# Patient Record
Sex: FEMALE | Race: WHITE | NOT HISPANIC OR LATINO | Employment: FULL TIME | URBAN - METROPOLITAN AREA
[De-identification: names, ages, dates, MRNs, and addresses within clinical notes are randomized per-mention and may not be internally consistent; named-entity substitution may affect disease eponyms.]

---

## 2017-01-24 RX ORDER — CLORAZEPATE DIPOTASSIUM 7.5 MG/1
TABLET ORAL
Qty: 60 TABLET | Refills: 3 | Status: SHIPPED | OUTPATIENT
Start: 2017-01-24 | End: 2018-07-02

## 2017-01-31 RX ORDER — FLUTICASONE PROPIONATE 50 MCG
2 SPRAY, SUSPENSION (ML) NASAL DAILY PRN
Qty: 1 BOTTLE | Refills: 1 | Status: SHIPPED | OUTPATIENT
Start: 2017-01-31 | End: 2018-02-09 | Stop reason: SDUPTHER

## 2017-01-31 NOTE — TELEPHONE ENCOUNTER
----- Message from Jessica Larkin sent at 1/31/2017 10:59 AM CST -----  Contact: self  Patient is requesting a Rx for flonase    Please send to    [id number] - [place] - [id number]  [id number]  [place] [address]  Phone: [phone] Fax: [phone]

## 2017-02-10 ENCOUNTER — OFFICE VISIT (OUTPATIENT)
Dept: DERMATOLOGY | Facility: CLINIC | Age: 52
End: 2017-02-10
Payer: COMMERCIAL

## 2017-02-10 DIAGNOSIS — L73.8 SEBACEOUS HYPERPLASIA: ICD-10-CM

## 2017-02-10 DIAGNOSIS — L21.9 ACUTE SEBORRHEIC DERMATITIS: Primary | ICD-10-CM

## 2017-02-10 PROCEDURE — 99201 PR OFFICE/OUTPT VISIT,NEW,LEVL I: CPT | Mod: S$GLB,,, | Performed by: DERMATOLOGY

## 2017-02-10 PROCEDURE — 99999 PR PBB SHADOW E&M-EST. PATIENT-LVL II: CPT | Mod: PBBFAC,,, | Performed by: DERMATOLOGY

## 2017-02-10 RX ORDER — KETOCONAZOLE 20 MG/ML
SHAMPOO, SUSPENSION TOPICAL
Qty: 120 ML | Refills: 5 | Status: SHIPPED | OUTPATIENT
Start: 2017-02-10 | End: 2017-07-19

## 2017-02-10 RX ORDER — FLUOCINONIDE TOPICAL SOLUTION USP, 0.05% 0.5 MG/ML
SOLUTION TOPICAL
Qty: 60 ML | Refills: 3 | Status: SHIPPED | OUTPATIENT
Start: 2017-02-10 | End: 2017-07-19

## 2017-02-10 RX ORDER — ESTERIFIED ESTROGEN AND METHYLTESTOSTERONE .625; 1.25 MG/1; MG/1
TABLET ORAL
Status: ON HOLD | COMMUNITY
Start: 2017-01-25 | End: 2018-11-21 | Stop reason: HOSPADM

## 2017-02-10 RX ORDER — TRETINOIN 0.5 MG/G
CREAM TOPICAL
COMMUNITY
Start: 2016-11-08

## 2017-02-10 NOTE — PROGRESS NOTES
Subjective:       Patient ID:  Lakia Wright is a 52 y.o. female who presents for   Chief Complaint   Patient presents with    Acne     Currently using a retin-a    Hair Loss     x several years, no symptoms, no treatment      Acne  - Initial  Affected locations: face  Duration: several years.  Signs / symptoms: asymptomatic  Aggravated by: nothing  Treatments tried: Retin-A     Hair Loss  - Initial  Affected locations: scalp  Duration: Several years.  Signs / symptoms: asymptomatic  Aggravated by: nothing  Relieving factors/Treatments tried: nothing    Denies itching, flaking, or rashes in the scalp.  No fam hx of hair loss.  States hair thinning has been gradual over the yrs. Recent TSH, CBC WNL. Normal vit D levels in 2014 (and pt doesn't want to re-check this today).    Pt is interested in discussing cosmetic treatments for the face. Has done IPL in the past for her neck and chest and was pleased with results.  Uses retin-A 0.05%. Has had filler and botox in the past. Would like to know a good daily regimen. Interested in possibly doing more to help with sun damage and wrinkles.    Review of Systems   Constitutional: Negative for fever, chills, weight loss, weight gain, fatigue, night sweats and malaise.   Skin: Positive for daily sunscreen use and activity-related sunscreen use. Negative for recent sunburn.   Hematologic/Lymphatic: Does not bruise/bleed easily.        Objective:    Physical Exam   Constitutional: She appears well-developed and well-nourished. No distress.   Neurological: She is alert and oriented to person, place, and time. She is not disoriented.   Psychiatric: She has a normal mood and affect.   Skin:   Areas Examined (abnormalities noted in diagram):   Scalp / Hair Palpated and Inspected  Head / Face Inspection Performed  Neck Inspection Performed              Diagram Legend     Erythematous scaling macule/papule c/w actinic keratosis       Vascular papule c/w angioma      Pigmented  verrucoid papule/plaque c/w seborrheic keratosis      Yellow umbilicated papule c/w sebaceous hyperplasia      Irregularly shaped tan macule c/w lentigo     1-2 mm smooth white papules consistent with Milia      Movable subcutaneous cyst with punctum c/w epidermal inclusion cyst      Subcutaneous movable cyst c/w pilar cyst      Firm pink to brown papule c/w dermatofibroma      Pedunculated fleshy papule(s) c/w skin tag(s)      Evenly pigmented macule c/w junctional nevus     Mildly variegated pigmented, slightly irregular-bordered macule c/w mildly atypical nevus      Flesh colored to evenly pigmented papule c/w intradermal nevus       Pink pearly papule/plaque c/w basal cell carcinoma      Erythematous hyperkeratotic cursted plaque c/w SCC      Surgical scar with no sign of skin cancer recurrence      Open and closed comedones      Inflammatory papules and pustules      Verrucoid papule consistent consistent with wart     Erythematous eczematous patches and plaques     Dystrophic onycholytic nail with subungual debris c/w onychomycosis     Umbilicated papule    Erythematous-base heme-crusted tan verrucoid plaque consistent with inflamed seborrheic keratosis     Erythematous Silvery Scaling Plaque c/w Psoriasis     See annotation      Assessment / Plan:        Acute seborrheic dermatitis  -     fluocinonide (LIDEX) 0.05 % external solution; AAA scalp qd - bid prn scaling  Dispense: 60 mL; Refill: 3  -     ketoconazole (NIZORAL) 2 % shampoo; Wash hair with medicated shampoo at least 2x/week - let sit on scalp at least 5 minutes prior to rinsing  Dispense: 120 mL; Refill: 5  Rec: biotin 5mg daily    Sebaceous hyperplasia  This is a benign overgrowth of oil glands. Reassurance given. No treatment required.  Treatment of benign, asymptomatic lesions may be considered cosmetic.  Continue retin-A.      Discussed daily use of sunscreen, antioxidant cream, and nighttime retinoid.  Discussed indications for various laser  treatments and that she would have to schedule an appt with Dr. Nice for this if interested.  Discussed ViPeel as well as botox and fillers and the roles for each of these.    rtc prn

## 2017-07-10 ENCOUNTER — TELEPHONE (OUTPATIENT)
Dept: FAMILY MEDICINE | Facility: CLINIC | Age: 52
End: 2017-07-10

## 2017-07-10 NOTE — TELEPHONE ENCOUNTER
"Triaged patient from 7/17/17 RODO Valerio appointment for occassional chest pain. Per patient, she experiences "rib chest tightness sometimes during sleep sometimes during daytime for over a year". When asked denies SOB, dizziness, inability to catch breath, pain in described area. Has not talked with PCP/staff about this issue. Scheduled 7/13/17  for assessment with caution if develops any of above symptoms to present to ER. She agrees with this plan. Her LOV with PCP 7/2016. Scheduled Physical/wellness appointment with her care team 7/19/17.  "

## 2017-07-13 ENCOUNTER — OFFICE VISIT (OUTPATIENT)
Dept: FAMILY MEDICINE | Facility: CLINIC | Age: 52
End: 2017-07-13
Payer: COMMERCIAL

## 2017-07-13 ENCOUNTER — DOCUMENTATION ONLY (OUTPATIENT)
Dept: FAMILY MEDICINE | Facility: CLINIC | Age: 52
End: 2017-07-13

## 2017-07-13 ENCOUNTER — HOSPITAL ENCOUNTER (OUTPATIENT)
Dept: RADIOLOGY | Facility: CLINIC | Age: 52
Discharge: HOME | End: 2017-07-13
Attending: FAMILY MEDICINE
Payer: COMMERCIAL

## 2017-07-13 VITALS
TEMPERATURE: 98 F | BODY MASS INDEX: 23.6 KG/M2 | HEART RATE: 69 BPM | HEIGHT: 61 IN | WEIGHT: 125 LBS | SYSTOLIC BLOOD PRESSURE: 102 MMHG | DIASTOLIC BLOOD PRESSURE: 62 MMHG

## 2017-07-13 DIAGNOSIS — R07.89 CHEST PRESSURE: ICD-10-CM

## 2017-07-13 DIAGNOSIS — R07.89 CHEST PRESSURE: Primary | ICD-10-CM

## 2017-07-13 PROCEDURE — 99213 OFFICE O/P EST LOW 20 MIN: CPT | Mod: S$GLB,,, | Performed by: PHYSICIAN ASSISTANT

## 2017-07-13 PROCEDURE — 71020 XR CHEST PA AND LATERAL: CPT | Mod: 26,,, | Performed by: RADIOLOGY

## 2017-07-13 PROCEDURE — 99999 PR PBB SHADOW E&M-EST. PATIENT-LVL IV: CPT | Mod: PBBFAC,,, | Performed by: PHYSICIAN ASSISTANT

## 2017-07-13 PROCEDURE — 71020 XR CHEST PA AND LATERAL: CPT | Mod: TC,PO

## 2017-07-13 PROCEDURE — 93005 ELECTROCARDIOGRAM TRACING: CPT | Mod: S$GLB,,, | Performed by: FAMILY MEDICINE

## 2017-07-13 PROCEDURE — 93010 ELECTROCARDIOGRAM REPORT: CPT | Mod: S$GLB,,, | Performed by: INTERNAL MEDICINE

## 2017-07-13 RX ORDER — MELOXICAM 15 MG/1
7.5 TABLET ORAL DAILY
COMMUNITY
End: 2018-07-02

## 2017-07-13 NOTE — PROGRESS NOTES
Pre-Visit Chart Review  For Appointment Scheduled on (00119)    Health Maintenance Due   Topic Date Due    TETANUS VACCINE  02/01/1982    Pap Smear with HPV Cotest  02/01/1985    Colonoscopy  02/01/2014

## 2017-07-13 NOTE — PROGRESS NOTES
Subjective:       Patient ID: Lakia Wright is a 52 y.o. female.    Chief Complaint: Chest Pain (occ)    Patient present for evaluation of sensation of chest pressure.  She states that symptoms began about 1 year ago.  She states that when the symptoms first began she was under a lot of stress with her sick father.  She states that initially symptoms were only at night during sleep.  She states that symptoms occurred about 3 times in the past year.  Lately symptoms have occurred during the daytime; once while standing talking with a friend.  She exercising regularly and does not have chest pain, shortness of breath, nausea, vomiting or diaphoresis.  She states that the pressure starts mid sternal and radiates into the anterior neck.  Pressure has been anywhere from 1-10 minutes in duration.  When the pressure occurs she feels the need to take a deep breath.        Review of Systems   Constitutional: Negative for appetite change, fatigue and unexpected weight change.   Respiratory: Positive for chest tightness. Negative for apnea, cough, choking, shortness of breath, wheezing and stridor.    Cardiovascular: Negative for chest pain, palpitations and leg swelling.   Gastrointestinal: Negative for abdominal pain.        Denies heartburn      Musculoskeletal: Negative for back pain and myalgias.   Psychiatric/Behavioral: Positive for sleep disturbance. Negative for dysphoric mood. The patient is not nervous/anxious.        Objective:      Physical Exam   Constitutional: She appears well-developed and well-nourished. She is cooperative. No distress.   HENT:   Head: Normocephalic and atraumatic.   Eyes: Conjunctivae and EOM are normal. Pupils are equal, round, and reactive to light. No scleral icterus.   Cardiovascular: Normal rate, regular rhythm and normal heart sounds.    Pulmonary/Chest: Effort normal and breath sounds normal. She exhibits no tenderness.   Abdominal: Soft. Normal appearance and bowel sounds are  normal. She exhibits no distension. There is no tenderness.   Musculoskeletal:        Right lower leg: She exhibits no edema.        Left lower leg: She exhibits no edema.   Neurological: She is alert.   Skin: Skin is warm and dry.   Psychiatric: She has a normal mood and affect. Her speech is normal. Judgment normal. Cognition and memory are normal.   Vitals reviewed.      Assessment:       1. Chest pressure        Plan:       Lakia was seen today for chest pain.    Diagnoses and all orders for this visit:    Chest pressure  -     Exercise stress echo; Future  -     X-Ray Chest PA And Lateral; Future    Further recommendations will be made based on results     Discussed possibly anxiety as etiology however due to age, hormone therapy , family history will complete cardiac work up.   Seek ER for worsening symptoms CP, N/V, diaphoresis, shortness of breath

## 2017-07-17 ENCOUNTER — DOCUMENTATION ONLY (OUTPATIENT)
Dept: FAMILY MEDICINE | Facility: CLINIC | Age: 52
End: 2017-07-17

## 2017-07-17 NOTE — PROGRESS NOTES
Pre-Visit Chart Review  For Appointment Scheduled on 07/19/17    Health Maintenance Due   Topic Date Due    TETANUS VACCINE  02/01/1982    Pap Smear with HPV Cotest  02/01/1985    Colonoscopy  02/01/2014

## 2017-07-19 ENCOUNTER — CLINICAL SUPPORT (OUTPATIENT)
Dept: CARDIOLOGY | Facility: CLINIC | Age: 52
End: 2017-07-19
Payer: COMMERCIAL

## 2017-07-19 ENCOUNTER — TELEPHONE (OUTPATIENT)
Dept: FAMILY MEDICINE | Facility: CLINIC | Age: 52
End: 2017-07-19

## 2017-07-19 ENCOUNTER — OFFICE VISIT (OUTPATIENT)
Dept: FAMILY MEDICINE | Facility: CLINIC | Age: 52
End: 2017-07-19
Payer: COMMERCIAL

## 2017-07-19 VITALS
WEIGHT: 124.56 LBS | SYSTOLIC BLOOD PRESSURE: 100 MMHG | DIASTOLIC BLOOD PRESSURE: 50 MMHG | BODY MASS INDEX: 23.52 KG/M2 | HEIGHT: 61 IN | TEMPERATURE: 98 F | HEART RATE: 67 BPM

## 2017-07-19 DIAGNOSIS — R07.89 CHEST PRESSURE: ICD-10-CM

## 2017-07-19 DIAGNOSIS — Z13.220 SCREENING CHOLESTEROL LEVEL: ICD-10-CM

## 2017-07-19 DIAGNOSIS — Z00.00 ROUTINE MEDICAL EXAM: Primary | ICD-10-CM

## 2017-07-19 LAB
DIASTOLIC DYSFUNCTION: NO
RETIRED EF AND QEF - SEE NOTES: 58 (ref 55–65)

## 2017-07-19 PROCEDURE — 93351 STRESS TTE COMPLETE: CPT | Mod: S$GLB,,, | Performed by: INTERNAL MEDICINE

## 2017-07-19 PROCEDURE — 99999 PR PBB SHADOW E&M-EST. PATIENT-LVL III: CPT | Mod: PBBFAC,,, | Performed by: PHYSICIAN ASSISTANT

## 2017-07-19 PROCEDURE — 99396 PREV VISIT EST AGE 40-64: CPT | Mod: S$GLB,,, | Performed by: PHYSICIAN ASSISTANT

## 2017-07-19 PROCEDURE — 93321 DOPPLER ECHO F-UP/LMTD STD: CPT | Mod: S$GLB,,, | Performed by: INTERNAL MEDICINE

## 2017-07-19 RX ORDER — ESTRADIOL 0.1 MG/G
CREAM VAGINAL DAILY
Status: ON HOLD | COMMUNITY
End: 2018-11-21 | Stop reason: HOSPADM

## 2017-07-19 NOTE — PROGRESS NOTES
Subjective:       Patient ID: Lakia Wright is a 52 y.o. female.    Chief Complaint: Annual Exam    HPI   Patient is a 52 year old  female presenting to the clinic for annual wellness exam. She was recently seen by RODO Valerio last week for concerns of chest pressures. She feels as though in the past these may have been related anxiety, but she does not feel anxious currently. She does deny having any of these symptoms occur since her last visit with RODO Valerio.  Review of Systems   Constitutional: Negative for activity change, appetite change, chills, diaphoresis, fatigue and fever.   HENT: Negative for congestion, postnasal drip and rhinorrhea.    Respiratory: Negative.  Negative for cough, shortness of breath and wheezing.    Cardiovascular: Negative.  Negative for chest pain.        Chest pressure   Gastrointestinal: Negative for abdominal pain, blood in stool, constipation, diarrhea, nausea and vomiting.   Genitourinary: Negative for dysuria, frequency, hematuria and urgency.   Musculoskeletal: Negative.    Skin: Negative.  Negative for color change and rash.   Neurological: Negative for dizziness and syncope.   Psychiatric/Behavioral: Negative for agitation, behavioral problems and confusion.       Objective:      Physical Exam   Constitutional: Vital signs are normal. She appears well-developed and well-nourished. No distress.   HENT:   Head: Normocephalic and atraumatic.   Right Ear: Hearing, tympanic membrane, external ear and ear canal normal.   Left Ear: Hearing, tympanic membrane, external ear and ear canal normal.   Nose: Nose normal.   Mouth/Throat: Uvula is midline and oropharynx is clear and moist.   Cardiovascular: Normal rate, regular rhythm, S1 normal, S2 normal and normal heart sounds.  Exam reveals no gallop.    No murmur heard.  Pulses:       Radial pulses are 2+ on the right side, and 2+ on the left side.   <2sec cap refill fingers bilat     Pulmonary/Chest:  Effort normal and breath sounds normal. No respiratory distress. She has no decreased breath sounds. She has no wheezes. She has no rhonchi.   Abdominal: Soft. Normal appearance and bowel sounds are normal. There is no tenderness. There is no rigidity and no guarding.   Skin: Skin is warm and dry. She is not diaphoretic.   Appropriate skin turgor   Psychiatric: She has a normal mood and affect. Her speech is normal and behavior is normal. Judgment and thought content normal. Cognition and memory are normal.       Assessment:       1. Routine medical exam    2. Chest pressure    3. Screening cholesterol level        Plan:   Lakia was seen today for annual exam.    Diagnoses and all orders for this visit:    Routine medical exam  -     Vitamin D; Future    Chest pressure  -     CBC auto differential; Future  -     Comprehensive metabolic panel; Future  -     TSH; Future    Screening cholesterol level  -     Lipid panel; Future

## 2017-07-19 NOTE — TELEPHONE ENCOUNTER
Spoke to patient notified she can come in early but will have to wait if other patient's are here, states understanding

## 2017-07-19 NOTE — TELEPHONE ENCOUNTER
----- Message from Denny Jeanne sent at 7/19/2017  1:01 PM CDT -----  Contact: Patient  Patient is now in [place] doing a test and it is scheduled for 2:00pm and has an appointment today for her Well Check at 5:40pm.  She would like to come in earlier after her test is done.  Can you fit her in at an earlier time.  Please call her back [phone].  Thank you

## 2017-07-26 ENCOUNTER — TELEPHONE (OUTPATIENT)
Dept: FAMILY MEDICINE | Facility: CLINIC | Age: 52
End: 2017-07-26

## 2017-07-26 NOTE — TELEPHONE ENCOUNTER
Please inform patient that the alternative to the stool cards is a test called cologuard. I have the form filled out for this to be send to her house for testing, but she will need to come to clinic and sign form.     It looks like under like [employer] will cover this, but she may want to call insurance compant to be sure.

## 2017-07-26 NOTE — TELEPHONE ENCOUNTER
Returned Patient call. Patient States at this time she declines the alternative to the stool cards.       ----- Message from Mabel Salguero sent at 7/26/2017 11:25 AM CDT -----  Patient states she missed a call from office please call [phone] (home)

## 2017-08-24 DIAGNOSIS — Z12.11 COLON CANCER SCREENING: ICD-10-CM

## 2018-02-09 RX ORDER — FLUTICASONE PROPIONATE 50 MCG
SPRAY, SUSPENSION (ML) NASAL
Qty: 16 G | Refills: 11 | Status: SHIPPED | OUTPATIENT
Start: 2018-02-09 | End: 2019-05-26 | Stop reason: SDUPTHER

## 2018-07-02 ENCOUNTER — DOCUMENTATION ONLY (OUTPATIENT)
Dept: FAMILY MEDICINE | Facility: CLINIC | Age: 53
End: 2018-07-02

## 2018-07-02 ENCOUNTER — OFFICE VISIT (OUTPATIENT)
Dept: FAMILY MEDICINE | Facility: CLINIC | Age: 53
End: 2018-07-02
Payer: COMMERCIAL

## 2018-07-02 VITALS
SYSTOLIC BLOOD PRESSURE: 94 MMHG | HEART RATE: 62 BPM | DIASTOLIC BLOOD PRESSURE: 59 MMHG | HEIGHT: 61 IN | WEIGHT: 117.94 LBS | TEMPERATURE: 98 F | BODY MASS INDEX: 22.27 KG/M2

## 2018-07-02 DIAGNOSIS — Z12.39 SCREENING FOR BREAST CANCER: ICD-10-CM

## 2018-07-02 DIAGNOSIS — Z13.220 SCREENING CHOLESTEROL LEVEL: ICD-10-CM

## 2018-07-02 DIAGNOSIS — Z00.00 ANNUAL PHYSICAL EXAM: Primary | ICD-10-CM

## 2018-07-02 DIAGNOSIS — Z12.11 SCREENING FOR COLON CANCER: ICD-10-CM

## 2018-07-02 PROCEDURE — 99396 PREV VISIT EST AGE 40-64: CPT | Mod: S$GLB,,, | Performed by: PHYSICIAN ASSISTANT

## 2018-07-02 PROCEDURE — 99999 PR PBB SHADOW E&M-EST. PATIENT-LVL IV: CPT | Mod: PBBFAC,,, | Performed by: PHYSICIAN ASSISTANT

## 2018-07-02 RX ORDER — MEDROXYPROGESTERONE ACETATE 2.5 MG/1
2.5 TABLET ORAL DAILY
Status: ON HOLD | COMMUNITY
End: 2018-11-21

## 2018-07-02 NOTE — PROGRESS NOTES
Subjective:       Patient ID: Lakia Wright is a 53 y.o. female.    Chief Complaint: Annual Exam    HPI   Patient is a 53 tear old  female presenting to the clinic for annual physical. She is doing well at this time. She never got her labs done last year for her annual. She will be due for her mammogram 7/5/18. She has never had a colon cancer screening. She was supposed to come sign the form to get cologaurd last year and never did. She opts to sign the form while in clinic, but still would like to think about getting a colonoscopy done in [place], [place].  Review of Systems   Constitutional: Negative for activity change, appetite change, chills, diaphoresis, fatigue and fever.   HENT: Negative for congestion, postnasal drip and rhinorrhea.    Respiratory: Negative.  Negative for cough, shortness of breath and wheezing.    Cardiovascular: Negative.  Negative for chest pain.   Gastrointestinal: Negative for abdominal pain, blood in stool, constipation, diarrhea, nausea and vomiting.   Genitourinary: Negative for dysuria, frequency, hematuria and urgency.   Musculoskeletal: Negative.    Skin: Negative.  Negative for color change and rash.   Neurological: Negative for dizziness and syncope.   Psychiatric/Behavioral: Negative for agitation, behavioral problems and confusion.       Objective:      Physical Exam   Constitutional: Vital signs are normal. She appears well-developed and well-nourished. No distress.   HENT:   Head: Normocephalic and atraumatic.   Right Ear: Hearing, tympanic membrane, external ear and ear canal normal.   Left Ear: Hearing, tympanic membrane, external ear and ear canal normal.   Nose: Nose normal.   Mouth/Throat: Uvula is midline, oropharynx is clear and moist and mucous membranes are normal.   Cardiovascular: Normal rate, regular rhythm, S1 normal, S2 normal and normal heart sounds.  Exam reveals no gallop.    No murmur heard.  Pulses:       Radial pulses are 2+ on the right side,  and 2+ on the left side.        Pulmonary/Chest: Effort normal and breath sounds normal. No respiratory distress. She has no wheezes. She has no rhonchi.   Abdominal: Soft. Normal appearance and bowel sounds are normal. There is no tenderness. There is no rigidity, no guarding, no tenderness at McBurney's point and negative Snyder's sign.   Skin: Skin is warm and dry. She is not diaphoretic.   Appropriate skin turgor   Psychiatric: She has a normal mood and affect. Her speech is normal and behavior is normal. Judgment and thought content normal. Cognition and memory are normal.       Assessment:       1. Annual physical exam    2. Screening cholesterol level    3. Screening for breast cancer    4. Screening for colon cancer        Plan:       Lakia was seen today for annual exam.    Diagnoses and all orders for this visit:    Annual physical exam  -     CBC auto differential; Future  -     Comprehensive metabolic panel; Future  -     TSH; Future  -     Vitamin D; Future    Screening cholesterol level  -     Lipid panel; Future    Screening for breast cancer  -     Mammo Digital Screening Bilateral With CAD; Future    Screening for colon cancer  We discussed extensively about pros/cons of cologaurd vs. Colonoscopy. She opts to sign the cologuard paperwork in case she decides to move forward with this.   -     Ambulatory referral to Gastroenterology

## 2018-07-02 NOTE — PROGRESS NOTES
Pre-Visit Chart Review  For Appointment Scheduled on 07/02/18    Health Maintenance Due   Topic Date Due    Colonoscopy  02/01/2014

## 2018-08-08 ENCOUNTER — PATIENT OUTREACH (OUTPATIENT)
Dept: ADMINISTRATIVE | Facility: HOSPITAL | Age: 53
End: 2018-08-08

## 2018-08-14 ENCOUNTER — TELEPHONE (OUTPATIENT)
Dept: FAMILY MEDICINE | Facility: CLINIC | Age: 53
End: 2018-08-14

## 2018-08-14 NOTE — TELEPHONE ENCOUNTER
Spoke with patient and she wanted to know if we had alternative home tests for a colonoscopy. Stated we had the Fitkit, but she stated that Annie had mentioned about a Cologuard. Patient decided she wanted to be scheduled for a colonoscopy. Patient stated she would call [facility] to schedule an appointment.

## 2018-08-14 NOTE — TELEPHONE ENCOUNTER
----- Message from Anika Fishman sent at 8/14/2018 10:37 AM CDT -----  Contact: self  Needs to discuss the other test that's available other than a colonoscopy. States that it is covered by insurance. Please call back at [phone] (home)

## 2018-11-02 ENCOUNTER — OFFICE VISIT (OUTPATIENT)
Dept: GASTROENTEROLOGY | Facility: CLINIC | Age: 53
End: 2018-11-02
Payer: COMMERCIAL

## 2018-11-02 VITALS
DIASTOLIC BLOOD PRESSURE: 54 MMHG | SYSTOLIC BLOOD PRESSURE: 98 MMHG | HEIGHT: 61 IN | WEIGHT: 122.81 LBS | BODY MASS INDEX: 23.19 KG/M2

## 2018-11-02 DIAGNOSIS — Z12.11 COLON CANCER SCREENING: Primary | ICD-10-CM

## 2018-11-02 DIAGNOSIS — Z83.719 FAMILY HISTORY OF COLONIC POLYPS: ICD-10-CM

## 2018-11-02 PROCEDURE — 99999 PR PBB SHADOW E&M-EST. PATIENT-LVL III: CPT | Mod: PBBFAC,,, | Performed by: NURSE PRACTITIONER

## 2018-11-02 PROCEDURE — 99499 UNLISTED E&M SERVICE: CPT | Mod: S$GLB,,, | Performed by: NURSE PRACTITIONER

## 2018-11-02 NOTE — LETTER
November 2, 2018      [facility]  [address] [facility] [place] [address]           [facility]  [address] [place] [address]  [place] [address]  Phone: [phone]          Patient: Lakia Wright   MR Number: [id number]   YOB: 1965   Date of Visit: 11/2/2018       Dear Nancy Fregoso:    Thank you for referring Lakia Wright to me for evaluation. Attached you will find relevant portions of my assessment and plan of care.    If you have questions, please do not hesitate to call me. I look forward to following Lakia Wright along with you.    Sincerely,    Maryann Muhammad, [facility]    Enclosure  CC:  No Recipients    If you would like to receive this communication electronically, please contact [email or web address] or [phone] to request more information on [employer] access.    For providers and/or their staff who would like to refer a patient to [facility], please contact us through our one-stop-shop provider referral line, [facility], at [phone].    If you feel you have received this communication in error or would no longer like to receive these types of communications, please e-mail [email or web address]

## 2018-11-02 NOTE — H&P (VIEW-ONLY)
Subjective:       Patient ID: Lakia Wright is a 53 y.o. female.    Chief Complaint: Colon Cancer Screening    HPI  Presents today to discuss colon cancer screening options.  Reports family history of colon polyps in her mother.  Denies change in bowel habits, blood with bowel movements or abdominal pain.  She has never had colonoscopy.      Review of Systems   Constitutional: Negative for activity change, appetite change, fatigue, fever and unexpected weight change.   Respiratory: Negative for shortness of breath.    Cardiovascular: Negative for chest pain.   Gastrointestinal: Negative for abdominal pain, blood in stool, constipation, diarrhea, nausea and vomiting.   Skin: Negative.    Psychiatric/Behavioral: Negative.        Objective:      Physical Exam   Constitutional: She is oriented to person, place, and time. She appears well-developed and well-nourished. No distress.   Eyes: No scleral icterus.   Cardiovascular: Normal rate.   Pulmonary/Chest: Effort normal. No respiratory distress.   Abdominal: Soft.   Neurological: She is alert and oriented to person, place, and time.   Skin: Skin is warm and dry. She is not diaphoretic.   Psychiatric: She has a normal mood and affect. Her behavior is normal. Judgment and thought content normal.   Vitals reviewed.      Assessment:       1. Colon cancer screening        Plan:         Lakia was seen today for colon cancer screening.    Diagnoses and all orders for this visit:    Colon cancer screening         I have explained the planned procedures to the patient.The risks, benefits and alternatives of the procedure were also explained in detail. Patient verbalized understanding, all questions were answered. The patient agrees to proceed as planned    Discussed FIT testing, cologuard, colonoscopy.  She has family member with colon polyps and not considered average risk for screening.  Therefore, colonoscopy recommended though risks and benefits of each have been  discussed.

## 2018-11-02 NOTE — PATIENT INSTRUCTIONS
CLENPIQ Instructions    You are scheduled for a colonoscopy with Dr. Laguna on 11/28/18 at [facility] located at [address] [place] [address].  Check in at the admit desk, first floor of the hospital (which is the building on the left).     You will receive a call 2-3 days before your colonoscopy to tell you the time to arrive.  If you have not received a call by the day before your procedure, call the Endoscopy Lab at [phone].    To ensure that your test is accurate and complete, you MUST follow these instructions listed below.  If you have any questions, please call our office at [phone].  Plan on being at the hospital for your procedure for 3-4 hours.    1.  Follow a CLEAR LIQUID DIET for the entire day before your scheduled colonoscopy.  This means no solid food the entire day starting when you wake.  You may have as much of the clear liquids as you want throughout the day.   CLEAR LIQUID DIET:   - Avoid Red, Orange, Purple, and/or Blue food coloring   - NO DAIRY   - You can have:  Coffee with sugar (no creamer), tea, water, soda, apple or white grape juice, chicken or beef broth/bouillon (no meat, noodles, or veggies), green/yellow popsicles, green/yellow Jell-O, lemonade.    2.  AT 5 pm the evening before your colonoscopy, OPEN ONE (1) BOTTLE OF CLENPIQ AND DRINK THE ENTIRE BOTTLE.  DRINK FIVE (5) 8-OUNCE GLASSES OF WATER (40 OUNCES TOTAL) OVER THE NEXT FIVE (5) HOURS.     3.  The endoscopy department will call you 2 days before your colonoscopy to tell you the exact time to arrive, AND to tell you the exact time to drink the 2nd portion of your prep (which will be FIVE HOURS BEFORE YOUR ARRIVAL TIME).  At this time given to you, OPEN THE OTHER ONE (1) BOTTLE OF CLENPIQ AND DRINK THE ENTIRE BOTTLE.  DRINK THREE (3) 8-OUNCE GLASSES OF WATER (24 OUNCES TOTAL) OVER THE NEXT THREE (3) HOURS. Once this is complete, you can not have anything else by mouth!    4.  You must have someone with you to  DRIVE YOU HOME since you will be receiving IV sedation for the colonoscopy.    5.  It is ok to take your heart, blood pressure, and seizure medications in the morning of your test with a SIP of water.  Hold other medications until after your procedure.  Do NOT have anything else to eat or drink the morning of your colonoscopy.  It is ok to brush your teeth.    6.  If you are on blood thinners THAT YOU HAVE BEEN INSTRUCTED TO HOLD BY YOUR DOCTOR FOR THIS PROCEDURE, then do NOT take this the morning of your colonoscopy.  Do NOT stop these medications on your own, they must be approved to be held by your doctor.  Your colonoscopy can NOT be done if you are on these medications.  Examples of blood thinners include: Coumadin, Aggrenox, Plavix, Pradaxa, Reapro, Pletal, Xarelto, Ticagrelor, Brilinta, Eliquis, and high dose aspirin (325 mg).  You do not have to stop baby aspirin 81 mg.    7.  IF YOU ARE DIABETIC:  NO INSULIN OR ORAL MEDICATIONS THE MORNING OF THE COLONOSCOPY.  TAKE ONLY HALF THE DOSE OF YOUR INSULIN THE DAY BEFORE THE COLONOSCOPY.  DO NOT TAKE ANY ORAL DIABETIC MEDICATIONS THE DAY BEFORE THE COLONOSCOPY.  IF YOU ARE AN INSULIN DEPENDENT DIABETIC WITH UNSTABLE BLOOD SUGARDS, NOTIFY YOUR PRIMARY CARE PHYSICIAN FOR INSTRUCTIONS.

## 2018-11-05 ENCOUNTER — TELEPHONE (OUTPATIENT)
Dept: GASTROENTEROLOGY | Facility: CLINIC | Age: 53
End: 2018-11-05

## 2018-11-07 ENCOUNTER — TELEPHONE (OUTPATIENT)
Dept: GASTROENTEROLOGY | Facility: CLINIC | Age: 53
End: 2018-11-07

## 2018-11-07 NOTE — TELEPHONE ENCOUNTER
----- Message from Austin Dunbar MA sent at 11/7/2018 10:05 AM CST -----  Contact: [phone]/ self       ----- Message -----  From: Evette Benito  Sent: 11/7/2018   7:47 AM  To: [facility] Staff    Pt would like to reschedule her procedure date . Please advise

## 2018-11-07 NOTE — TELEPHONE ENCOUNTER
Spoke with pt and she would like to reschedule the Colonoscopy due to her being off the week of 11/19. Patient has been scheduled with Dr. Laguna on 11/21/18. Informed pt that there was a change in her prep due to her insurance not covering it. Patient is now using Prepopik. Verbal Understanding.

## 2018-11-19 ENCOUNTER — TELEPHONE (OUTPATIENT)
Dept: ENDOSCOPY | Facility: HOSPITAL | Age: 53
End: 2018-11-19

## 2018-11-19 NOTE — TELEPHONE ENCOUNTER
Spoke with patient about arrival time @.915.     Prep instructions reviewed: the day before the procedure, follow a clear liquid diet all day, then start the first 1/2 of prep at 5pm and take 2nd 1/2 of prep @ 0315   .  Pt must be completely NPO when prep completed @   0315       .    Medications: Do not take Insulin or oral diabetic medications the day of the procedure.  Take as prescribed: heart, seizure and blood pressure medication in the morning with a sip of water (less than an ounce).  Take any breathing medications and bring inhalers to hospital with you Leave all valuables and jewelry at home.     Wear comfortable clothes to procedure to change into hospital gown You cannot drive for 24 hours after your procedure because you will receive sedation for your procedure to make you comfortable.  A ride must be provided at discharge.

## 2018-11-21 ENCOUNTER — ANESTHESIA (OUTPATIENT)
Dept: ENDOSCOPY | Facility: HOSPITAL | Age: 53
End: 2018-11-21
Payer: COMMERCIAL

## 2018-11-21 ENCOUNTER — ANESTHESIA EVENT (OUTPATIENT)
Dept: ENDOSCOPY | Facility: HOSPITAL | Age: 53
End: 2018-11-21
Payer: COMMERCIAL

## 2018-11-21 ENCOUNTER — HOSPITAL ENCOUNTER (OUTPATIENT)
Facility: HOSPITAL | Age: 53
Discharge: HOME | End: 2018-11-21
Attending: INTERNAL MEDICINE | Admitting: INTERNAL MEDICINE
Payer: COMMERCIAL

## 2018-11-21 VITALS
BODY MASS INDEX: 22.28 KG/M2 | WEIGHT: 118 LBS | TEMPERATURE: 98 F | OXYGEN SATURATION: 99 % | DIASTOLIC BLOOD PRESSURE: 65 MMHG | SYSTOLIC BLOOD PRESSURE: 107 MMHG | HEART RATE: 71 BPM | RESPIRATION RATE: 16 BRPM | HEIGHT: 61 IN

## 2018-11-21 DIAGNOSIS — Z12.11 SCREEN FOR COLON CANCER: Primary | ICD-10-CM

## 2018-11-21 PROCEDURE — 37000008 HC ANESTHESIA 1ST 15 MINUTES: Performed by: INTERNAL MEDICINE

## 2018-11-21 PROCEDURE — 45380 COLONOSCOPY AND BIOPSY: CPT | Mod: 33,,, | Performed by: INTERNAL MEDICINE

## 2018-11-21 PROCEDURE — 25000003 PHARM REV CODE 250: Performed by: INTERNAL MEDICINE

## 2018-11-21 PROCEDURE — 45380 COLONOSCOPY AND BIOPSY: CPT | Performed by: INTERNAL MEDICINE

## 2018-11-21 PROCEDURE — 63600175 PHARM REV CODE 636 W HCPCS: Performed by: NURSE ANESTHETIST, CERTIFIED REGISTERED

## 2018-11-21 PROCEDURE — 27201012 HC FORCEPS, HOT/COLD, DISP: Performed by: INTERNAL MEDICINE

## 2018-11-21 PROCEDURE — 88305 TISSUE EXAM BY PATHOLOGIST: CPT | Mod: 26,,, | Performed by: PATHOLOGY

## 2018-11-21 PROCEDURE — 37000009 HC ANESTHESIA EA ADD 15 MINS: Performed by: INTERNAL MEDICINE

## 2018-11-21 PROCEDURE — 88305 TISSUE EXAM BY PATHOLOGIST: CPT | Performed by: PATHOLOGY

## 2018-11-21 RX ORDER — SODIUM CHLORIDE 9 MG/ML
INJECTION, SOLUTION INTRAVENOUS CONTINUOUS
Status: DISCONTINUED | OUTPATIENT
Start: 2018-11-21 | End: 2018-11-21 | Stop reason: HOSPADM

## 2018-11-21 RX ORDER — PROPOFOL 10 MG/ML
VIAL (ML) INTRAVENOUS CONTINUOUS PRN
Status: DISCONTINUED | OUTPATIENT
Start: 2018-11-21 | End: 2018-11-21

## 2018-11-21 RX ORDER — SODIUM CHLORIDE 0.9 % (FLUSH) 0.9 %
3 SYRINGE (ML) INJECTION
Status: DISCONTINUED | OUTPATIENT
Start: 2018-11-21 | End: 2018-11-21 | Stop reason: HOSPADM

## 2018-11-21 RX ORDER — LIDOCAINE HCL/PF 100 MG/5ML
SYRINGE (ML) INTRAVENOUS
Status: DISCONTINUED | OUTPATIENT
Start: 2018-11-21 | End: 2018-11-21

## 2018-11-21 RX ORDER — PROPOFOL 10 MG/ML
VIAL (ML) INTRAVENOUS
Status: DISCONTINUED | OUTPATIENT
Start: 2018-11-21 | End: 2018-11-21

## 2018-11-21 RX ADMIN — PROPOFOL 80 MG: 10 INJECTION, EMULSION INTRAVENOUS at 10:11

## 2018-11-21 RX ADMIN — PROPOFOL 150 MCG/KG/MIN: 10 INJECTION, EMULSION INTRAVENOUS at 10:11

## 2018-11-21 RX ADMIN — LIDOCAINE HYDROCHLORIDE 100 MG: 20 INJECTION, SOLUTION INTRAVENOUS at 10:11

## 2018-11-21 RX ADMIN — SODIUM CHLORIDE: 0.9 INJECTION, SOLUTION INTRAVENOUS at 09:11

## 2018-11-21 NOTE — INTERVAL H&P NOTE
The patient has been examined and the H&P has been reviewed:    I concur with the findings and no changes have occurred since H&P was written.    Anesthesia/Surgery risks, benefits and alternative options discussed and understood by patient/family.          Active Hospital Problems    Diagnosis  POA    Screen for colon cancer [Z12.11]  Not Applicable      Resolved Hospital Problems   No resolved problems to display.

## 2018-11-21 NOTE — TRANSFER OF CARE
"Anesthesia Transfer of Care Note    Patient: Lakia Wright    Procedure(s) Performed: Procedure(s) (LRB):  COLONOSCOPY Clenpiq (N/A)    Patient location: GI    Anesthesia Type: MAC    Transport from OR: Transported from OR on room air with adequate spontaneous ventilation    Post pain: adequate analgesia    Post assessment: no apparent anesthetic complications and tolerated procedure well    Post vital signs: stable    Level of consciousness: awake, alert and oriented    Nausea/Vomiting: no nausea/vomiting    Complications: none    Transfer of care protocol was followed      Last vitals:   Visit Vitals  BP (!) 113/59   Pulse 62   Temp 36.6 °C (97.9 °F)   Resp 16   Ht 5' 1" (1.549 m)   Wt 53.5 kg (118 lb)   SpO2 99%   Breastfeeding? No   BMI 22.30 kg/m²     "

## 2018-11-21 NOTE — PROVATION PATIENT INSTRUCTIONS
Discharge Summary/Instructions after an Endoscopic Procedure  Patient Name: Lakia Wright  Patient MRN: [id number]  Patient YOB: 1965 Wednesday, November 21, 2018  Maria Alejandra Laguna MD  RESTRICTIONS:  During your procedure today, you received medications for sedation.  These   medications may affect your judgment, balance and coordination.  Therefore,   for 24 hours, you have the following restrictions:   - DO NOT drive a car, operate machinery, make legal/financial decisions,   sign important papers or drink alcohol.    ACTIVITY:  Today: no heavy lifting, straining or running due to procedural   sedation/anesthesia.  The following day: return to full activity including work.  DIET:  Eat and drink normally unless instructed otherwise.     TREATMENT FOR COMMON SIDE EFFECTS:  - Mild abdominal pain, nausea, belching, bloating or excessive gas:  rest,   eat lightly and use a heating pad.  - Sore Throat: treat with throat lozenges and/or gargle with warm salt   water.  - Because air was used during the procedure, expelling large amounts of air   from your rectum or belching is normal.  - If a bowel prep was taken, you may not have a bowel movement for 1-3 days.    This is normal.  SYMPTOMS TO WATCH FOR AND REPORT TO YOUR PHYSICIAN:  1. Abdominal pain or bloating, other than gas cramps.  2. Chest pain.  3. Back pain.  4. Signs of infection such as: chills or fever occurring within 24 hours   after the procedure.  5. Rectal bleeding, which would show as bright red, maroon, or black stools.   (A tablespoon of blood from the rectum is not serious, especially if   hemorrhoids are present.)  6. Vomiting.  7. Weakness or dizziness.  GO DIRECTLY TO THE NEAREST EMERGENCY ROOM IF YOU HAVE ANY OF THE FOLLOWING:      Difficulty breathing              Chills and/or fever over 101 F   Persistent vomiting and/or vomiting blood   Severe abdominal pain   Severe chest pain   Black, tarry stools   Bleeding- more than one  tablespoon   Any other symptom or condition that you feel may need urgent attention  Your doctor recommends these additional instructions:  If any biopsies were taken, your doctors clinic will contact you in 1 to 2   weeks with any results.  - Discharge patient to home.   - Patient has a contact number available for emergencies.  The signs and   symptoms of potential delayed complications were discussed with the   patient.  Return to normal activities tomorrow.  Written discharge   instructions were provided to the patient.   - Resume previous diet.   - Continue present medications.   - Await pathology results.   - Repeat colonoscopy for surveillance based on pathology results.   - Return to GI clinic PRN.  For questions, problems or results please call your physician - Maria Alejandra Laguna MD at Work:  [phone].  EMERGENCY PHONE NUMBER: [phone],  LAB RESULTS: [phone]  IF A COMPLICATION OR EMERGENCY SITUATION ARISES AND YOU ARE UNABLE TO REACH   YOUR PHYSICIAN - GO DIRECTLY TO THE EMERGENCY ROOM.  MD Maria Alejandra Patel MD  11/21/2018 10:51:02 AM  This report has been verified and signed electronically.  [employer]ATION

## 2018-11-21 NOTE — ANESTHESIA POSTPROCEDURE EVALUATION
"Anesthesia Post Evaluation    Patient: Lakia Wright    Procedure(s) Performed: Procedure(s) (LRB):  COLONOSCOPY Clenpiq (N/A)    Final Anesthesia Type: MAC  Patient location during evaluation: GI PACU  Patient participation: Yes- Able to Participate  Level of consciousness: oriented and awake and alert  Post-procedure vital signs: reviewed and stable  Pain management: adequate  Airway patency: patent  PONV status at discharge: No PONV  Anesthetic complications: no      Cardiovascular status: blood pressure returned to baseline, hemodynamically stable and stable  Respiratory status: spontaneous ventilation, room air and unassisted  Hydration status: euvolemic  Follow-up not needed.        Visit Vitals  BP (!) 113/59   Pulse 62   Temp 36.6 °C (97.9 °F)   Resp 16   Ht 5' 1" (1.549 m)   Wt 53.5 kg (118 lb)   SpO2 99%   Breastfeeding? No   BMI 22.30 kg/m²       Pain/[place] Score: Pain Assessment Performed: Yes (11/21/2018  9:42 AM)  Presence of Pain: denies (11/21/2018  9:42 AM)        "

## 2018-11-21 NOTE — ANESTHESIA PREPROCEDURE EVALUATION
2018  Lakia Wright is a 53 y.o., female for colonoscopy    Review of patient's allergies indicates:   Allergen Reactions    Pcn [penicillins] Rash     Past Medical History:   Diagnosis Date    Depression      Past Surgical History:   Procedure Laterality Date     SECTION, LOW TRANSVERSE       Patient Active Problem List   Diagnosis    Depression     Wt Readings from Last 3 Encounters:   18 55.7 kg (122 lb 12.7 oz)   18 53.5 kg (117 lb 15.1 oz)   17 56.5 kg (124 lb 9 oz)     Temp Readings from Last 3 Encounters:   18 36.5 °C (97.7 °F) (Oral)   17 36.7 °C (98 °F) (Oral)   17 36.9 °C (98.4 °F) (Oral)     BP Readings from Last 3 Encounters:   18 (!) 98/54   18 (!) 94/59   17 (!) 100/50     Pulse Readings from Last 3 Encounters:   18 62   17 67   17 69         Anesthesia Evaluation    I have reviewed the Patient Summary Reports.        Review of Systems      Physical Exam  General:  Well nourished    Airway/Jaw/Neck:  Airway Findings: Mouth Opening: Normal Tongue: Normal  General Airway Assessment: Adult  Mallampati: II  Improves to II with phonation.  TM Distance: 4 - 6 cm       Chest/Lungs:  Chest/Lungs Findings: Clear to auscultation, Normal Respiratory Rate     Heart/Vascular:  Heart Findings: Rate: Normal  Rhythm: Regular Rhythm  Sounds: Normal        Mental Status:  Mental Status Findings:  Cooperative, Alert and Oriented       Lab Results   Component Value Date    WBC 6.79 11/10/2016    HGB 14.9 11/10/2016    HCT 45.4 11/10/2016    MCV 97 11/10/2016     11/10/2016       Chemistry        Component Value Date/Time     11/10/2016 0709    K 4.4 11/10/2016 0709     11/10/2016 0709    CO2 27 11/10/2016 0709    BUN 15 11/10/2016 0709    CREATININE 0.7 11/10/2016 0709    GLU 82 11/10/2016 0709         Component Value Date/Time    CALCIUM 9.6 11/10/2016 0709    ALKPHOS 56 11/10/2016 0709    AST 19 11/10/2016 0709    ALT 14 11/10/2016 0709    BILITOT 0.6 11/10/2016 0709    ESTGFRAFRICA >60.0 11/10/2016 0709    EGFRNONAA >60.0 11/10/2016 0709            Anesthesia Plan  Type of Anesthesia, risks & benefits discussed:  Anesthesia Type:  MAC  Patient's Preference:   Intra-op Monitoring Plan:   Intra-op Monitoring Plan Comments:   Post Op Pain Control Plan:   Post Op Pain Control Plan Comments:   Induction:   IV  Beta Blocker:         Informed Consent: Patient understands risks and agrees with Anesthesia plan.  Questions answered. Anesthesia consent signed with patient.  ASA Score: 2     Day of Surgery Review of History & Physical: I have interviewed and examined the patient. I have reviewed the patient's H&P dated:  There are no significant changes.  H&P update referred to the provider.  H&P completed by Anesthesiologist.       Ready For Surgery From Anesthesia Perspective.

## 2018-11-26 ENCOUNTER — TELEPHONE (OUTPATIENT)
Dept: FAMILY MEDICINE | Facility: CLINIC | Age: 53
End: 2018-11-26

## 2018-11-26 NOTE — TELEPHONE ENCOUNTER
----- Message from Carmine Plaza sent at 11/26/2018  2:17 PM CST -----  Type: Needs Medical Advice    Who Called:  Patient    Best Call Back Number: [phone]  Additional Information: Patient wants to know why a fasting lab was scheduled for her on 12/03.  Please call to advise

## 2018-12-05 ENCOUNTER — TELEPHONE (OUTPATIENT)
Dept: GASTROENTEROLOGY | Facility: CLINIC | Age: 53
End: 2018-12-05

## 2019-05-27 RX ORDER — FLUTICASONE PROPIONATE 50 MCG
SPRAY, SUSPENSION (ML) NASAL
Qty: 16 G | Refills: 11 | Status: SHIPPED | OUTPATIENT
Start: 2019-05-27 | End: 2020-09-28 | Stop reason: SDUPTHER

## 2019-06-11 ENCOUNTER — TELEPHONE (OUTPATIENT)
Dept: FAMILY MEDICINE | Facility: CLINIC | Age: 54
End: 2019-06-11

## 2019-06-11 NOTE — TELEPHONE ENCOUNTER
Left message a callback.  Patient would need to be scheduled for first available to [facility] care.

## 2019-07-02 ENCOUNTER — DOCUMENTATION ONLY (OUTPATIENT)
Dept: FAMILY MEDICINE | Facility: CLINIC | Age: 54
End: 2019-07-02

## 2019-07-02 NOTE — PROGRESS NOTES
Pre-Visit Chart Review  For Appointment Scheduled on 07/03/2019    There are no preventive care reminders to display for this patient.

## 2019-07-08 ENCOUNTER — LAB VISIT (OUTPATIENT)
Dept: LAB | Facility: HOSPITAL | Age: 54
End: 2019-07-08
Attending: FAMILY MEDICINE
Payer: COMMERCIAL

## 2019-07-08 DIAGNOSIS — Z00.00 ANNUAL PHYSICAL EXAM: ICD-10-CM

## 2019-07-08 DIAGNOSIS — Z13.220 SCREENING CHOLESTEROL LEVEL: ICD-10-CM

## 2019-07-08 LAB
25(OH)D3+25(OH)D2 SERPL-MCNC: 25 NG/ML (ref 30–96)
ALBUMIN SERPL BCP-MCNC: 3.9 G/DL (ref 3.5–5.2)
ALP SERPL-CCNC: 55 U/L (ref 55–135)
ALT SERPL W/O P-5'-P-CCNC: 22 U/L (ref 10–44)
ANION GAP SERPL CALC-SCNC: 6 MMOL/L (ref 8–16)
AST SERPL-CCNC: 20 U/L (ref 10–40)
BASOPHILS # BLD AUTO: 0.01 K/UL (ref 0–0.2)
BASOPHILS NFR BLD: 0.2 % (ref 0–1.9)
BILIRUB SERPL-MCNC: 0.5 MG/DL (ref 0.1–1)
BUN SERPL-MCNC: 17 MG/DL (ref 6–20)
CALCIUM SERPL-MCNC: 9.8 MG/DL (ref 8.7–10.5)
CHLORIDE SERPL-SCNC: 107 MMOL/L (ref 95–110)
CHOLEST SERPL-MCNC: 201 MG/DL (ref 120–199)
CHOLEST/HDLC SERPL: 3.7 {RATIO} (ref 2–5)
CO2 SERPL-SCNC: 28 MMOL/L (ref 23–29)
CREAT SERPL-MCNC: 0.7 MG/DL (ref 0.5–1.4)
DIFFERENTIAL METHOD: NORMAL
EOSINOPHIL # BLD AUTO: 0.1 K/UL (ref 0–0.5)
EOSINOPHIL NFR BLD: 0.9 % (ref 0–8)
ERYTHROCYTE [DISTWIDTH] IN BLOOD BY AUTOMATED COUNT: 14.1 % (ref 11.5–14.5)
EST. GFR  (AFRICAN AMERICAN): >60 ML/MIN/1.73 M^2
EST. GFR  (NON AFRICAN AMERICAN): >60 ML/MIN/1.73 M^2
GLUCOSE SERPL-MCNC: 86 MG/DL (ref 70–110)
HCT VFR BLD AUTO: 42.2 % (ref 37–48.5)
HDLC SERPL-MCNC: 55 MG/DL (ref 40–75)
HDLC SERPL: 27.4 % (ref 20–50)
HGB BLD-MCNC: 13.7 G/DL (ref 12–16)
LDLC SERPL CALC-MCNC: 128.8 MG/DL (ref 63–159)
LYMPHOCYTES # BLD AUTO: 1.5 K/UL (ref 1–4.8)
LYMPHOCYTES NFR BLD: 26.9 % (ref 18–48)
MCH RBC QN AUTO: 30.6 PG (ref 27–31)
MCHC RBC AUTO-ENTMCNC: 32.5 G/DL (ref 32–36)
MCV RBC AUTO: 94 FL (ref 82–98)
MONOCYTES # BLD AUTO: 0.4 K/UL (ref 0.3–1)
MONOCYTES NFR BLD: 7.7 % (ref 4–15)
NEUTROPHILS # BLD AUTO: 3.5 K/UL (ref 1.8–7.7)
NEUTROPHILS NFR BLD: 64.3 % (ref 38–73)
NONHDLC SERPL-MCNC: 146 MG/DL
PLATELET # BLD AUTO: 236 K/UL (ref 150–350)
PMV BLD AUTO: 10.3 FL (ref 9.2–12.9)
POTASSIUM SERPL-SCNC: 4.3 MMOL/L (ref 3.5–5.1)
PROT SERPL-MCNC: 6.9 G/DL (ref 6–8.4)
RBC # BLD AUTO: 4.47 M/UL (ref 4–5.4)
SODIUM SERPL-SCNC: 141 MMOL/L (ref 136–145)
TRIGL SERPL-MCNC: 86 MG/DL (ref 30–150)
TSH SERPL DL<=0.005 MIU/L-ACNC: 2.26 UIU/ML (ref 0.4–4)
WBC # BLD AUTO: 5.42 K/UL (ref 3.9–12.7)

## 2019-07-08 PROCEDURE — 84443 ASSAY THYROID STIM HORMONE: CPT

## 2019-07-08 PROCEDURE — 85025 COMPLETE CBC W/AUTO DIFF WBC: CPT

## 2019-07-08 PROCEDURE — 82306 VITAMIN D 25 HYDROXY: CPT

## 2019-07-08 PROCEDURE — 80061 LIPID PANEL: CPT

## 2019-07-08 PROCEDURE — 36415 COLL VENOUS BLD VENIPUNCTURE: CPT

## 2019-07-08 PROCEDURE — 80053 COMPREHEN METABOLIC PANEL: CPT

## 2019-09-18 ENCOUNTER — OFFICE VISIT (OUTPATIENT)
Dept: INTERNAL MEDICINE | Facility: CLINIC | Age: 54
End: 2019-09-18
Payer: COMMERCIAL

## 2019-09-18 VITALS
SYSTOLIC BLOOD PRESSURE: 110 MMHG | HEART RATE: 70 BPM | DIASTOLIC BLOOD PRESSURE: 70 MMHG | OXYGEN SATURATION: 97 % | HEIGHT: 61 IN | BODY MASS INDEX: 23.13 KG/M2 | TEMPERATURE: 98 F | WEIGHT: 122.5 LBS

## 2019-09-18 DIAGNOSIS — E78.5 DYSLIPIDEMIA (HIGH LDL; LOW HDL): ICD-10-CM

## 2019-09-18 DIAGNOSIS — Z78.9 PARTICIPANT IN HEALTH AND WELLNESS PLAN: Primary | ICD-10-CM

## 2019-09-18 PROCEDURE — 3008F BODY MASS INDEX DOCD: CPT | Mod: CPTII,S$GLB,, | Performed by: INTERNAL MEDICINE

## 2019-09-18 PROCEDURE — 99999 PR PBB SHADOW E&M-EST. PATIENT-LVL III: ICD-10-PCS | Mod: PBBFAC,,, | Performed by: INTERNAL MEDICINE

## 2019-09-18 PROCEDURE — 99214 PR OFFICE/OUTPT VISIT, EST, LEVL IV, 30-39 MIN: ICD-10-PCS | Mod: S$GLB,,, | Performed by: INTERNAL MEDICINE

## 2019-09-18 PROCEDURE — 99999 PR PBB SHADOW E&M-EST. PATIENT-LVL III: CPT | Mod: PBBFAC,,, | Performed by: INTERNAL MEDICINE

## 2019-09-18 PROCEDURE — 3008F PR BODY MASS INDEX (BMI) DOCUMENTED: ICD-10-PCS | Mod: CPTII,S$GLB,, | Performed by: INTERNAL MEDICINE

## 2019-09-18 PROCEDURE — 99214 OFFICE O/P EST MOD 30 MIN: CPT | Mod: S$GLB,,, | Performed by: INTERNAL MEDICINE

## 2019-09-18 RX ORDER — ESTRADIOL 0.1 MG/G
1 CREAM VAGINAL
Refills: 3 | COMMUNITY
Start: 2019-07-01 | End: 2023-10-11 | Stop reason: SDUPTHER

## 2019-09-29 NOTE — PROGRESS NOTES
INTERNAL MEDICINE    Patient Active Problem List   Diagnosis    Depression    Screen for colon cancer       CC:   Chief Complaint   Patient presents with    Annual Exam    Establish Care       SUBJECTIVE:  Lakia Wright   is a 54 y.o. female  HPI   54y/oWF here for her annual exam.  New to me.    PMH: Depression, C section.  SH: . No noxious habits. 2 children. Sometimes exercises. Allergic : PCN  FH: Father had DM. No cancer,heart disease.    ROS: Review of Systems   Constitutional: Negative.  Negative for fatigue.   HENT: Negative.    Eyes: Negative.  Negative for visual disturbance.   Respiratory: Negative.  Negative for cough, choking, shortness of breath and wheezing.    Cardiovascular: Negative.  Negative for palpitations and leg swelling.   Gastrointestinal: Negative.  Negative for blood in stool, constipation, diarrhea, nausea and vomiting.   Endocrine: Negative.    Genitourinary: Negative.  Negative for dysuria, hematuria and urgency.   Musculoskeletal: Negative.    Skin: Negative.    Allergic/Immunologic: Negative.    Neurological: Negative.    Hematological: Negative.    Psychiatric/Behavioral: Negative.        Past Medical History:   Diagnosis Date    Depression        Past Surgical History:   Procedure Laterality Date     SECTION, LOW TRANSVERSE      COLONOSCOPY N/A 2018    Procedure: COLONOSCOPY Clenpiq;  Surgeon: Maria Alejandra Laguna MD;  Location: [facility];  Service: Endoscopy;  Laterality: N/A;       Family History   Problem Relation Age of Onset    Diabetes Father     Hyperlipidemia Father     Hypertension Father     Stroke Father     Coronary artery disease Paternal Grandfather     Melanoma Neg Hx     Psoriasis Neg Hx     Lupus Neg Hx        Social History     Tobacco Use    Smoking status: Never Smoker    Smokeless tobacco: Never Used   Substance Use Topics    Alcohol use: No    Drug use: No       Social History     Social History Narrative    Tries to  "  do exercise regularly but has not recently    Tries to eat healthy diet but has been slacked off recently           ALLERGIES:   Review of patient's allergies indicates:   Allergen Reactions    Pcn [penicillins] Rash       MEDS:   Current Outpatient Medications:     estradiol (ESTRACE) 0.01 % (0.1 mg/gram) vaginal cream, Place 1 g vaginally twice a week., Disp: , Rfl: 3    estradiol-levonorgestrel (CLIMARA PRO) 0.045-0.015 mg/24 hr, Apply 1 patch topically every 7 days., Disp: , Rfl:     fluticasone propionate (FLONASE) 50 mcg/actuation nasal spray, USE 2 SPRAY(S) IN EACH NOSTRIL ONCE DAILY AS NEEDED OR  RHINITIS, Disp: 16 g, Rfl: 11    tretinoin (RETIN-A) 0.05 % cream, , Disp: , Rfl:     sod picosulf-mag ox-citric ac (CLENPIQ) 10 mg-3.5 gram -12 gram/160 mL Soln, Take 1 kit by mouth As instructed., Disp: 2 Bottle, Rfl: 0    OBJECTIVE:   Vitals:    09/18/19 1532   BP: 110/70   BP Location: Right arm   Patient Position: Sitting   BP Method: Medium (Manual)   Pulse: 70   Temp: 98 °F (36.7 °C)   TempSrc: Oral   SpO2: 97%   Weight: 55.6 kg (122 lb 8 oz)   Height: 5' 1" (1.549 m)     Body mass index is 23.15 kg/m².    Physical Exam   Constitutional: She is oriented to person, place, and time. She appears well-developed and well-nourished. No distress.   HENT:   Head: Normocephalic and atraumatic. Not macrocephalic and not microcephalic. Head is without raccoon's eyes, without Hunter's sign, without abrasion, without contusion, without laceration, without right periorbital erythema and without left periorbital erythema. Hair is normal.   Right Ear: Hearing, tympanic membrane, external ear and ear canal normal.   Left Ear: Hearing, tympanic membrane, external ear and ear canal normal.   Nose: Nose normal. Right sinus exhibits no maxillary sinus tenderness and no frontal sinus tenderness. Left sinus exhibits no maxillary sinus tenderness and no frontal sinus tenderness.   Mouth/Throat: Uvula is midline and " oropharynx is clear and moist. She does not have dentures. No oral lesions. No trismus in the jaw. Normal dentition. No dental abscesses, uvula swelling, lacerations or dental caries. No posterior oropharyngeal edema or posterior oropharyngeal erythema.   Eyes: Pupils are equal, round, and reactive to light. Conjunctivae, EOM and lids are normal. Lids are everted and swept, no foreign bodies found.   Neck: Trachea normal, normal range of motion, full passive range of motion without pain and phonation normal. Neck supple. Normal carotid pulses, no hepatojugular reflux and no JVD present. No spinous process tenderness and no muscular tenderness present. Carotid bruit is not present. No neck rigidity. No edema, no erythema and normal range of motion present. No Brudzinski's sign and no Kernig's sign noted. No thyroid mass and no thyromegaly present.   Cardiovascular: Normal rate, regular rhythm, S1 normal, S2 normal, normal heart sounds, intact distal pulses and normal pulses.  No extrasystoles are present. PMI is not displaced.   Pulses:       Carotid pulses are 2+ on the right side, and 2+ on the left side.       Radial pulses are 2+ on the right side, and 2+ on the left side.        Femoral pulses are 2+ on the right side, and 2+ on the left side.       Popliteal pulses are 2+ on the right side, and 2+ on the left side.        Dorsalis pedis pulses are 2+ on the right side, and 2+ on the left side.        Posterior tibial pulses are 2+ on the right side, and 2+ on the left side.   Pulmonary/Chest: Effort normal and breath sounds normal. Chest wall is not dull to percussion. She exhibits no mass, no bony tenderness, no laceration, no crepitus, no edema, no deformity, no swelling and no retraction. Right breast exhibits no inverted nipple, no mass, no nipple discharge, no skin change and no tenderness. Left breast exhibits no inverted nipple, no mass, no nipple discharge, no skin change and no tenderness. No breast  swelling, tenderness, discharge or bleeding. Breasts are symmetrical.   Abdominal: Soft. Normal appearance, normal aorta and bowel sounds are normal. There is no hepatosplenomegaly. There is no CVA tenderness.   Genitourinary: No breast swelling, tenderness, discharge or bleeding.   Musculoskeletal: Normal range of motion.   Lymphadenopathy:        Head (right side): No submental, no submandibular, no tonsillar, no preauricular, no posterior auricular and no occipital adenopathy present.        Head (left side): No submental, no submandibular, no tonsillar, no preauricular, no posterior auricular and no occipital adenopathy present.     She has no cervical adenopathy.     She has no axillary adenopathy.        Right: No inguinal, no supraclavicular and no epitrochlear adenopathy present.        Left: No inguinal, no supraclavicular and no epitrochlear adenopathy present.   Neurological: She is alert and oriented to person, place, and time. She has normal strength. No sensory deficit. She displays a negative Romberg sign.   Skin: Skin is warm, dry and intact. No rash noted. She is not diaphoretic. No cyanosis. Nails show no clubbing.   Psychiatric: She has a normal mood and affect. Her speech is normal and behavior is normal. Judgment and thought content normal. Cognition and memory are normal.   Nursing note and vitals reviewed.        PERTINENT RESULTS:   CBC:  Recent Labs   Lab Result Units 07/08/19  1044   WBC K/uL 5.42   RBC M/uL 4.47   Hemoglobin g/dL 13.7   Hematocrit % 42.2   Platelets K/uL 236   Mean Corpuscular Volume fL 94   Mean Corpuscular Hemoglobin pg 30.6   Mean Corpuscular Hemoglobin Conc g/dL 32.5     CMP:  Recent Labs   Lab Result Units 07/08/19  1044   Glucose mg/dL 86   Calcium mg/dL 9.8   Albumin g/dL 3.9   Total Protein g/dL 6.9   Sodium mmol/L 141   Potassium mmol/L 4.3   CO2 mmol/L 28   Chloride mmol/L 107   BUN, Bld mg/dL 17   Alkaline Phosphatase U/L 55   ALT U/L 22   AST U/L 20   Total  Bilirubin mg/dL 0.5     URINALYSIS:  No results for input(s): COLORU, CLARITYU, SPECGRAV, PHUR, PROTEINUA, GLUCOSEU, BILIRUBINCON, BLOODU, WBCU, RBCU, BACTERIA, MUCUS, NITRITE, LEUKOCYTESUR, UROBILINOGEN, HYALINECASTS in the last 2160 hours.   LIPIDS:  Recent Labs   Lab Result Units 07/08/19  1044   TSH uIU/mL 2.261   HDL mg/dL 55   Cholesterol mg/dL 201*   Triglycerides mg/dL 86   LDL Cholesterol mg/dL 128.8   Hdl/Cholesterol Ratio % 27.4   Non-HDL Cholesterol mg/dL 146   Total Cholesterol/HDL Ratio  3.7             ASSESSMENT:  Problem List Items Addressed This Visit     None      Visit Diagnoses     Participant in health and wellness plan    -  Primary    Dyslipidemia (high LDL; low HDL)        Relevant Orders    Lipid panel          PLAN:   Orders Placed This Encounter    Lipid panel     Orders Placed This Encounter   Procedures    Lipid panel     Standing Status:   Future     Standing Expiration Date:   9/17/2020       Follow-up with me in 6 months unless labs again reveal hyperlipidemia.  Dr. Donny Greenfield  Internal Medicine

## 2020-09-28 ENCOUNTER — PATIENT MESSAGE (OUTPATIENT)
Dept: FAMILY MEDICINE | Facility: CLINIC | Age: 55
End: 2020-09-28

## 2020-09-28 ENCOUNTER — LAB VISIT (OUTPATIENT)
Dept: LAB | Facility: HOSPITAL | Age: 55
End: 2020-09-28
Attending: FAMILY MEDICINE
Payer: COMMERCIAL

## 2020-09-28 ENCOUNTER — OFFICE VISIT (OUTPATIENT)
Dept: FAMILY MEDICINE | Facility: CLINIC | Age: 55
End: 2020-09-28
Payer: COMMERCIAL

## 2020-09-28 VITALS
SYSTOLIC BLOOD PRESSURE: 124 MMHG | TEMPERATURE: 98 F | OXYGEN SATURATION: 98 % | DIASTOLIC BLOOD PRESSURE: 78 MMHG | WEIGHT: 128.06 LBS | BODY MASS INDEX: 24.18 KG/M2 | HEIGHT: 61 IN | HEART RATE: 60 BPM

## 2020-09-28 DIAGNOSIS — Z00.00 ROUTINE GENERAL MEDICAL EXAMINATION AT A HEALTH CARE FACILITY: ICD-10-CM

## 2020-09-28 DIAGNOSIS — Z11.4 SCREENING FOR HIV WITHOUT PRESENCE OF RISK FACTORS: ICD-10-CM

## 2020-09-28 DIAGNOSIS — Z00.00 ROUTINE GENERAL MEDICAL EXAMINATION AT A HEALTH CARE FACILITY: Primary | ICD-10-CM

## 2020-09-28 DIAGNOSIS — R76.8 ABNORMAL HEPATITIS SEROLOGY: ICD-10-CM

## 2020-09-28 LAB
ALBUMIN SERPL BCP-MCNC: 4.1 G/DL (ref 3.5–5.2)
ALP SERPL-CCNC: 78 U/L (ref 55–135)
ALT SERPL W/O P-5'-P-CCNC: 27 U/L (ref 10–44)
ANION GAP SERPL CALC-SCNC: 10 MMOL/L (ref 8–16)
AST SERPL-CCNC: 28 U/L (ref 10–40)
BASOPHILS # BLD AUTO: 0.04 K/UL (ref 0–0.2)
BASOPHILS NFR BLD: 0.8 % (ref 0–1.9)
BILIRUB SERPL-MCNC: 0.4 MG/DL (ref 0.1–1)
BUN SERPL-MCNC: 13 MG/DL (ref 6–20)
CALCIUM SERPL-MCNC: 9.5 MG/DL (ref 8.7–10.5)
CHLORIDE SERPL-SCNC: 105 MMOL/L (ref 95–110)
CHOLEST SERPL-MCNC: 204 MG/DL (ref 120–199)
CHOLEST/HDLC SERPL: 3 {RATIO} (ref 2–5)
CO2 SERPL-SCNC: 26 MMOL/L (ref 23–29)
CREAT SERPL-MCNC: 0.7 MG/DL (ref 0.5–1.4)
DIFFERENTIAL METHOD: NORMAL
EOSINOPHIL # BLD AUTO: 0.1 K/UL (ref 0–0.5)
EOSINOPHIL NFR BLD: 1 % (ref 0–8)
ERYTHROCYTE [DISTWIDTH] IN BLOOD BY AUTOMATED COUNT: 13.3 % (ref 11.5–14.5)
EST. GFR  (AFRICAN AMERICAN): >60 ML/MIN/1.73 M^2
EST. GFR  (NON AFRICAN AMERICAN): >60 ML/MIN/1.73 M^2
GLUCOSE SERPL-MCNC: 83 MG/DL (ref 70–110)
HCT VFR BLD AUTO: 42.3 % (ref 37–48.5)
HDLC SERPL-MCNC: 67 MG/DL (ref 40–75)
HDLC SERPL: 32.8 % (ref 20–50)
HGB BLD-MCNC: 13.9 G/DL (ref 12–16)
IMM GRANULOCYTES # BLD AUTO: 0.01 K/UL (ref 0–0.04)
IMM GRANULOCYTES NFR BLD AUTO: 0.2 % (ref 0–0.5)
LDLC SERPL CALC-MCNC: 118 MG/DL (ref 63–159)
LYMPHOCYTES # BLD AUTO: 1.6 K/UL (ref 1–4.8)
LYMPHOCYTES NFR BLD: 31.6 % (ref 18–48)
MCH RBC QN AUTO: 30.8 PG (ref 27–31)
MCHC RBC AUTO-ENTMCNC: 32.9 G/DL (ref 32–36)
MCV RBC AUTO: 94 FL (ref 82–98)
MONOCYTES # BLD AUTO: 0.4 K/UL (ref 0.3–1)
MONOCYTES NFR BLD: 7.3 % (ref 4–15)
NEUTROPHILS # BLD AUTO: 2.9 K/UL (ref 1.8–7.7)
NEUTROPHILS NFR BLD: 59.1 % (ref 38–73)
NONHDLC SERPL-MCNC: 137 MG/DL
NRBC BLD-RTO: 0 /100 WBC
PLATELET # BLD AUTO: 193 K/UL (ref 150–350)
PMV BLD AUTO: 11.2 FL (ref 9.2–12.9)
POTASSIUM SERPL-SCNC: 4.1 MMOL/L (ref 3.5–5.1)
PROT SERPL-MCNC: 7.1 G/DL (ref 6–8.4)
RBC # BLD AUTO: 4.51 M/UL (ref 4–5.4)
SODIUM SERPL-SCNC: 141 MMOL/L (ref 136–145)
TRIGL SERPL-MCNC: 95 MG/DL (ref 30–150)
TSH SERPL DL<=0.005 MIU/L-ACNC: 2.21 UIU/ML (ref 0.4–4)
WBC # BLD AUTO: 4.93 K/UL (ref 3.9–12.7)

## 2020-09-28 PROCEDURE — 86703 HIV-1/HIV-2 1 RESULT ANTBDY: CPT

## 2020-09-28 PROCEDURE — 99396 PR PREVENTIVE VISIT,EST,40-64: ICD-10-PCS | Mod: S$GLB,,, | Performed by: FAMILY MEDICINE

## 2020-09-28 PROCEDURE — 3008F BODY MASS INDEX DOCD: CPT | Mod: CPTII,S$GLB,, | Performed by: FAMILY MEDICINE

## 2020-09-28 PROCEDURE — 80061 LIPID PANEL: CPT

## 2020-09-28 PROCEDURE — 87522 HEPATITIS C REVRS TRNSCRPJ: CPT

## 2020-09-28 PROCEDURE — 36415 COLL VENOUS BLD VENIPUNCTURE: CPT

## 2020-09-28 PROCEDURE — 3008F PR BODY MASS INDEX (BMI) DOCUMENTED: ICD-10-PCS | Mod: CPTII,S$GLB,, | Performed by: FAMILY MEDICINE

## 2020-09-28 PROCEDURE — 85025 COMPLETE CBC W/AUTO DIFF WBC: CPT

## 2020-09-28 PROCEDURE — 86803 HEPATITIS C AB TEST: CPT

## 2020-09-28 PROCEDURE — 99999 PR PBB SHADOW E&M-EST. PATIENT-LVL III: ICD-10-PCS | Mod: PBBFAC,,, | Performed by: FAMILY MEDICINE

## 2020-09-28 PROCEDURE — 80053 COMPREHEN METABOLIC PANEL: CPT

## 2020-09-28 PROCEDURE — 99999 PR PBB SHADOW E&M-EST. PATIENT-LVL III: CPT | Mod: PBBFAC,,, | Performed by: FAMILY MEDICINE

## 2020-09-28 PROCEDURE — 84443 ASSAY THYROID STIM HORMONE: CPT

## 2020-09-28 PROCEDURE — 99396 PREV VISIT EST AGE 40-64: CPT | Mod: S$GLB,,, | Performed by: FAMILY MEDICINE

## 2020-09-28 RX ORDER — FLUTICASONE PROPIONATE 50 MCG
SPRAY, SUSPENSION (ML) NASAL
Qty: 16 G | Refills: 11 | Status: SHIPPED | OUTPATIENT
Start: 2020-09-28 | End: 2022-03-14 | Stop reason: SDUPTHER

## 2020-09-28 NOTE — PROGRESS NOTES
(Portions of this note were dictated using voice recognition software and may contain dictation related errors in spelling/grammar/syntax not found on text review)    CC:   Chief Complaint   Patient presents with    [facility]    Annual Exam       HPI: 55 y.o. female Last seen by me in .  Had most recently seen Dr. Greenfield 2019 for annual exam.    History of depression, was on Zoloft and Wellbutrin in the past but currently on fluoxetine 20 mg daily (from Dr. Leola MEREDITH:).  Mood is well controlled    Was trying to donate blood recently and was told she had a positive hepatitis-C antibody.  Chart review shows in 2016 hepatitis C antibody was negative.  She denies any symptoms of hepatitis.  No known exposures (tattoos, sexual contact, IV drug use, blood transfusion before ).    Occasional Flonase for allergies, needs refill    Past Medical History:   Diagnosis Date    Depression        Past Surgical History:   Procedure Laterality Date     SECTION, LOW TRANSVERSE      COLONOSCOPY N/A 2018    Procedure: COLONOSCOPY Clenpiq;  Surgeon: Maria Alejandra Laguna MD;  Location: [facility];  Service: Endoscopy;  Laterality: N/A;       Family History   Problem Relation Age of Onset    Diabetes Father     Hyperlipidemia Father     Hypertension Father     Stroke Father     Coronary artery disease Paternal Grandfather     Melanoma Neg Hx     Psoriasis Neg Hx     Lupus Neg Hx        Social History     Tobacco Use    Smoking status: Never Smoker    Smokeless tobacco: Never Used   Substance Use Topics    Alcohol use: No    Drug use: No       Lab Results   Component Value Date    WBC 5.42 2019    HGB 13.7 2019    HCT 42.2 2019    MCV 94 2019     2019    CHOL 201 (H) 2019    TRIG 86 2019    HDL 55 2019    ALT 22 2019    AST 20 2019    BILITOT 0.5 2019    ALKPHOS 55 2019     2019    K 4.3 2019      07/08/2019    CREATININE 0.7 07/08/2019    ESTGFRAFRICA >60 07/08/2019    EGFRNONAA >60 07/08/2019    CALCIUM 9.8 07/08/2019    ALBUMIN 3.9 07/08/2019    BUN 17 07/08/2019    CO2 28 07/08/2019    TSH 2.261 07/08/2019    LDLCALC 128.8 07/08/2019    GLU 86 07/08/2019             Vital signs reviewed  PE:   APPEARANCE: Well nourished, well developed, in no acute distress.    HEAD: Normocephalic, atraumatic.  EYES: PERRL. EOMI.   Conjunctivae noninjected.  EARS: TM's intact. Light reflex normal. No retraction or perforation  NECK: Supple with no cervical lymphadenopathy.  No carotid bruits.  No thyromegaly  CHEST: Good inspiratory effort. Lungs clear to auscultation with no wheezes or crackles.  CARDIOVASCULAR: Normal S1, S2. No rubs, murmurs, or gallops.  ABDOMEN: Bowel sounds normal. Not distended. Soft. No tenderness or masses. No organomegaly.  EXTREMITIES: No edema, cyanosis, or clubbing.      IMPRESSION  1. Routine general medical examination at a health care facility    2. Screening for HIV without presence of risk factors    3. Abnormal hepatitis serology            PLAN  Screening labs below.  Will at hepatitis C antibody and viral RNA given discrepant readings on prior testing and implications for blood donations    Mood well controlled on fluoxetine from her gyn    Refill Flonase as needed    She is up-to-date on health screenings as below, she is advised to the provided records of the screenings to our office for further documentation para      SCREENINGS  Immunizations  tdap 2018  Flu: utd    Pap: Dr. Bhagat (2019--plans October visit)  Mmg: utd around June 2020   Colon screen:  Colonoscopy 2018, internal hemorrhoids, diverticulosis left side of the colon.  1 diminutive polyp splenic flexure, recall 10 years

## 2020-09-29 LAB
HCV AB SERPL QL IA: NEGATIVE
HIV 1+2 AB+HIV1 P24 AG SERPL QL IA: NEGATIVE

## 2020-10-02 ENCOUNTER — PATIENT MESSAGE (OUTPATIENT)
Dept: FAMILY MEDICINE | Facility: CLINIC | Age: 55
End: 2020-10-02

## 2020-10-02 LAB
HCV RNA SERPL NAA+PROBE-LOG IU: <1.08 LOG (10) IU/ML
HCV RNA SERPL QL NAA+PROBE: NOT DETECTED IU/ML
HCV RNA SPEC NAA+PROBE-ACNC: <12 IU/ML

## 2020-10-02 NOTE — TELEPHONE ENCOUNTER
Dear Lakia Wright     Your recent labs were reviewed and released to your account. Your lab results were normal.  Actually your hepatitis C lab was negative showing no antibodies to this and no virus load.  Therefore it should be safe to donate blood     Aristeo Juárez MD

## 2020-10-05 ENCOUNTER — PATIENT MESSAGE (OUTPATIENT)
Dept: FAMILY MEDICINE | Facility: CLINIC | Age: 55
End: 2020-10-05

## 2020-10-05 DIAGNOSIS — R76.8 ABNORMAL HEPATITIS SEROLOGY: Primary | ICD-10-CM

## 2020-10-06 NOTE — TELEPHONE ENCOUNTER
Orders Placed This Encounter   Procedures    Hepatitis B Surface Ab, Qualitative    Hepatitis B Surface Antigen    Hepatitis B Core Antibody, Total       I can draw order some hepatitis-B labs.  Note that antibodies to hepatitis-B without the actual antigen to hepatitis-B just signifies immunity to hepatitis-B and should not preclude you from donating blood.  I will check the various levels though just to make sure there is no concern for any current or active infection.  I will have my staff reach out regarding getting these labs    Aristeo Juárez MD     ===View-only below this line===      ----- Message -----     From: Lakia Wright     Sent: 10/5/2020  8:55 AM CDT       To: Aristeo Juárez MD  Subject: Test Results    Good Morning Dr. Juárez,    We spoke about me being unable to donate blood because of Hepatitis antibodies showing up in my blood and that it could have been a false positive.  At my last visit we screened my blood for Hepatitis C, but it is actually Hepatitis B that is showing up.  Could I be tested for Hepatitis B antibodies?

## 2020-10-13 ENCOUNTER — PATIENT MESSAGE (OUTPATIENT)
Dept: FAMILY MEDICINE | Facility: CLINIC | Age: 55
End: 2020-10-13

## 2020-10-20 ENCOUNTER — LAB VISIT (OUTPATIENT)
Dept: LAB | Facility: HOSPITAL | Age: 55
End: 2020-10-20
Attending: FAMILY MEDICINE
Payer: COMMERCIAL

## 2020-10-20 DIAGNOSIS — R76.8 ABNORMAL HEPATITIS SEROLOGY: ICD-10-CM

## 2020-10-20 PROCEDURE — 36415 COLL VENOUS BLD VENIPUNCTURE: CPT

## 2020-10-20 PROCEDURE — 87340 HEPATITIS B SURFACE AG IA: CPT

## 2020-10-20 PROCEDURE — 86706 HEP B SURFACE ANTIBODY: CPT

## 2020-10-20 PROCEDURE — 86704 HEP B CORE ANTIBODY TOTAL: CPT

## 2020-10-21 LAB
HBV CORE AB SERPL QL IA: POSITIVE
HBV SURFACE AB SER-ACNC: POSITIVE M[IU]/ML
HBV SURFACE AG SERPL QL IA: NEGATIVE

## 2020-10-22 ENCOUNTER — CLINICAL SUPPORT (OUTPATIENT)
Dept: OTHER | Facility: CLINIC | Age: 55
End: 2020-10-22
Payer: COMMERCIAL

## 2020-10-22 ENCOUNTER — PATIENT MESSAGE (OUTPATIENT)
Dept: FAMILY MEDICINE | Facility: CLINIC | Age: 55
End: 2020-10-22

## 2020-10-22 DIAGNOSIS — Z00.8 ENCOUNTER FOR OTHER GENERAL EXAMINATION: ICD-10-CM

## 2020-10-22 NOTE — TELEPHONE ENCOUNTER
Dear Lakia Wright     Your recent hepatitis-B panel was reviewed.  This shows that you likely had a past infection with this but this has resolved and you are immune to it now.  There is no current evidence of active hepatitis-B infection.     Aristeo Juárez MD

## 2020-10-23 VITALS — HEIGHT: 61 IN | BODY MASS INDEX: 24.2 KG/M2

## 2020-10-23 LAB
GLUCOSE SERPL-MCNC: 79 MG/DL (ref 60–140)
HDLC SERPL-MCNC: 67 MG/DL
POC CHOLESTEROL, LDL (DOCK): 108 MG/DL
POC CHOLESTEROL, TOTAL: 206 MG/DL
TRIGL SERPL-MCNC: 157 MG/DL

## 2020-10-27 ENCOUNTER — PATIENT MESSAGE (OUTPATIENT)
Dept: FAMILY MEDICINE | Facility: CLINIC | Age: 55
End: 2020-10-27

## 2021-02-26 ENCOUNTER — IMMUNIZATION (OUTPATIENT)
Dept: FAMILY MEDICINE | Facility: CLINIC | Age: 56
End: 2021-02-26
Payer: COMMERCIAL

## 2021-02-26 DIAGNOSIS — Z23 NEED FOR VACCINATION: Primary | ICD-10-CM

## 2021-02-26 PROCEDURE — 91300 COVID-19, MRNA, LNP-S, PF, 30 MCG/0.3 ML DOSE VACCINE: CPT | Mod: PBBFAC | Performed by: FAMILY MEDICINE

## 2021-03-19 ENCOUNTER — IMMUNIZATION (OUTPATIENT)
Dept: FAMILY MEDICINE | Facility: CLINIC | Age: 56
End: 2021-03-19
Payer: COMMERCIAL

## 2021-03-19 DIAGNOSIS — Z23 NEED FOR VACCINATION: Primary | ICD-10-CM

## 2021-03-19 PROCEDURE — 0002A COVID-19, MRNA, LNP-S, PF, 30 MCG/0.3 ML DOSE VACCINE: CPT | Mod: PBBFAC | Performed by: FAMILY MEDICINE

## 2021-03-19 PROCEDURE — 91300 COVID-19, MRNA, LNP-S, PF, 30 MCG/0.3 ML DOSE VACCINE: CPT | Mod: PBBFAC | Performed by: FAMILY MEDICINE

## 2021-07-06 ENCOUNTER — PATIENT MESSAGE (OUTPATIENT)
Dept: ADMINISTRATIVE | Facility: HOSPITAL | Age: 56
End: 2021-07-06

## 2021-07-16 ENCOUNTER — OFFICE VISIT (OUTPATIENT)
Dept: URGENT CARE | Facility: CLINIC | Age: 56
End: 2021-07-16
Payer: COMMERCIAL

## 2021-07-16 VITALS
TEMPERATURE: 98 F | DIASTOLIC BLOOD PRESSURE: 67 MMHG | RESPIRATION RATE: 18 BRPM | WEIGHT: 128 LBS | SYSTOLIC BLOOD PRESSURE: 113 MMHG | BODY MASS INDEX: 24.17 KG/M2 | OXYGEN SATURATION: 96 % | HEART RATE: 73 BPM | HEIGHT: 61 IN

## 2021-07-16 DIAGNOSIS — J02.9 SORE THROAT: ICD-10-CM

## 2021-07-16 DIAGNOSIS — U07.1 COVID-19: Primary | ICD-10-CM

## 2021-07-16 DIAGNOSIS — R05.9 COUGH: ICD-10-CM

## 2021-07-16 DIAGNOSIS — U07.1 COVID-19 VIRUS DETECTED: ICD-10-CM

## 2021-07-16 LAB
CTP QC/QA: YES
SARS-COV-2 RDRP RESP QL NAA+PROBE: POSITIVE

## 2021-07-16 PROCEDURE — U0002 COVID-19 LAB TEST NON-CDC: HCPCS | Mod: QW,S$GLB,, | Performed by: PHYSICIAN ASSISTANT

## 2021-07-16 PROCEDURE — 99214 PR OFFICE/OUTPT VISIT, EST, LEVL IV, 30-39 MIN: ICD-10-PCS | Mod: S$GLB,CS,, | Performed by: PHYSICIAN ASSISTANT

## 2021-07-16 PROCEDURE — 99214 OFFICE O/P EST MOD 30 MIN: CPT | Mod: S$GLB,CS,, | Performed by: PHYSICIAN ASSISTANT

## 2021-07-16 PROCEDURE — U0002: ICD-10-PCS | Mod: QW,S$GLB,, | Performed by: PHYSICIAN ASSISTANT

## 2021-07-16 PROCEDURE — 3008F PR BODY MASS INDEX (BMI) DOCUMENTED: ICD-10-PCS | Mod: CPTII,S$GLB,, | Performed by: PHYSICIAN ASSISTANT

## 2021-07-16 PROCEDURE — 3008F BODY MASS INDEX DOCD: CPT | Mod: CPTII,S$GLB,, | Performed by: PHYSICIAN ASSISTANT

## 2021-07-16 RX ORDER — FLUOXETINE HYDROCHLORIDE 20 MG/1
1 CAPSULE ORAL DAILY
COMMUNITY
Start: 2020-12-30 | End: 2023-10-11 | Stop reason: SDUPTHER

## 2021-07-16 RX ORDER — BENZONATATE 200 MG/1
200 CAPSULE ORAL 3 TIMES DAILY PRN
Qty: 30 CAPSULE | Refills: 0 | Status: SHIPPED | OUTPATIENT
Start: 2021-07-16 | End: 2021-07-26

## 2021-10-04 ENCOUNTER — PATIENT MESSAGE (OUTPATIENT)
Dept: ADMINISTRATIVE | Facility: HOSPITAL | Age: 56
End: 2021-10-04

## 2021-12-06 ENCOUNTER — CLINICAL SUPPORT (OUTPATIENT)
Dept: OTHER | Facility: CLINIC | Age: 56
End: 2021-12-06
Payer: COMMERCIAL

## 2021-12-06 DIAGNOSIS — Z00.8 ENCOUNTER FOR OTHER GENERAL EXAMINATION: ICD-10-CM

## 2021-12-07 VITALS — BODY MASS INDEX: 24.19 KG/M2 | HEIGHT: 61 IN

## 2021-12-07 LAB
GLUCOSE SERPL-MCNC: 116 MG/DL (ref 60–140)
HDLC SERPL-MCNC: 58 MG/DL
POC CHOLESTEROL, LDL (DOCK): 130 MG/DL
POC CHOLESTEROL, TOTAL: 216 MG/DL
TRIGL SERPL-MCNC: 135 MG/DL

## 2022-01-10 ENCOUNTER — PATIENT MESSAGE (OUTPATIENT)
Dept: ADMINISTRATIVE | Facility: HOSPITAL | Age: 57
End: 2022-01-10
Payer: COMMERCIAL

## 2022-03-14 ENCOUNTER — LAB VISIT (OUTPATIENT)
Dept: LAB | Facility: HOSPITAL | Age: 57
End: 2022-03-14
Attending: FAMILY MEDICINE
Payer: COMMERCIAL

## 2022-03-14 ENCOUNTER — OFFICE VISIT (OUTPATIENT)
Dept: FAMILY MEDICINE | Facility: CLINIC | Age: 57
End: 2022-03-14
Payer: COMMERCIAL

## 2022-03-14 VITALS
DIASTOLIC BLOOD PRESSURE: 72 MMHG | OXYGEN SATURATION: 98 % | TEMPERATURE: 98 F | HEART RATE: 63 BPM | SYSTOLIC BLOOD PRESSURE: 100 MMHG | WEIGHT: 129.44 LBS | BODY MASS INDEX: 24.45 KG/M2

## 2022-03-14 DIAGNOSIS — Z00.00 ROUTINE GENERAL MEDICAL EXAMINATION AT A HEALTH CARE FACILITY: ICD-10-CM

## 2022-03-14 DIAGNOSIS — F32.A DEPRESSION, UNSPECIFIED DEPRESSION TYPE: ICD-10-CM

## 2022-03-14 DIAGNOSIS — J30.9 ALLERGIC RHINITIS, UNSPECIFIED SEASONALITY, UNSPECIFIED TRIGGER: ICD-10-CM

## 2022-03-14 DIAGNOSIS — Z00.00 ROUTINE GENERAL MEDICAL EXAMINATION AT A HEALTH CARE FACILITY: Primary | ICD-10-CM

## 2022-03-14 LAB
ALBUMIN SERPL BCP-MCNC: 3.9 G/DL (ref 3.5–5.2)
ALP SERPL-CCNC: 65 U/L (ref 55–135)
ALT SERPL W/O P-5'-P-CCNC: 56 U/L (ref 10–44)
ANION GAP SERPL CALC-SCNC: 10 MMOL/L (ref 8–16)
AST SERPL-CCNC: 32 U/L (ref 10–40)
BASOPHILS # BLD AUTO: 0.03 K/UL (ref 0–0.2)
BASOPHILS NFR BLD: 0.7 % (ref 0–1.9)
BILIRUB SERPL-MCNC: 0.6 MG/DL (ref 0.1–1)
BUN SERPL-MCNC: 12 MG/DL (ref 6–20)
CALCIUM SERPL-MCNC: 9.3 MG/DL (ref 8.7–10.5)
CHLORIDE SERPL-SCNC: 103 MMOL/L (ref 95–110)
CHOLEST SERPL-MCNC: 175 MG/DL (ref 120–199)
CHOLEST/HDLC SERPL: 3.8 {RATIO} (ref 2–5)
CO2 SERPL-SCNC: 27 MMOL/L (ref 23–29)
CREAT SERPL-MCNC: 0.7 MG/DL (ref 0.5–1.4)
DIFFERENTIAL METHOD: NORMAL
EOSINOPHIL # BLD AUTO: 0.1 K/UL (ref 0–0.5)
EOSINOPHIL NFR BLD: 1.7 % (ref 0–8)
ERYTHROCYTE [DISTWIDTH] IN BLOOD BY AUTOMATED COUNT: 13.4 % (ref 11.5–14.5)
EST. GFR  (AFRICAN AMERICAN): >60 ML/MIN/1.73 M^2
EST. GFR  (NON AFRICAN AMERICAN): >60 ML/MIN/1.73 M^2
GLUCOSE SERPL-MCNC: 76 MG/DL (ref 70–110)
HCT VFR BLD AUTO: 39.9 % (ref 37–48.5)
HDLC SERPL-MCNC: 46 MG/DL (ref 40–75)
HDLC SERPL: 26.3 % (ref 20–50)
HGB BLD-MCNC: 13.2 G/DL (ref 12–16)
IMM GRANULOCYTES # BLD AUTO: 0.01 K/UL (ref 0–0.04)
IMM GRANULOCYTES NFR BLD AUTO: 0.2 % (ref 0–0.5)
LDLC SERPL CALC-MCNC: 110.4 MG/DL (ref 63–159)
LYMPHOCYTES # BLD AUTO: 2 K/UL (ref 1–4.8)
LYMPHOCYTES NFR BLD: 46.9 % (ref 18–48)
MCH RBC QN AUTO: 30.5 PG (ref 27–31)
MCHC RBC AUTO-ENTMCNC: 33.1 G/DL (ref 32–36)
MCV RBC AUTO: 92 FL (ref 82–98)
MONOCYTES # BLD AUTO: 0.3 K/UL (ref 0.3–1)
MONOCYTES NFR BLD: 7.2 % (ref 4–15)
NEUTROPHILS # BLD AUTO: 1.8 K/UL (ref 1.8–7.7)
NEUTROPHILS NFR BLD: 43.3 % (ref 38–73)
NONHDLC SERPL-MCNC: 129 MG/DL
NRBC BLD-RTO: 0 /100 WBC
PLATELET # BLD AUTO: 255 K/UL (ref 150–450)
PMV BLD AUTO: 9.9 FL (ref 9.2–12.9)
POTASSIUM SERPL-SCNC: 3.4 MMOL/L (ref 3.5–5.1)
PROT SERPL-MCNC: 7.1 G/DL (ref 6–8.4)
RBC # BLD AUTO: 4.33 M/UL (ref 4–5.4)
SODIUM SERPL-SCNC: 140 MMOL/L (ref 136–145)
TRIGL SERPL-MCNC: 93 MG/DL (ref 30–150)
TSH SERPL DL<=0.005 MIU/L-ACNC: 1.99 UIU/ML (ref 0.4–4)
WBC # BLD AUTO: 4.16 K/UL (ref 3.9–12.7)

## 2022-03-14 PROCEDURE — 99396 PR PREVENTIVE VISIT,EST,40-64: ICD-10-PCS | Mod: S$GLB,,, | Performed by: FAMILY MEDICINE

## 2022-03-14 PROCEDURE — 3078F DIAST BP <80 MM HG: CPT | Mod: CPTII,S$GLB,, | Performed by: FAMILY MEDICINE

## 2022-03-14 PROCEDURE — 3008F BODY MASS INDEX DOCD: CPT | Mod: CPTII,S$GLB,, | Performed by: FAMILY MEDICINE

## 2022-03-14 PROCEDURE — 99999 PR PBB SHADOW E&M-EST. PATIENT-LVL III: ICD-10-PCS | Mod: PBBFAC,,, | Performed by: FAMILY MEDICINE

## 2022-03-14 PROCEDURE — 3008F PR BODY MASS INDEX (BMI) DOCUMENTED: ICD-10-PCS | Mod: CPTII,S$GLB,, | Performed by: FAMILY MEDICINE

## 2022-03-14 PROCEDURE — 3074F SYST BP LT 130 MM HG: CPT | Mod: CPTII,S$GLB,, | Performed by: FAMILY MEDICINE

## 2022-03-14 PROCEDURE — 85025 COMPLETE CBC W/AUTO DIFF WBC: CPT | Performed by: FAMILY MEDICINE

## 2022-03-14 PROCEDURE — 3078F PR MOST RECENT DIASTOLIC BLOOD PRESSURE < 80 MM HG: ICD-10-PCS | Mod: CPTII,S$GLB,, | Performed by: FAMILY MEDICINE

## 2022-03-14 PROCEDURE — 80053 COMPREHEN METABOLIC PANEL: CPT | Performed by: FAMILY MEDICINE

## 2022-03-14 PROCEDURE — 80061 LIPID PANEL: CPT | Performed by: FAMILY MEDICINE

## 2022-03-14 PROCEDURE — 1159F PR MEDICATION LIST DOCUMENTED IN MEDICAL RECORD: ICD-10-PCS | Mod: CPTII,S$GLB,, | Performed by: FAMILY MEDICINE

## 2022-03-14 PROCEDURE — 1159F MED LIST DOCD IN RCRD: CPT | Mod: CPTII,S$GLB,, | Performed by: FAMILY MEDICINE

## 2022-03-14 PROCEDURE — 36415 COLL VENOUS BLD VENIPUNCTURE: CPT | Performed by: FAMILY MEDICINE

## 2022-03-14 PROCEDURE — 99396 PREV VISIT EST AGE 40-64: CPT | Mod: S$GLB,,, | Performed by: FAMILY MEDICINE

## 2022-03-14 PROCEDURE — 84443 ASSAY THYROID STIM HORMONE: CPT | Performed by: FAMILY MEDICINE

## 2022-03-14 PROCEDURE — 99999 PR PBB SHADOW E&M-EST. PATIENT-LVL III: CPT | Mod: PBBFAC,,, | Performed by: FAMILY MEDICINE

## 2022-03-14 PROCEDURE — 3074F PR MOST RECENT SYSTOLIC BLOOD PRESSURE < 130 MM HG: ICD-10-PCS | Mod: CPTII,S$GLB,, | Performed by: FAMILY MEDICINE

## 2022-03-14 RX ORDER — FLUTICASONE PROPIONATE 50 MCG
SPRAY, SUSPENSION (ML) NASAL
Qty: 16 G | Refills: 11 | Status: SHIPPED | OUTPATIENT
Start: 2022-03-14 | End: 2023-06-26 | Stop reason: SDUPTHER

## 2022-03-14 NOTE — PROGRESS NOTES
(Portions of this note were dictated using voice recognition software and may contain dictation related errors in spelling/grammar/syntax not found on text review)    CC:   Chief Complaint   Patient presents with    Annual Exam       HPI: 57 y.o. female Last seen by me in    .  Here for annual exam    History of depression, was on Zoloft and Wellbutrin in the past but currently on fluoxetine 20 mg daily (from Dr. Leola MEREDITH:).  Mood is well controlled     Occasional Flonase for allergies needs refill.  Was dealing with some sinus issues over the past week.  A little bit of an upset stomach over the weekend.  No fevers or chills does feel like she these.    Was on Climara patch for HRT emergently but not currently using , does have Estrace cream that she uses.    Diet fairly healthy    No specific exercise        Past Medical History:   Diagnosis Date    Depression        Past Surgical History:   Procedure Laterality Date     SECTION, LOW TRANSVERSE      COLONOSCOPY N/A 2018    Procedure: COLONOSCOPY Clenpiq;  Surgeon: Maria Alejandra Laguna MD;  Location: [facility];  Service: Endoscopy;  Laterality: N/A;       Family History   Problem Relation Age of Onset    Diabetes Father     Hyperlipidemia Father     Hypertension Father     Stroke Father     Coronary artery disease Paternal Grandfather     Melanoma Neg Hx     Psoriasis Neg Hx     Lupus Neg Hx        Social History     Tobacco Use    Smoking status: Never Smoker    Smokeless tobacco: Never Used   Substance Use Topics    Alcohol use: No    Drug use: No       Lab Results   Component Value Date    WBC 4.93 2020    HGB 13.9 2020    HCT 42.3 2020    MCV 94 2020     2020    CHOL 204 (H) 2020    TRIG 95 2020    HDL 67 2020    ALT 27 2020    AST 28 2020    BILITOT 0.4 2020    ALKPHOS 78 2020     2020    K 4.1 2020     2020     CREATININE 0.7 09/28/2020    ESTGFRAFRICA >60 09/28/2020    EGFRNONAA >60 09/28/2020    CALCIUM 9.5 09/28/2020    ALBUMIN 4.1 09/28/2020    BUN 13 09/28/2020    CO2 26 09/28/2020    TSH 2.206 09/28/2020    LDLCALC 118.0 09/28/2020    GLU 83 09/28/2020             Vital signs reviewed  PE:   APPEARANCE: Well nourished, well developed, in no acute distress.    HEAD: Normocephalic, atraumatic.  No sinus tenderness  EYES: PERRL. EOMI.   Conjunctivae noninjected.  EARS: TM's intact. Light reflex normal. No retraction or perforation  MOUTH/THROAT:  Mild pharyngeal erythema with cobblestoning.  No exudate  NECK: Supple with no cervical lymphadenopathy.  No carotid bruits.  No thyromegaly  CHEST: Good inspiratory effort. Lungs clear to auscultation with no wheezes or crackles.  CARDIOVASCULAR: Normal S1, S2. No rubs, murmurs, or gallops.  ABDOMEN: Bowel sounds normal. Not distended. Soft. No tenderness or masses. No organomegaly.  EXTREMITIES: No edema, cyanosis, or clubbing.      IMPRESSION  1. Routine general medical examination at a health care facility    2. Allergic rhinitis, unspecified seasonality, unspecified trigger    3. Depression, unspecified depression type            PLAN  Screening labs below.       Mood well controlled on fluoxetine from her gyn    Flonase as needed, refilled     Orders Placed This Encounter   Procedures    CBC Auto Differential    Comprehensive Metabolic Panel    Lipid Panel    TSH         SCREENINGS  Immunizations  tdap 2018  Flu:  utd  CoVID 02/26/2021, 03/19/2021, 12/21/21  Zoster up-to-date    Pap: Dr. Bhagat  , gyn, she will get records of her Pap smear    Mmg:  Up-to-date through her gyn.  She will get records of her most recent mammogram    Colon screen:  Colonoscopy 2018, internal hemorrhoids, diverticulosis left side of the colon.  1 diminutive polyp splenic flexure, recall 10 years

## 2022-03-17 ENCOUNTER — PATIENT MESSAGE (OUTPATIENT)
Dept: FAMILY MEDICINE | Facility: CLINIC | Age: 57
End: 2022-03-17
Payer: COMMERCIAL

## 2022-03-17 DIAGNOSIS — E87.6 LOW BLOOD POTASSIUM: ICD-10-CM

## 2022-03-17 DIAGNOSIS — R74.8 ELEVATED LIVER ENZYMES: Primary | ICD-10-CM

## 2022-03-17 NOTE — TELEPHONE ENCOUNTER
Needs repeat lab in 3 months    Orders Placed This Encounter   Procedures    Comprehensive Metabolic Panel     Encounter Diagnoses   Name Primary?    Elevated liver enzymes Yes    Low blood potassium

## 2022-03-21 ENCOUNTER — TELEPHONE (OUTPATIENT)
Dept: BARIATRICS | Facility: CLINIC | Age: 57
End: 2022-03-21
Payer: COMMERCIAL

## 2022-03-21 NOTE — TELEPHONE ENCOUNTER
----- Message from Jaleesa Miranda MD sent at 3/21/2022  8:55 AM CDT -----  Pt scheduled herself for 330 today. BMI is 24, which is normal/healthy. Please call pt and cancel appt.   Thank you,  Dr. Miranda

## 2022-03-21 NOTE — TELEPHONE ENCOUNTER
Contacted the patient. Patient was informed that her appointment would need to be cancelled due to her not meeting requirements. Patient was asked what she was needed a dietician. Patient was given the number to dietician to be seen.  Patient verbalized understanding.

## 2022-03-29 ENCOUNTER — PATIENT MESSAGE (OUTPATIENT)
Dept: FAMILY MEDICINE | Facility: CLINIC | Age: 57
End: 2022-03-29
Payer: COMMERCIAL

## 2022-03-29 NOTE — TELEPHONE ENCOUNTER
Patient would like additional information on labs.  I messaged her and addressed her insurance question.

## 2022-05-31 ENCOUNTER — PATIENT MESSAGE (OUTPATIENT)
Dept: ADMINISTRATIVE | Facility: HOSPITAL | Age: 57
End: 2022-05-31
Payer: COMMERCIAL

## 2022-06-15 ENCOUNTER — LAB VISIT (OUTPATIENT)
Dept: LAB | Facility: HOSPITAL | Age: 57
End: 2022-06-15
Attending: FAMILY MEDICINE
Payer: COMMERCIAL

## 2022-06-15 DIAGNOSIS — E87.6 LOW BLOOD POTASSIUM: ICD-10-CM

## 2022-06-15 DIAGNOSIS — R74.8 ELEVATED LIVER ENZYMES: ICD-10-CM

## 2022-06-15 LAB
ALBUMIN SERPL BCP-MCNC: 3.9 G/DL (ref 3.5–5.2)
ALP SERPL-CCNC: 66 U/L (ref 55–135)
ALT SERPL W/O P-5'-P-CCNC: 28 U/L (ref 10–44)
ANION GAP SERPL CALC-SCNC: 6 MMOL/L (ref 8–16)
AST SERPL-CCNC: 22 U/L (ref 10–40)
BILIRUB SERPL-MCNC: 0.6 MG/DL (ref 0.1–1)
BUN SERPL-MCNC: 11 MG/DL (ref 6–20)
CALCIUM SERPL-MCNC: 9.4 MG/DL (ref 8.7–10.5)
CHLORIDE SERPL-SCNC: 105 MMOL/L (ref 95–110)
CO2 SERPL-SCNC: 32 MMOL/L (ref 23–29)
CREAT SERPL-MCNC: 0.8 MG/DL (ref 0.5–1.4)
EST. GFR  (AFRICAN AMERICAN): >60 ML/MIN/1.73 M^2
EST. GFR  (NON AFRICAN AMERICAN): >60 ML/MIN/1.73 M^2
GLUCOSE SERPL-MCNC: 87 MG/DL (ref 70–110)
POTASSIUM SERPL-SCNC: 4.2 MMOL/L (ref 3.5–5.1)
PROT SERPL-MCNC: 6.9 G/DL (ref 6–8.4)
SODIUM SERPL-SCNC: 143 MMOL/L (ref 136–145)

## 2022-06-15 PROCEDURE — 36415 COLL VENOUS BLD VENIPUNCTURE: CPT | Performed by: FAMILY MEDICINE

## 2022-06-15 PROCEDURE — 80053 COMPREHEN METABOLIC PANEL: CPT | Performed by: FAMILY MEDICINE

## 2022-08-24 ENCOUNTER — PATIENT MESSAGE (OUTPATIENT)
Dept: ADMINISTRATIVE | Facility: HOSPITAL | Age: 57
End: 2022-08-24
Payer: COMMERCIAL

## 2022-09-07 ENCOUNTER — CLINICAL SUPPORT (OUTPATIENT)
Dept: OTHER | Facility: CLINIC | Age: 57
End: 2022-09-07
Payer: COMMERCIAL

## 2022-09-07 DIAGNOSIS — Z00.8 ENCOUNTER FOR OTHER GENERAL EXAMINATION: ICD-10-CM

## 2022-09-08 VITALS
SYSTOLIC BLOOD PRESSURE: 110 MMHG | HEIGHT: 61 IN | WEIGHT: 132 LBS | DIASTOLIC BLOOD PRESSURE: 64 MMHG | BODY MASS INDEX: 24.92 KG/M2

## 2022-09-08 LAB
GLUCOSE SERPL-MCNC: 83 MG/DL (ref 60–140)
HDLC SERPL-MCNC: 57 MG/DL
POC CHOLESTEROL, LDL (DOCK): 166 MG/DL
POC CHOLESTEROL, TOTAL: 235 MG/DL
TRIGL SERPL-MCNC: 71 MG/DL

## 2022-10-10 ENCOUNTER — PATIENT MESSAGE (OUTPATIENT)
Dept: ADMINISTRATIVE | Facility: HOSPITAL | Age: 57
End: 2022-10-10
Payer: COMMERCIAL

## 2023-01-17 ENCOUNTER — PATIENT MESSAGE (OUTPATIENT)
Dept: ADMINISTRATIVE | Facility: HOSPITAL | Age: 58
End: 2023-01-17
Payer: COMMERCIAL

## 2023-02-12 ENCOUNTER — PATIENT MESSAGE (OUTPATIENT)
Dept: FAMILY MEDICINE | Facility: CLINIC | Age: 58
End: 2023-02-12
Payer: COMMERCIAL

## 2023-02-17 ENCOUNTER — PATIENT MESSAGE (OUTPATIENT)
Dept: FAMILY MEDICINE | Facility: CLINIC | Age: 58
End: 2023-02-17
Payer: COMMERCIAL

## 2023-03-06 ENCOUNTER — PATIENT MESSAGE (OUTPATIENT)
Dept: FAMILY MEDICINE | Facility: CLINIC | Age: 58
End: 2023-03-06
Payer: COMMERCIAL

## 2023-03-06 DIAGNOSIS — Z71.3 WEIGHT LOSS COUNSELING, ENCOUNTER FOR: Primary | ICD-10-CM

## 2023-03-07 RX ORDER — TERBINAFINE HYDROCHLORIDE 250 MG/1
250 TABLET ORAL DAILY
Qty: 90 TABLET | Refills: 0 | Status: SHIPPED | OUTPATIENT
Start: 2023-03-07 | End: 2023-04-06

## 2023-04-14 ENCOUNTER — TELEPHONE (OUTPATIENT)
Dept: BARIATRICS | Facility: CLINIC | Age: 58
End: 2023-04-14
Payer: COMMERCIAL

## 2023-04-14 NOTE — TELEPHONE ENCOUNTER
Notified patient of the date & time of financial phone call appt.  Pt aware appt is a telephone call.    Dashboard updated  Appt. 04/17/2023  Patient said she had already been contacted .

## 2023-04-20 ENCOUNTER — TELEPHONE (OUTPATIENT)
Dept: BARIATRICS | Facility: CLINIC | Age: 58
End: 2023-04-20
Payer: COMMERCIAL

## 2023-05-05 ENCOUNTER — OFFICE VISIT (OUTPATIENT)
Dept: URGENT CARE | Facility: CLINIC | Age: 58
End: 2023-05-05
Payer: COMMERCIAL

## 2023-05-05 VITALS
OXYGEN SATURATION: 95 % | WEIGHT: 125 LBS | RESPIRATION RATE: 16 BRPM | SYSTOLIC BLOOD PRESSURE: 119 MMHG | HEIGHT: 61 IN | BODY MASS INDEX: 23.6 KG/M2 | HEART RATE: 74 BPM | TEMPERATURE: 98 F | DIASTOLIC BLOOD PRESSURE: 60 MMHG

## 2023-05-05 DIAGNOSIS — J06.9 UPPER RESPIRATORY TRACT INFECTION, UNSPECIFIED TYPE: ICD-10-CM

## 2023-05-05 DIAGNOSIS — J02.9 SORE THROAT: Primary | ICD-10-CM

## 2023-05-05 DIAGNOSIS — R09.81 NASAL CONGESTION: ICD-10-CM

## 2023-05-05 DIAGNOSIS — R09.81 SINUS CONGESTION: ICD-10-CM

## 2023-05-05 LAB
CTP QC/QA: YES
CTP QC/QA: YES
MOLECULAR STREP A: NEGATIVE
SARS-COV-2 AG RESP QL IA.RAPID: NEGATIVE

## 2023-05-05 PROCEDURE — 99214 PR OFFICE/OUTPT VISIT, EST, LEVL IV, 30-39 MIN: ICD-10-PCS | Mod: S$GLB,,, | Performed by: PHYSICIAN ASSISTANT

## 2023-05-05 PROCEDURE — 87811 SARS-COV-2 COVID19 W/OPTIC: CPT | Mod: QW,S$GLB,, | Performed by: PHYSICIAN ASSISTANT

## 2023-05-05 PROCEDURE — 87811 SARS CORONAVIRUS 2 ANTIGEN POCT, MANUAL READ: ICD-10-PCS | Mod: QW,S$GLB,, | Performed by: PHYSICIAN ASSISTANT

## 2023-05-05 PROCEDURE — 99214 OFFICE O/P EST MOD 30 MIN: CPT | Mod: S$GLB,,, | Performed by: PHYSICIAN ASSISTANT

## 2023-05-05 PROCEDURE — 87651 STREP A DNA AMP PROBE: CPT | Mod: QW,S$GLB,, | Performed by: PHYSICIAN ASSISTANT

## 2023-05-05 PROCEDURE — 87651 POCT STREP A MOLECULAR: ICD-10-PCS | Mod: QW,S$GLB,, | Performed by: PHYSICIAN ASSISTANT

## 2023-05-05 RX ORDER — PREDNISONE 10 MG/1
10 TABLET ORAL DAILY
Qty: 4 TABLET | Refills: 0 | Status: SHIPPED | OUTPATIENT
Start: 2023-05-05 | End: 2023-06-26

## 2023-05-05 RX ORDER — FLUOXETINE HYDROCHLORIDE 20 MG/1
20 CAPSULE ORAL
COMMUNITY
Start: 2022-07-01 | End: 2023-07-17

## 2023-05-05 RX ORDER — FLUTICASONE PROPIONATE 50 MCG
1 SPRAY, SUSPENSION (ML) NASAL DAILY
Qty: 16 G | Refills: 3 | Status: SHIPPED | OUTPATIENT
Start: 2023-05-05

## 2023-05-05 NOTE — PATIENT INSTRUCTIONS
Patient Education       Ear Infection ED   General Information   You came to the Emergency Department (ED) for an ear infection. An ear infection can cause ear pain and fever. You might also have trouble hearing from fluid buildup in the middle ear behind the eardrum.  Most ear infections are caused by viruses, but some are caused by bacteria. The doctor will wait to see if you get better on your own if they think the cause is a virus. The doctor will order antibiotic if they think the cause is a bacteria. Antibiotics kill bacteria, but they do not work on viruses.  If the doctor orders antibiotics, be sure to take all of them, even if you start to feel better.  What care is needed at home?   Call your regular doctor to let them know you were in the ED. Make a follow-up appointment if you were told to.  Do not put anything in your ear unless it was ordered by the doctor.  You may want to take medicines like ibuprofen, naproxen, or acetaminophen to help with pain.  When do I need to call the doctor?   Your symptoms are not getting better in 2 to 3 days.  You continue to have problems hearing after 2 to 3 weeks.  You have a fever of 100.4°F (38°C) or higher or chills.  You have discharge or fluid coming from your ear.  You have new or worsening symptoms.  Last Reviewed Date   2020-12-16  Consumer Information Use and Disclaimer   This information is not specific medical advice and does not replace information you receive from your health care provider. This is only a brief summary of general information. It does NOT include all information about conditions, illnesses, injuries, tests, procedures, treatments, therapies, discharge instructions or life-style choices that may apply to you. You must talk with your health care provider for complete information about your health and treatment options. This information should not be used to decide whether or not to accept your health care providers advice, instructions or  recommendations. Only your health care provider has the knowledge and training to provide advice that is right for you.  Copyright   Copyright © 2021 [employer] and its affiliates and/or licensors. All rights reserved.

## 2023-05-05 NOTE — PROGRESS NOTES
"Subjective:      Patient ID: Lakia Wright is a 58 y.o. female.    Vitals:  height is 5' 1" (1.549 m) and weight is 56.7 kg (125 lb). Her oral temperature is 98.3 °F (36.8 °C). Her blood pressure is 119/60 and her pulse is 74. Her respiration is 16 and oxygen saturation is 95%.     Chief Complaint: Sore Throat    Patient presents with nasal congestion for 4 days . Sore throat for 2 days . Patient denies fever but does complain of sinus pain and pressure . Patient reports being taking allegra with no relief.     Sore Throat   This is a new problem. Episode onset: 2 days ago. The problem has been unchanged. There has been no fever. The pain is at a severity of 4/10. The pain is moderate. Associated symptoms include congestion and a hoarse voice. Pertinent negatives include no diarrhea, drooling, plugged ear sensation or trouble swallowing. Treatments tried: allegra. The treatment provided no relief.     HENT:  Positive for congestion, sore throat and voice change. Negative for drooling and trouble swallowing.    Gastrointestinal:  Negative for diarrhea.   Skin:  Negative for erythema.    Objective:     Physical Exam   Constitutional: She is oriented to person, place, and time. She appears well-developed. She is cooperative.  Non-toxic appearance. She does not appear ill. No distress.   HENT:   Head: Normocephalic and atraumatic.   Ears:   Right Ear: Hearing, tympanic membrane, external ear and ear canal normal.   Left Ear: Hearing, tympanic membrane, external ear and ear canal normal.   Nose: Nose normal. No mucosal edema, rhinorrhea, nasal deformity or congestion. No epistaxis. Right sinus exhibits no maxillary sinus tenderness and no frontal sinus tenderness. Left sinus exhibits no maxillary sinus tenderness and no frontal sinus tenderness.   Mouth/Throat: Uvula is midline and mucous membranes are normal. No trismus in the jaw. Normal dentition. No uvula swelling. Posterior oropharyngeal erythema present. No " oropharyngeal exudate or posterior oropharyngeal edema.   Eyes: Conjunctivae, EOM and lids are normal. Pupils are equal, round, and reactive to light. Right eye exhibits no discharge. Left eye exhibits no discharge. No scleral icterus.   Neck: Trachea normal and phonation normal. Neck supple. No JVD present. No tracheal deviation present. No thyromegaly present. No edema present. No erythema present. No neck rigidity present.   Cardiovascular: Normal rate, regular rhythm, normal heart sounds and normal pulses.   No murmur heard.Exam reveals no gallop and no friction rub.   Pulmonary/Chest: Effort normal and breath sounds normal. No stridor. No respiratory distress. She has no decreased breath sounds. She has no wheezes. She has no rhonchi. She has no rales. She exhibits no tenderness.   Abdominal: Normal appearance. She exhibits no distension. Soft. There is no abdominal tenderness. There is no rebound and no guarding.   Musculoskeletal: Normal range of motion.         General: No deformity. Normal range of motion.   Neurological: She is alert and oriented to person, place, and time. She exhibits normal muscle tone. Coordination normal.   Skin: Skin is warm, dry, intact, not diaphoretic, not pale and no rash. Capillary refill takes less than 2 seconds. No erythema   Psychiatric: Her speech is normal and behavior is normal. Judgment and thought content normal.   Nursing note and vitals reviewed.  Results for orders placed or performed in visit on 05/05/23   POCT Strep A, Molecular   Result Value Ref Range    Molecular Strep A, POC Negative Negative     Acceptable Yes    SARS Coronavirus 2 Antigen, POCT Manual Read   Result Value Ref Range    SARS Coronavirus 2 Antigen Negative Negative     Acceptable Yes     No results found.   Assessment:     1. Sore throat    2. Nasal congestion    3. Sinus congestion    4. Upper respiratory tract infection, unspecified type        Plan:       Sore  throat  -     POCT Strep A, Molecular  -     SARS Coronavirus 2 Antigen, POCT Manual Read    Nasal congestion  -     Cancel: SARS Coronavirus 2 Antigen, POCT Manual Read  -     Cancel: POCT COVID-19 Rapid Screening  -     fluticasone propionate (FLONASE) 50 mcg/actuation nasal spray; 1 spray (50 mcg total) by Each Nostril route once daily.  Dispense: 16 g; Refill: 3  -     predniSONE (DELTASONE) 10 MG tablet; Take 1 tablet (10 mg total) by mouth once daily.  Dispense: 4 tablet; Refill: 0    Sinus congestion  -     fluticasone propionate (FLONASE) 50 mcg/actuation nasal spray; 1 spray (50 mcg total) by Each Nostril route once daily.  Dispense: 16 g; Refill: 3  -     predniSONE (DELTASONE) 10 MG tablet; Take 1 tablet (10 mg total) by mouth once daily.  Dispense: 4 tablet; Refill: 0    Upper respiratory tract infection, unspecified type      Follow up if symptoms worsen or fail to improve, for F/U with PCP or ED.   Patient Instructions   Patient Education       Ear Infection ED   General Information   You came to the Emergency Department (ED) for an ear infection. An ear infection can cause ear pain and fever. You might also have trouble hearing from fluid buildup in the middle ear behind the eardrum.  Most ear infections are caused by viruses, but some are caused by bacteria. The doctor will wait to see if you get better on your own if they think the cause is a virus. The doctor will order antibiotic if they think the cause is a bacteria. Antibiotics kill bacteria, but they do not work on viruses.  If the doctor orders antibiotics, be sure to take all of them, even if you start to feel better.  What care is needed at home?   Call your regular doctor to let them know you were in the ED. Make a follow-up appointment if you were told to.  Do not put anything in your ear unless it was ordered by the doctor.  You may want to take medicines like ibuprofen, naproxen, or acetaminophen to help with pain.  When do I need to  call the doctor?   Your symptoms are not getting better in 2 to 3 days.  You continue to have problems hearing after 2 to 3 weeks.  You have a fever of 100.4°F (38°C) or higher or chills.  You have discharge or fluid coming from your ear.  You have new or worsening symptoms.  Last Reviewed Date   2020-12-16  Consumer Information Use and Disclaimer   This information is not specific medical advice and does not replace information you receive from your health care provider. This is only a brief summary of general information. It does NOT include all information about conditions, illnesses, injuries, tests, procedures, treatments, therapies, discharge instructions or life-style choices that may apply to you. You must talk with your health care provider for complete information about your health and treatment options. This information should not be used to decide whether or not to accept your health care providers advice, instructions or recommendations. Only your health care provider has the knowledge and training to provide advice that is right for you.  Copyright   Copyright © 2021 [employer] and its affiliates and/or licensors. All rights reserved.

## 2023-05-08 ENCOUNTER — TELEPHONE (OUTPATIENT)
Dept: URGENT CARE | Facility: CLINIC | Age: 58
End: 2023-05-08
Payer: COMMERCIAL

## 2023-05-23 ENCOUNTER — OFFICE VISIT (OUTPATIENT)
Dept: URGENT CARE | Facility: CLINIC | Age: 58
End: 2023-05-23
Payer: COMMERCIAL

## 2023-05-23 VITALS
BODY MASS INDEX: 24.17 KG/M2 | TEMPERATURE: 98 F | RESPIRATION RATE: 16 BRPM | SYSTOLIC BLOOD PRESSURE: 118 MMHG | HEART RATE: 65 BPM | HEIGHT: 61 IN | DIASTOLIC BLOOD PRESSURE: 56 MMHG | WEIGHT: 128 LBS | OXYGEN SATURATION: 97 %

## 2023-05-23 DIAGNOSIS — J30.9 ALLERGIC RHINITIS, UNSPECIFIED SEASONALITY, UNSPECIFIED TRIGGER: Primary | ICD-10-CM

## 2023-05-23 DIAGNOSIS — R05.9 COUGH, UNSPECIFIED TYPE: ICD-10-CM

## 2023-05-23 LAB
CTP QC/QA: YES
SARS-COV-2 AG RESP QL IA.RAPID: NEGATIVE

## 2023-05-23 PROCEDURE — 87811 SARS-COV-2 COVID19 W/OPTIC: CPT | Mod: QW,S$GLB,, | Performed by: PHYSICIAN ASSISTANT

## 2023-05-23 PROCEDURE — 87811 SARS CORONAVIRUS 2 ANTIGEN POCT, MANUAL READ: ICD-10-PCS | Mod: QW,S$GLB,, | Performed by: PHYSICIAN ASSISTANT

## 2023-05-23 PROCEDURE — 99213 PR OFFICE/OUTPT VISIT, EST, LEVL III, 20-29 MIN: ICD-10-PCS | Mod: S$GLB,,, | Performed by: PHYSICIAN ASSISTANT

## 2023-05-23 PROCEDURE — 99213 OFFICE O/P EST LOW 20 MIN: CPT | Mod: S$GLB,,, | Performed by: PHYSICIAN ASSISTANT

## 2023-05-23 RX ORDER — TERBINAFINE HYDROCHLORIDE 250 MG/1
250 TABLET ORAL DAILY
COMMUNITY
End: 2023-07-17

## 2023-05-23 RX ORDER — BROMPHENIRAMINE MALEATE, PSEUDOEPHEDRINE HYDROCHLORIDE, AND DEXTROMETHORPHAN HYDROBROMIDE 2; 30; 10 MG/5ML; MG/5ML; MG/5ML
10 SYRUP ORAL EVERY 6 HOURS PRN
Qty: 118 ML | Refills: 0 | Status: SHIPPED | OUTPATIENT
Start: 2023-05-23 | End: 2023-06-02

## 2023-05-23 NOTE — PATIENT INSTRUCTIONS
You must understand that you've received an Urgent Care treatment only and that you may be released before all your medical problems are known or treated. You, the patient, will arrange for follow up care as instructed.      Follow up with your PCP or specialty clinic as instructed in the next 2-3 days if not improved or as needed. You can call [phone] to schedule an appointment with appropriate provider.      If you condition worsens, we recommend that you receive another evaluation at the emergency room immediately or contact your primary medical clinic's after hours call service to discuss your concerns.      Please return here or go to the Emergency Department for any concerns or worsening condition.      If you were prescribed a narcotic or controlled substance, do not drive or operate heavy equipment or machinery while taking these medications.

## 2023-05-23 NOTE — PROGRESS NOTES
"Subjective:      Patient ID: Lakia Wright is a 58 y.o. female.    Vitals:  height is 5' 1" (1.549 m) and weight is 58.1 kg (128 lb). Her oral temperature is 97.8 °F (36.6 °C). Her blood pressure is 118/56 (abnormal) and her pulse is 65. Her respiration is 16 and oxygen saturation is 97%.     Chief Complaint: Cough    Pt complains of body aches that started 3 days ago. Her cough started yesterday or the day before. She also have sinus pain, and headaches. Pt denies any fever.     Patient provider note starts here:  Patient reports a 3 day history of body aches, dry cough, sinus pressure, nasal congestion, headaches and sore throat. Reports that she took some prescription cough syrup which seemed to help her cough but she has since run out. Denies fevers, chest pain or SOB. Reports similar symptoms recently as evaluated at this facility and prescribed Prednisone.     Cough  This is a new problem. Episode onset: 3 days ago. The problem has been gradually worsening. The problem occurs every few minutes. The cough is Non-productive. Associated symptoms include headaches, myalgias, postnasal drip and a sore throat. Pertinent negatives include no chest pain, fever, shortness of breath or wheezing. Nothing aggravates the symptoms. Treatments tried: rafael selter cold and flu. The treatment provided mild relief. There is no history of asthma, bronchitis or pneumonia.     Constitution: Positive for fatigue. Negative for fever.   HENT:  Positive for congestion, postnasal drip, sinus pressure and sore throat.    Neck: Negative for neck pain.   Cardiovascular:  Negative for chest pain, palpitations and sob on exertion.   Respiratory:  Positive for cough. Negative for chest tightness, sputum production, shortness of breath and wheezing.    Gastrointestinal:  Negative for abdominal pain, nausea, vomiting and diarrhea.   Musculoskeletal:  Positive for muscle ache.   Skin:  Negative for color change and wound. "   Allergic/Immunologic: Negative for sneezing.   Neurological:  Positive for headaches. Negative for numbness and tingling.    Objective:     Physical Exam   Constitutional: She is oriented to person, place, and time. She appears well-developed. She is cooperative.  Non-toxic appearance. She does not appear ill. No distress.   HENT:   Head: Normocephalic and atraumatic.   Ears:   Right Ear: Hearing, tympanic membrane, external ear and ear canal normal.   Left Ear: Hearing, tympanic membrane, external ear and ear canal normal.   Nose: Congestion present. No mucosal edema, rhinorrhea or nasal deformity. No epistaxis. Right sinus exhibits no maxillary sinus tenderness and no frontal sinus tenderness. Left sinus exhibits no maxillary sinus tenderness and no frontal sinus tenderness.   Mouth/Throat: Uvula is midline and mucous membranes are normal. No trismus in the jaw. Normal dentition. No uvula swelling. Posterior oropharyngeal erythema present. No oropharyngeal exudate or posterior oropharyngeal edema.   Eyes: Conjunctivae and lids are normal. No scleral icterus.   Neck: Trachea normal and phonation normal. Neck supple. No edema present. No erythema present. No neck rigidity present.   Cardiovascular: Normal rate, regular rhythm, normal heart sounds and normal pulses.   Pulmonary/Chest: Effort normal and breath sounds normal. No respiratory distress. She has no decreased breath sounds. She has no wheezes. She has no rhonchi.   Abdominal: Normal appearance.   Musculoskeletal: Normal range of motion.         General: No deformity. Normal range of motion.   Neurological: She is alert and oriented to person, place, and time. She exhibits normal muscle tone. Coordination normal.   Skin: Skin is warm, dry, intact, not diaphoretic and not pale.   Psychiatric: Her speech is normal and behavior is normal. Judgment and thought content normal.   Nursing note and vitals reviewed.    Assessment:     1. Allergic rhinitis,  unspecified seasonality, unspecified trigger    2. Cough, unspecified type      Results for orders placed or performed in visit on 05/23/23   SARS Coronavirus 2 Antigen, POCT Manual Read   Result Value Ref Range    SARS Coronavirus 2 Antigen Negative Negative     Acceptable Yes        Plan:       Allergic rhinitis, unspecified seasonality, unspecified trigger  -     brompheniramine-pseudoeph-DM (BROMFED DM) 2-30-10 mg/5 mL Syrp; Take 10 mLs by mouth every 6 (six) hours as needed (Cough and congestion).  Dispense: 118 mL; Refill: 0    Cough, unspecified type  -     SARS Coronavirus 2 Antigen, POCT Manual Read          Medical Decision Making:   History:   Old Medical Records: I decided to obtain old medical records.  Old Records Summarized: records from clinic visits.  Clinical Tests:   Lab Tests: Ordered and Reviewed  Urgent Care Management:  A. Problem List:   -Acute: allergic rhinitis   -Chronic: Depression  B. Differential diagnosis: viral vs bacterial URI, pharyngitis, otitis, COVID 19, influenza, pneumonia, allergic rhinitis  C. Diagnostic Testing Ordered: COVID  D. Diagnostic Testing Considered: Flu  E. Independent Historians:  F. Urgent Care Midlevel Independent Results Interpretation: COVID negative  G. Radiology:  H. Review of Previous Medical Records: Evaluated in urgent care with pharyngitis on 5/5 and negative COVID and strep at that time and prescribed Prednisone.   I. Home Medications Reviewed  J. Social Determinants of Health: None Applicable  K. Medical Decision Making and Disposition: Patient presents with complaints of nasal congestion, dry cough, sore thrtoat for the past 3-4 days. On exam, she is afebrile and nontoxic appearing.  Lungs are clear to auscultation bilaterally and vital signs are stable.  COVID is negative.  I do not see any bacterial causes at this time.  Patient was treated with Bromfed DM and encouraged to take any antihistamine as well.  Advised close follow-up  with primary care and strict ED precautions.  She verbalized understanding and agreed with plan.         Patient Instructions   You must understand that you've received an Urgent Care treatment only and that you may be released before all your medical problems are known or treated. You, the patient, will arrange for follow up care as instructed.      Follow up with your PCP or specialty clinic as instructed in the next 2-3 days if not improved or as needed. You can call [phone] to schedule an appointment with appropriate provider.      If you condition worsens, we recommend that you receive another evaluation at the emergency room immediately or contact your primary medical clinic's after hours call service to discuss your concerns.      Please return here or go to the Emergency Department for any concerns or worsening condition.      If you were prescribed a narcotic or controlled substance, do not drive or operate heavy equipment or machinery while taking these medications.

## 2023-05-26 ENCOUNTER — TELEPHONE (OUTPATIENT)
Dept: URGENT CARE | Facility: CLINIC | Age: 58
End: 2023-05-26
Payer: COMMERCIAL

## 2023-05-26 DIAGNOSIS — R05.1 ACUTE COUGH: Primary | ICD-10-CM

## 2023-05-26 RX ORDER — PROMETHAZINE HYDROCHLORIDE AND DEXTROMETHORPHAN HYDROBROMIDE 6.25; 15 MG/5ML; MG/5ML
5 SYRUP ORAL EVERY 4 HOURS PRN
Qty: 118 ML | Refills: 0 | Status: SHIPPED | OUTPATIENT
Start: 2023-05-26 | End: 2023-06-05

## 2023-05-26 NOTE — TELEPHONE ENCOUNTER
Returned patient's phone call. She continues to have a cough and is requesting additional cough medicine since she has run out of the Bromfed DM. Denies nasal congestion or sinus pressure so will change to Promathazine DM but advised to be re-evaluated should symptoms persist through the weekend. She verbalized understanding.

## 2023-06-07 ENCOUNTER — PATIENT MESSAGE (OUTPATIENT)
Dept: OBSTETRICS AND GYNECOLOGY | Facility: CLINIC | Age: 58
End: 2023-06-07
Payer: COMMERCIAL

## 2023-06-21 ENCOUNTER — PATIENT MESSAGE (OUTPATIENT)
Dept: OBSTETRICS AND GYNECOLOGY | Facility: CLINIC | Age: 58
End: 2023-06-21
Payer: COMMERCIAL

## 2023-06-26 ENCOUNTER — OFFICE VISIT (OUTPATIENT)
Dept: FAMILY MEDICINE | Facility: CLINIC | Age: 58
End: 2023-06-26
Payer: COMMERCIAL

## 2023-06-26 ENCOUNTER — HOSPITAL ENCOUNTER (OUTPATIENT)
Dept: RADIOLOGY | Facility: HOSPITAL | Age: 58
Discharge: HOME | End: 2023-06-26
Attending: FAMILY MEDICINE
Payer: COMMERCIAL

## 2023-06-26 VITALS
HEART RATE: 69 BPM | OXYGEN SATURATION: 94 % | BODY MASS INDEX: 24.72 KG/M2 | DIASTOLIC BLOOD PRESSURE: 70 MMHG | HEIGHT: 61 IN | SYSTOLIC BLOOD PRESSURE: 106 MMHG | WEIGHT: 130.94 LBS

## 2023-06-26 DIAGNOSIS — R05.9 COUGH, UNSPECIFIED TYPE: ICD-10-CM

## 2023-06-26 DIAGNOSIS — R09.81 SINUS CONGESTION: ICD-10-CM

## 2023-06-26 DIAGNOSIS — L30.1 DYSHIDROTIC ECZEMA: ICD-10-CM

## 2023-06-26 DIAGNOSIS — R09.81 NASAL CONGESTION: ICD-10-CM

## 2023-06-26 DIAGNOSIS — J30.9 ALLERGIC RHINITIS, UNSPECIFIED SEASONALITY, UNSPECIFIED TRIGGER: Primary | ICD-10-CM

## 2023-06-26 PROCEDURE — 1159F PR MEDICATION LIST DOCUMENTED IN MEDICAL RECORD: ICD-10-PCS | Mod: CPTII,S$GLB,, | Performed by: FAMILY MEDICINE

## 2023-06-26 PROCEDURE — 3008F PR BODY MASS INDEX (BMI) DOCUMENTED: ICD-10-PCS | Mod: CPTII,S$GLB,, | Performed by: FAMILY MEDICINE

## 2023-06-26 PROCEDURE — 71046 X-RAY EXAM CHEST 2 VIEWS: CPT | Mod: TC,FY

## 2023-06-26 PROCEDURE — 1160F RVW MEDS BY RX/DR IN RCRD: CPT | Mod: CPTII,S$GLB,, | Performed by: FAMILY MEDICINE

## 2023-06-26 PROCEDURE — 99999 PR PBB SHADOW E&M-EST. PATIENT-LVL IV: CPT | Mod: PBBFAC,,, | Performed by: FAMILY MEDICINE

## 2023-06-26 PROCEDURE — 71046 X-RAY EXAM CHEST 2 VIEWS: CPT | Mod: 26,,, | Performed by: RADIOLOGY

## 2023-06-26 PROCEDURE — 1159F MED LIST DOCD IN RCRD: CPT | Mod: CPTII,S$GLB,, | Performed by: FAMILY MEDICINE

## 2023-06-26 PROCEDURE — 3078F PR MOST RECENT DIASTOLIC BLOOD PRESSURE < 80 MM HG: ICD-10-PCS | Mod: CPTII,S$GLB,, | Performed by: FAMILY MEDICINE

## 2023-06-26 PROCEDURE — 3074F SYST BP LT 130 MM HG: CPT | Mod: CPTII,S$GLB,, | Performed by: FAMILY MEDICINE

## 2023-06-26 PROCEDURE — 71046 XR CHEST PA AND LATERAL: ICD-10-PCS | Mod: 26,,, | Performed by: RADIOLOGY

## 2023-06-26 PROCEDURE — 99214 PR OFFICE/OUTPT VISIT, EST, LEVL IV, 30-39 MIN: ICD-10-PCS | Mod: S$GLB,,, | Performed by: FAMILY MEDICINE

## 2023-06-26 PROCEDURE — 3008F BODY MASS INDEX DOCD: CPT | Mod: CPTII,S$GLB,, | Performed by: FAMILY MEDICINE

## 2023-06-26 PROCEDURE — 99999 PR PBB SHADOW E&M-EST. PATIENT-LVL IV: ICD-10-PCS | Mod: PBBFAC,,, | Performed by: FAMILY MEDICINE

## 2023-06-26 PROCEDURE — 3074F PR MOST RECENT SYSTOLIC BLOOD PRESSURE < 130 MM HG: ICD-10-PCS | Mod: CPTII,S$GLB,, | Performed by: FAMILY MEDICINE

## 2023-06-26 PROCEDURE — 99214 OFFICE O/P EST MOD 30 MIN: CPT | Mod: S$GLB,,, | Performed by: FAMILY MEDICINE

## 2023-06-26 PROCEDURE — 1160F PR REVIEW ALL MEDS BY PRESCRIBER/CLIN PHARMACIST DOCUMENTED: ICD-10-PCS | Mod: CPTII,S$GLB,, | Performed by: FAMILY MEDICINE

## 2023-06-26 PROCEDURE — 3078F DIAST BP <80 MM HG: CPT | Mod: CPTII,S$GLB,, | Performed by: FAMILY MEDICINE

## 2023-06-26 RX ORDER — AZELASTINE 1 MG/ML
2 SPRAY, METERED NASAL 2 TIMES DAILY
Qty: 30 ML | Refills: 1 | Status: SHIPPED | OUTPATIENT
Start: 2023-06-26 | End: 2024-06-25

## 2023-06-26 RX ORDER — TRIAMCINOLONE ACETONIDE 1 MG/G
CREAM TOPICAL 2 TIMES DAILY
Qty: 15 G | Refills: 1 | Status: SHIPPED | OUTPATIENT
Start: 2023-06-26 | End: 2024-03-06 | Stop reason: ALTCHOICE

## 2023-06-26 RX ORDER — FLUTICASONE PROPIONATE 50 MCG
SPRAY, SUSPENSION (ML) NASAL
Qty: 16 G | Refills: 11 | Status: SHIPPED | OUTPATIENT
Start: 2023-06-26 | End: 2023-07-17

## 2023-06-26 NOTE — PROGRESS NOTES
(Portions of this note were dictated using voice recognition software and may contain dictation related errors in spelling/grammar/syntax not found on text review)    CC:   Chief Complaint   Patient presents with    Cough     Since 1 month        HPI: 58 y.o. female      Cough for 1 month.  Sometimes productive of some mucus.  Went to urgent care May 23rd, note reviewed.  Was having cough, sinus pain, headaches, body aches.  COVID negative.  Was given cough syrup with brompheniramine, pseudoephedrine, dextromethorphan.  Does have allergic rhinitis, uses Flonase as needed    Currently does not take any medication.  Was taking Allegra D when she had presented above but did not really feel like it helped.  Denies any shortness of breath but feels like the cough is more wet than usual in coming from her chest.  Denies any chest pain.  No fevers or chills.  Denies any significant stuffy/runny nose but feels a bit of head congestion.  No vomiting, diarrhea, abdominal pain     Also saw her dermatologist recently for general exam and also had a rash on her left thumb, was given some cream for this but was really expensive, was wondering if she can get something that is cheaper.  Hands do get dry.          Past Medical History:   Diagnosis Date    Allergic rhinitis     Depression        Past Surgical History:   Procedure Laterality Date     SECTION, LOW TRANSVERSE      COLONOSCOPY N/A 2018    Procedure: COLONOSCOPY Clenpmaggie;  Surgeon: Maria Alejandra Laguna MD;  Location: [facility];  Service: Endoscopy;  Laterality: N/A;       Family History   Problem Relation Age of Onset    Diabetes Father     Hyperlipidemia Father     Hypertension Father     Stroke Father     Coronary artery disease Paternal Grandfather     Melanoma Neg Hx     Psoriasis Neg Hx     Lupus Neg Hx        Social History     Tobacco Use    Smoking status: Never     Passive exposure: Never    Smokeless tobacco: Never   Substance Use Topics    Alcohol  use: No    Drug use: No       Lab Results   Component Value Date    WBC 4.16 03/14/2022    HGB 13.2 03/14/2022    HCT 39.9 03/14/2022    MCV 92 03/14/2022     03/14/2022    CHOL 175 03/14/2022    TRIG 93 03/14/2022    HDL 46 03/14/2022    ALT 28 06/15/2022    AST 22 06/15/2022    BILITOT 0.6 06/15/2022    ALKPHOS 66 06/15/2022     06/15/2022    K 4.2 06/15/2022     06/15/2022    CREATININE 0.8 06/15/2022    ESTGFRAFRICA >60 06/15/2022    EGFRNONAA >60 06/15/2022    CALCIUM 9.4 06/15/2022    ALBUMIN 3.9 06/15/2022    BUN 11 06/15/2022    CO2 32 (H) 06/15/2022    TSH 1.990 03/14/2022    LDLCALC 110.4 03/14/2022    GLU 87 06/15/2022             Vital signs reviewed  PE:   APPEARANCE: Well nourished, well developed, in no acute distress.  Occasionally does exhibit some wet sending coughing during exam.  HEAD: Normocephalic, atraumatic.  No sinus tenderness    EYES: PERRL. EOMI.   Conjunctivae noninjected.  NOSE:  Nasal turbinate edema bilaterally.  EARS: TM's intact. Light reflex normal. No retraction or perforation.  Slight serous effusion bilaterally.  MOUTH/THROAT:  Mild pharyngeal erythema with cobblestoning.  No exudate  NECK: Supple with no cervical lymphadenopathy.  No carotid bruits.  No thyromegaly  CHEST: Good inspiratory effort. Lungs overall clear although there is some slight mid inspiratory sounds heard right base indicative of possible mucus plug.  CARDIOVASCULAR: Normal S1, S2. No rubs, murmurs, or gallops.  ABDOMEN: Bowel sounds normal. Not distended. Soft. No tenderness or masses. No organomegaly.  EXTREMITIES: No edema, Slight scaling noted ulnar aspect of left thumb with some signs of disc hydro CIS      IMPRESSION  1. Allergic rhinitis, unspecified seasonality, unspecified trigger    2. Nasal congestion    3. Sinus congestion    4. Cough, unspecified type    5. Dyshidrotic eczema              PLAN  Suspect allergic rhinitis/postnasal drip cough although given persistence and  concerned that the cough care doctors different than her usual cough along with some subtle abnormal physical exam findings, will get chest x-ray just to rule out intrapulmonary component.  Will advise Flonase plus Astelin regularly for the next 2 weeks.  Could consider short-term albuterol trial if symptoms persist.  Occasionally does get some reflux issues, can take some OTC acid reflux treatment like tums temporarily or Pepcid AC to see if this helps    Mild dyshidrotic eczema noted, trial of triamcinolone cream 0.1% apply twice a day locally to the left the, along with regular hand moisturization.  She can follow-up with her dermatologist especially for any worsening issues    She will be presenting next month for her wellness exam, can follow-up on her coughing symptom at that time as well     Orders Placed This Encounter   Procedures    X-Ray Chest PA And Lateral     Medications Ordered This Encounter   Medications    azelastine (ASTELIN) 137 mcg (0.1 %) nasal spray     Si sprays (274 mcg total) by Nasal route 2 (two) times daily.     Dispense:  30 mL     Refill:  1    fluticasone propionate (FLONASE) 50 mcg/actuation nasal spray     Sig: USE 2 SPRAY(S) IN EACH NOSTRIL ONCE DAILY AS NEEDED OR  RHINITIS     Dispense:  16 g     Refill:  11     Please consider 90 day supplies to promote better adherence    triamcinolone acetonide 0.1% (KENALOG) 0.1 % cream     Sig: Apply topically 2 (two) times daily. for 10 days     Dispense:  15 g     Refill:  1         SCREENINGS  Immunizations  tdap 2018  CoVID bivalent eligible.  Zoster up-to-date    Pap: Dr. Bhagat  , gyn, she will get records of her Pap smear    Mmg:  Up-to-date through her gyn.  She will get records of her most recent mammogram    Colon screen:  Colonoscopy 2018, internal hemorrhoids, diverticulosis left side of the colon.  1 diminutive polyp splenic flexure, recall 10 years

## 2023-07-06 LAB — BCS RECOMMENDATION EXT: NORMAL

## 2023-07-17 ENCOUNTER — LAB VISIT (OUTPATIENT)
Dept: LAB | Facility: HOSPITAL | Age: 58
End: 2023-07-17
Attending: FAMILY MEDICINE
Payer: COMMERCIAL

## 2023-07-17 ENCOUNTER — OFFICE VISIT (OUTPATIENT)
Dept: FAMILY MEDICINE | Facility: CLINIC | Age: 58
End: 2023-07-17
Payer: COMMERCIAL

## 2023-07-17 VITALS
BODY MASS INDEX: 24.22 KG/M2 | HEIGHT: 61 IN | SYSTOLIC BLOOD PRESSURE: 108 MMHG | OXYGEN SATURATION: 96 % | WEIGHT: 128.31 LBS | TEMPERATURE: 98 F | DIASTOLIC BLOOD PRESSURE: 64 MMHG | HEART RATE: 59 BPM

## 2023-07-17 DIAGNOSIS — Z00.00 ROUTINE GENERAL MEDICAL EXAMINATION AT A HEALTH CARE FACILITY: ICD-10-CM

## 2023-07-17 DIAGNOSIS — F32.A DEPRESSION, UNSPECIFIED DEPRESSION TYPE: ICD-10-CM

## 2023-07-17 DIAGNOSIS — Z12.31 BREAST CANCER SCREENING BY MAMMOGRAM: ICD-10-CM

## 2023-07-17 DIAGNOSIS — Z00.00 ROUTINE GENERAL MEDICAL EXAMINATION AT A HEALTH CARE FACILITY: Primary | ICD-10-CM

## 2023-07-17 LAB
ALBUMIN SERPL BCP-MCNC: 4.2 G/DL (ref 3.5–5.2)
ALP SERPL-CCNC: 66 U/L (ref 55–135)
ALT SERPL W/O P-5'-P-CCNC: 26 U/L (ref 10–44)
ANION GAP SERPL CALC-SCNC: 10 MMOL/L (ref 8–16)
AST SERPL-CCNC: 28 U/L (ref 10–40)
BASOPHILS # BLD AUTO: 0.04 K/UL (ref 0–0.2)
BASOPHILS NFR BLD: 0.9 % (ref 0–1.9)
BILIRUB SERPL-MCNC: 0.6 MG/DL (ref 0.1–1)
BUN SERPL-MCNC: 17 MG/DL (ref 6–20)
CALCIUM SERPL-MCNC: 9.9 MG/DL (ref 8.7–10.5)
CHLORIDE SERPL-SCNC: 105 MMOL/L (ref 95–110)
CHOLEST SERPL-MCNC: 227 MG/DL (ref 120–199)
CHOLEST/HDLC SERPL: 4.1 {RATIO} (ref 2–5)
CO2 SERPL-SCNC: 24 MMOL/L (ref 23–29)
CREAT SERPL-MCNC: 0.8 MG/DL (ref 0.5–1.4)
DIFFERENTIAL METHOD: NORMAL
EOSINOPHIL # BLD AUTO: 0.1 K/UL (ref 0–0.5)
EOSINOPHIL NFR BLD: 1.5 % (ref 0–8)
ERYTHROCYTE [DISTWIDTH] IN BLOOD BY AUTOMATED COUNT: 13.8 % (ref 11.5–14.5)
EST. GFR  (NO RACE VARIABLE): >60 ML/MIN/1.73 M^2
ESTIMATED AVG GLUCOSE: 108 MG/DL (ref 68–131)
GLUCOSE SERPL-MCNC: 77 MG/DL (ref 70–110)
HBA1C MFR BLD: 5.4 % (ref 4–5.6)
HCT VFR BLD AUTO: 44.4 % (ref 37–48.5)
HDLC SERPL-MCNC: 56 MG/DL (ref 40–75)
HDLC SERPL: 24.7 % (ref 20–50)
HGB BLD-MCNC: 14.5 G/DL (ref 12–16)
IMM GRANULOCYTES # BLD AUTO: 0.01 K/UL (ref 0–0.04)
IMM GRANULOCYTES NFR BLD AUTO: 0.2 % (ref 0–0.5)
LDLC SERPL CALC-MCNC: 154.2 MG/DL (ref 63–159)
LYMPHOCYTES # BLD AUTO: 1.6 K/UL (ref 1–4.8)
LYMPHOCYTES NFR BLD: 35 % (ref 18–48)
MCH RBC QN AUTO: 30.1 PG (ref 27–31)
MCHC RBC AUTO-ENTMCNC: 32.7 G/DL (ref 32–36)
MCV RBC AUTO: 92 FL (ref 82–98)
MONOCYTES # BLD AUTO: 0.3 K/UL (ref 0.3–1)
MONOCYTES NFR BLD: 6.8 % (ref 4–15)
NEUTROPHILS # BLD AUTO: 2.6 K/UL (ref 1.8–7.7)
NEUTROPHILS NFR BLD: 55.6 % (ref 38–73)
NONHDLC SERPL-MCNC: 171 MG/DL
NRBC BLD-RTO: 0 /100 WBC
PLATELET # BLD AUTO: 253 K/UL (ref 150–450)
PMV BLD AUTO: 10.5 FL (ref 9.2–12.9)
POTASSIUM SERPL-SCNC: 4.2 MMOL/L (ref 3.5–5.1)
PROT SERPL-MCNC: 7.5 G/DL (ref 6–8.4)
RBC # BLD AUTO: 4.82 M/UL (ref 4–5.4)
SODIUM SERPL-SCNC: 139 MMOL/L (ref 136–145)
TRIGL SERPL-MCNC: 84 MG/DL (ref 30–150)
TSH SERPL DL<=0.005 MIU/L-ACNC: 2.66 UIU/ML (ref 0.4–4)
WBC # BLD AUTO: 4.68 K/UL (ref 3.9–12.7)

## 2023-07-17 PROCEDURE — 1159F MED LIST DOCD IN RCRD: CPT | Mod: CPTII,S$GLB,, | Performed by: FAMILY MEDICINE

## 2023-07-17 PROCEDURE — 99396 PR PREVENTIVE VISIT,EST,40-64: ICD-10-PCS | Mod: S$GLB,,, | Performed by: FAMILY MEDICINE

## 2023-07-17 PROCEDURE — 99396 PREV VISIT EST AGE 40-64: CPT | Mod: S$GLB,,, | Performed by: FAMILY MEDICINE

## 2023-07-17 PROCEDURE — 1159F PR MEDICATION LIST DOCUMENTED IN MEDICAL RECORD: ICD-10-PCS | Mod: CPTII,S$GLB,, | Performed by: FAMILY MEDICINE

## 2023-07-17 PROCEDURE — 3078F PR MOST RECENT DIASTOLIC BLOOD PRESSURE < 80 MM HG: ICD-10-PCS | Mod: CPTII,S$GLB,, | Performed by: FAMILY MEDICINE

## 2023-07-17 PROCEDURE — 80053 COMPREHEN METABOLIC PANEL: CPT | Performed by: FAMILY MEDICINE

## 2023-07-17 PROCEDURE — 85025 COMPLETE CBC W/AUTO DIFF WBC: CPT | Performed by: FAMILY MEDICINE

## 2023-07-17 PROCEDURE — 3008F PR BODY MASS INDEX (BMI) DOCUMENTED: ICD-10-PCS | Mod: CPTII,S$GLB,, | Performed by: FAMILY MEDICINE

## 2023-07-17 PROCEDURE — 3074F SYST BP LT 130 MM HG: CPT | Mod: CPTII,S$GLB,, | Performed by: FAMILY MEDICINE

## 2023-07-17 PROCEDURE — 3074F PR MOST RECENT SYSTOLIC BLOOD PRESSURE < 130 MM HG: ICD-10-PCS | Mod: CPTII,S$GLB,, | Performed by: FAMILY MEDICINE

## 2023-07-17 PROCEDURE — 99999 PR PBB SHADOW E&M-EST. PATIENT-LVL III: CPT | Mod: PBBFAC,,, | Performed by: FAMILY MEDICINE

## 2023-07-17 PROCEDURE — 3008F BODY MASS INDEX DOCD: CPT | Mod: CPTII,S$GLB,, | Performed by: FAMILY MEDICINE

## 2023-07-17 PROCEDURE — 99999 PR PBB SHADOW E&M-EST. PATIENT-LVL III: ICD-10-PCS | Mod: PBBFAC,,, | Performed by: FAMILY MEDICINE

## 2023-07-17 PROCEDURE — 80061 LIPID PANEL: CPT | Performed by: FAMILY MEDICINE

## 2023-07-17 PROCEDURE — 3078F DIAST BP <80 MM HG: CPT | Mod: CPTII,S$GLB,, | Performed by: FAMILY MEDICINE

## 2023-07-17 PROCEDURE — 36415 COLL VENOUS BLD VENIPUNCTURE: CPT | Performed by: FAMILY MEDICINE

## 2023-07-17 PROCEDURE — 83036 HEMOGLOBIN GLYCOSYLATED A1C: CPT | Performed by: FAMILY MEDICINE

## 2023-07-17 PROCEDURE — 84443 ASSAY THYROID STIM HORMONE: CPT | Performed by: FAMILY MEDICINE

## 2023-07-17 NOTE — PROGRESS NOTES
(Portions of this note were dictated using voice recognition software and may contain dictation related errors in spelling/grammar/syntax not found on text review)    CC:   Chief Complaint   Patient presents with    Annual Exam       HPI: 58 y.o. female   .  Here for annual exam    History of depression, was on Zoloft and Wellbutrin in the past but currently on fluoxetine 20 mg daily (from Dr. Leola MEREDITH:).  Mood is well controlled     Occasional Flonase for allergies .  Added Astelin temporarily in 2023 given exacerbation.  She is continuing to take both, symptoms have cleared.    Was on Climara patch for HRT emergently but not currently using , does have Estrace cream that she uses.    Diet fairly healthy    No specific exercise        Past Medical History:   Diagnosis Date    Allergic rhinitis     Depression        Past Surgical History:   Procedure Laterality Date     SECTION, LOW TRANSVERSE      COLONOSCOPY N/A 2018    Procedure: COLONOSCOPY Clenpiq;  Surgeon: Maria Alejandra Laguna MD;  Location: [facility];  Service: Endoscopy;  Laterality: N/A;       Family History   Problem Relation Age of Onset    Diabetes Father     Hyperlipidemia Father     Hypertension Father     Stroke Father     Coronary artery disease Paternal Grandfather     Melanoma Neg Hx     Psoriasis Neg Hx     Lupus Neg Hx        Social History     Tobacco Use    Smoking status: Never     Passive exposure: Never    Smokeless tobacco: Never   Substance Use Topics    Alcohol use: No    Drug use: No       Lab Results   Component Value Date    WBC 4.16 2022    HGB 13.2 2022    HCT 39.9 2022    MCV 92 2022     2022    CHOL 175 2022    TRIG 93 2022    HDL 46 2022    ALT 28 06/15/2022    AST 22 06/15/2022    BILITOT 0.6 06/15/2022    ALKPHOS 66 06/15/2022     06/15/2022    K 4.2 06/15/2022     06/15/2022    CREATININE 0.8 06/15/2022    ESTGFRAFRICA >60 06/15/2022     EGFRNONAA >60 06/15/2022    CALCIUM 9.4 06/15/2022    ALBUMIN 3.9 06/15/2022    BUN 11 06/15/2022    CO2 32 (H) 06/15/2022    TSH 1.990 03/14/2022    LDLCALC 110.4 03/14/2022    GLU 87 06/15/2022             Vital signs reviewed  PE:   APPEARANCE: Well nourished, well developed, in no acute distress.    HEAD: Normocephalic, atraumatic.  No sinus tenderness  EYES: PERRL. EOMI.   Conjunctivae noninjected.  EARS: TM's intact. Light reflex normal. No retraction or perforation  MOUTH/THROAT:  Mild pharyngeal erythema with cobblestoning.  No exudate  NECK: Supple with no cervical lymphadenopathy.  No carotid bruits.  No thyromegaly  CHEST: Good inspiratory effort. Lungs clear to auscultation with no wheezes or crackles.  CARDIOVASCULAR: Normal S1, S2. No rubs, murmurs, or gallops.  ABDOMEN: Bowel sounds normal. Not distended. Soft. No tenderness or masses. No organomegaly.  EXTREMITIES: No edema, cyanosis, or clubbing.      IMPRESSION  1. Routine general medical examination at a health care facility    2. Depression, unspecified depression type    3. Breast cancer screening by mammogram            PLAN  Screening labs below.       Mood well controlled on fluoxetine from her gyn    Flonase , Astelin as needed     Orders Placed This Encounter   Procedures    CBC Auto Differential    Comprehensive Metabolic Panel    Lipid Panel    TSH    Hemoglobin A1C         SCREENINGS  Immunizations  tdap 2018  Flu:  utd  CoVID 02/26/2021, 03/19/2021, 12/21/21, bivalent eligible  Zoster up-to-date    Pap: Dr. Bhagat  , gyn, she will get records of her Pap smear    Mmg:  Up-to-date through her gyn.  She will get records of her most recent mammogram    Colon screen:  Colonoscopy 2018, internal hemorrhoids, diverticulosis left side of the colon.  1 diminutive polyp splenic flexure, recall 10 years

## 2023-07-25 ENCOUNTER — PATIENT OUTREACH (OUTPATIENT)
Dept: ADMINISTRATIVE | Facility: HOSPITAL | Age: 58
End: 2023-07-25
Payer: COMMERCIAL

## 2023-07-25 NOTE — PROGRESS NOTES
Care Everywhere updates requested and reviewed.  Immunizations reconciled. Media reports reviewed.  Duplicate HM overrides and  orders removed.  Overdue HM topic chart audit and/or requested.  Overdue lab testing linked to upcoming lab appointments if applies.       updated with external MAMMO report.       Health Maintenance Due   Topic Date Due    Cervical Cancer Screening  Never done    Mammogram  2021    COVID-19 Vaccine (4 - Pfizer series) 02/15/2022

## 2023-10-03 ENCOUNTER — CLINICAL SUPPORT (OUTPATIENT)
Dept: OTHER | Facility: CLINIC | Age: 58
End: 2023-10-03
Payer: COMMERCIAL

## 2023-10-03 DIAGNOSIS — Z00.8 ENCOUNTER FOR OTHER GENERAL EXAMINATION: ICD-10-CM

## 2023-10-04 VITALS
WEIGHT: 123 LBS | DIASTOLIC BLOOD PRESSURE: 61 MMHG | HEIGHT: 61 IN | BODY MASS INDEX: 23.22 KG/M2 | SYSTOLIC BLOOD PRESSURE: 97 MMHG

## 2023-10-04 LAB
GLUCOSE SERPL-MCNC: 114 MG/DL (ref 60–140)
HDLC SERPL-MCNC: 58 MG/DL
POC CHOLESTEROL, LDL (DOCK): 114 MG/DL
POC CHOLESTEROL, TOTAL: 182 MG/DL
TRIGL SERPL-MCNC: 50 MG/DL

## 2023-10-11 ENCOUNTER — OFFICE VISIT (OUTPATIENT)
Dept: OBSTETRICS AND GYNECOLOGY | Facility: CLINIC | Age: 58
End: 2023-10-11
Attending: OBSTETRICS & GYNECOLOGY
Payer: COMMERCIAL

## 2023-10-11 ENCOUNTER — LAB VISIT (OUTPATIENT)
Dept: LAB | Facility: OTHER | Age: 58
End: 2023-10-11
Attending: OBSTETRICS & GYNECOLOGY
Payer: COMMERCIAL

## 2023-10-11 VITALS
BODY MASS INDEX: 23.67 KG/M2 | WEIGHT: 125.38 LBS | HEIGHT: 61 IN | SYSTOLIC BLOOD PRESSURE: 104 MMHG | DIASTOLIC BLOOD PRESSURE: 65 MMHG

## 2023-10-11 DIAGNOSIS — Z78.0 MENOPAUSE PRESENT: ICD-10-CM

## 2023-10-11 DIAGNOSIS — N95.2 POSTMENOPAUSAL ATROPHIC VAGINITIS: ICD-10-CM

## 2023-10-11 DIAGNOSIS — Z01.419 ENCOUNTER FOR GYNECOLOGICAL EXAMINATION WITHOUT ABNORMAL FINDING: Primary | ICD-10-CM

## 2023-10-11 DIAGNOSIS — R68.82 DECREASED LIBIDO: ICD-10-CM

## 2023-10-11 LAB
ESTRADIOL SERPL-MCNC: <10 PG/ML
TESTOST SERPL-MCNC: 28 NG/DL (ref 5–73)

## 2023-10-11 PROCEDURE — 3078F PR MOST RECENT DIASTOLIC BLOOD PRESSURE < 80 MM HG: ICD-10-PCS | Mod: CPTII,S$GLB,, | Performed by: OBSTETRICS & GYNECOLOGY

## 2023-10-11 PROCEDURE — 3008F PR BODY MASS INDEX (BMI) DOCUMENTED: ICD-10-PCS | Mod: CPTII,S$GLB,, | Performed by: OBSTETRICS & GYNECOLOGY

## 2023-10-11 PROCEDURE — 3044F HG A1C LEVEL LT 7.0%: CPT | Mod: CPTII,S$GLB,, | Performed by: OBSTETRICS & GYNECOLOGY

## 2023-10-11 PROCEDURE — 99386 PR PREVENTIVE VISIT,NEW,40-64: ICD-10-PCS | Mod: S$GLB,,, | Performed by: OBSTETRICS & GYNECOLOGY

## 2023-10-11 PROCEDURE — 36415 COLL VENOUS BLD VENIPUNCTURE: CPT | Performed by: OBSTETRICS & GYNECOLOGY

## 2023-10-11 PROCEDURE — 3074F SYST BP LT 130 MM HG: CPT | Mod: CPTII,S$GLB,, | Performed by: OBSTETRICS & GYNECOLOGY

## 2023-10-11 PROCEDURE — 84402 ASSAY OF FREE TESTOSTERONE: CPT | Performed by: OBSTETRICS & GYNECOLOGY

## 2023-10-11 PROCEDURE — 99999 PR PBB SHADOW E&M-EST. PATIENT-LVL III: CPT | Mod: PBBFAC,,, | Performed by: OBSTETRICS & GYNECOLOGY

## 2023-10-11 PROCEDURE — 1160F PR REVIEW ALL MEDS BY PRESCRIBER/CLIN PHARMACIST DOCUMENTED: ICD-10-PCS | Mod: CPTII,S$GLB,, | Performed by: OBSTETRICS & GYNECOLOGY

## 2023-10-11 PROCEDURE — 1159F PR MEDICATION LIST DOCUMENTED IN MEDICAL RECORD: ICD-10-PCS | Mod: CPTII,S$GLB,, | Performed by: OBSTETRICS & GYNECOLOGY

## 2023-10-11 PROCEDURE — 82670 ASSAY OF TOTAL ESTRADIOL: CPT | Performed by: OBSTETRICS & GYNECOLOGY

## 2023-10-11 PROCEDURE — 99386 PREV VISIT NEW AGE 40-64: CPT | Mod: S$GLB,,, | Performed by: OBSTETRICS & GYNECOLOGY

## 2023-10-11 PROCEDURE — 3074F PR MOST RECENT SYSTOLIC BLOOD PRESSURE < 130 MM HG: ICD-10-PCS | Mod: CPTII,S$GLB,, | Performed by: OBSTETRICS & GYNECOLOGY

## 2023-10-11 PROCEDURE — 1160F RVW MEDS BY RX/DR IN RCRD: CPT | Mod: CPTII,S$GLB,, | Performed by: OBSTETRICS & GYNECOLOGY

## 2023-10-11 PROCEDURE — 3008F BODY MASS INDEX DOCD: CPT | Mod: CPTII,S$GLB,, | Performed by: OBSTETRICS & GYNECOLOGY

## 2023-10-11 PROCEDURE — 99999 PR PBB SHADOW E&M-EST. PATIENT-LVL III: ICD-10-PCS | Mod: PBBFAC,,, | Performed by: OBSTETRICS & GYNECOLOGY

## 2023-10-11 PROCEDURE — 84403 ASSAY OF TOTAL TESTOSTERONE: CPT | Performed by: OBSTETRICS & GYNECOLOGY

## 2023-10-11 PROCEDURE — 3078F DIAST BP <80 MM HG: CPT | Mod: CPTII,S$GLB,, | Performed by: OBSTETRICS & GYNECOLOGY

## 2023-10-11 PROCEDURE — 1159F MED LIST DOCD IN RCRD: CPT | Mod: CPTII,S$GLB,, | Performed by: OBSTETRICS & GYNECOLOGY

## 2023-10-11 PROCEDURE — 3044F PR MOST RECENT HEMOGLOBIN A1C LEVEL <7.0%: ICD-10-PCS | Mod: CPTII,S$GLB,, | Performed by: OBSTETRICS & GYNECOLOGY

## 2023-10-11 RX ORDER — ESTRADIOL 0.1 MG/G
1 CREAM VAGINAL
Qty: 42 G | Refills: 3 | Status: SHIPPED | OUTPATIENT
Start: 2023-10-12 | End: 2023-10-11

## 2023-10-11 RX ORDER — ESTRADIOL 0.1 MG/G
1 CREAM VAGINAL
Qty: 42 G | Refills: 3 | Status: SHIPPED | OUTPATIENT
Start: 2023-10-11

## 2023-10-11 RX ORDER — FLUOXETINE HYDROCHLORIDE 20 MG/1
20 CAPSULE ORAL DAILY
Qty: 30 CAPSULE | Refills: 6 | Status: SHIPPED | OUTPATIENT
Start: 2023-10-11 | End: 2024-03-06

## 2023-10-11 NOTE — PROGRESS NOTES
Subjective:       Patient ID: Lakia Wright is a 58 y.o. female.    Chief Complaint:  Annual Exam and Gynecologic Exam      History of Present Illness  Gynecologic Exam  Pertinent negatives include no abdominal pain, back pain or headaches.       Lakia Wright is a 58 y.o. female   new to me, patient of Dr. Bhagat, here for her annual GYN exam.    She is menopausal since age 49, took HRT for a few years but stopped due to concerns about safety. No longer has vasomotor symptoms, uses vaginal estradiol cream for vaginal dryness and takes prozac for moodiness which were prescribed by Dr. Bhagat. She does report continued low libido and is interested in possible treatment. Will schedule for hormone consultation to discuss all options.   denies break through bleeding.   denies vaginal itching or irritation.  Denies vaginal discharge.  She is sexually active. She has had1 partner for 30 years .     History of abnormal pap: Yes - had repeat which was normal  Last Pap: approximate date 2022 and was normal  Last MMG: normal-2023: (at [facility]?)-routine follow-up in 12 months  Last Colonoscopy:  colonoscopy 5 years ago without abnormalities.  denies domestic violence. She does feel safe at home.     Past Medical History:   Diagnosis Date    Allergic rhinitis     Depression      Past Surgical History:   Procedure Laterality Date     SECTION, LOW TRANSVERSE      COLONOSCOPY N/A 2018    Procedure: COLONOSCOPY Clenpiq;  Surgeon: Maria Alejandra Laguna MD;  Location: [facility];  Service: Endoscopy;  Laterality: N/A;     Social History     Socioeconomic History    Marital status:     Number of children: 2   Tobacco Use    Smoking status: Never     Passive exposure: Never    Smokeless tobacco: Never   Substance and Sexual Activity    Alcohol use: Yes    Drug use: No    Sexual activity: Yes     Partners: Male     Comment: , together since    Social History Narrative    Tries to   do  "exercise regularly but has not recently    Tries to eat healthy diet but has been slacked off recently         Social Determinants of Health     Stress: No Stress Concern Present (2019)    [employer] [place] [employer] - Occupational Stress Questionnaire     Feeling of Stress : Not at all     Family History   Problem Relation Age of Onset    Coronary artery disease Paternal Grandfather     Diabetes Father     Hyperlipidemia Father     Hypertension Father     Stroke Father     Melanoma Neg Hx     Psoriasis Neg Hx     Lupus Neg Hx     Breast cancer Neg Hx     Colon cancer Neg Hx      OB History          2    Para   2    Term   2            AB        Living   2         SAB        IAB        Ectopic        Multiple        Live Births   2                 /65   Ht 5' 1" (1.549 m)   Wt 56.9 kg (125 lb 6.4 oz)   LMP 2014 (Approximate)   BMI 23.69 kg/m²         GYN & OB History  Patient's last menstrual period was 2014 (approximate).   Date of Last Pap: No result found    OB History    Para Term  AB Living   2 2 2     2   SAB IAB Ectopic Multiple Live Births           2      # Outcome Date GA Lbr Kaz/2nd Weight Sex Delivery Anes PTL Lv   2 Term      CS-LTranv   NADEGE   1 Term      CS-LTranv   NADEGE       Review of Systems  Review of Systems   Constitutional:  Negative for activity change, appetite change, fatigue and unexpected weight change.   HENT: Negative.     Eyes:  Negative for visual disturbance.   Respiratory:  Negative for shortness of breath and wheezing.    Cardiovascular:  Negative for chest pain, palpitations and leg swelling.   Gastrointestinal:  Negative for abdominal pain, bloating and blood in stool.   Endocrine: Negative for diabetes and hair loss.   Genitourinary:  Positive for vaginal dryness. Negative for decreased libido, dyspareunia and hot flashes.   Musculoskeletal:  Negative for back pain and joint swelling.   Integumentary:  Negative for " acne, hair changes and nipple discharge.   Neurological:  Negative for headaches.   Hematological:  Does not bruise/bleed easily.   Psychiatric/Behavioral:  Negative for depression and sleep disturbance. The patient is not nervous/anxious.    Breast: Negative for mastodynia and nipple discharge          Objective:      Physical Exam:   Constitutional: She is oriented to person, place, and time. She appears well-developed and well-nourished.    HENT:   Head: Normocephalic and atraumatic.    Eyes: Pupils are equal, round, and reactive to light. EOM are normal.     Cardiovascular:  Normal rate and regular rhythm.             Pulmonary/Chest: Effort normal and breath sounds normal.   BREASTS:  no mass, no tenderness, no deformity and no retraction. Right breast exhibits no inverted nipple, no mass, no nipple discharge, no skin change, no tenderness, no bleeding and no swelling. Left breast exhibits no inverted nipple, no mass, no nipple discharge, no skin change, no tenderness, no bleeding and no swelling. Breasts are symmetrical.              Abdominal: Soft. Bowel sounds are normal.     Genitourinary:    Pelvic exam was performed with patient supine.      Genitourinary Comments: PELVIC: Normal external genitalia without lesions.  Normal hair distribution.  Adequate perineal body, normal urethral meatus.  Vagina  Dry and poorly rugated, atrophic, without lesions or discharge.  Cervix pink, without lesions, discharge or tenderness.  No significant cystocele or rectocele.  Bimanual exam shows uterus to be normal size, regular, mobile and nontender.  Adnexa without masses or tenderness.    RECTAL:Deferred                 Musculoskeletal: Normal range of motion and moves all extremeties.       Neurological: She is alert and oriented to person, place, and time.    Skin: Skin is warm and dry.    Psychiatric: She has a normal mood and affect.              Assessment:        1. Encounter for gynecological examination without  abnormal finding    2. Postmenopausal atrophic vaginitis    3. Menopause present    4. Decreased libido                Plan:        Problem List Items Addressed This Visit    None  Visit Diagnoses       Encounter for gynecological examination without abnormal finding    -  Primary    Postmenopausal atrophic vaginitis        Relevant Medications    estradioL (ESTRACE) 0.01 % (0.1 mg/gram) vaginal cream    Menopause present        Relevant Medications    FLUoxetine 20 MG capsule    Other Relevant Orders    Testosterone, Free    Testosterone    Estradiol    Decreased libido                 Follow up if symptoms worsen or fail to improve.

## 2023-10-16 LAB — TESTOST FREE SERPL-MCNC: 0.6 PG/ML

## 2023-12-06 ENCOUNTER — OFFICE VISIT (OUTPATIENT)
Dept: OBSTETRICS AND GYNECOLOGY | Facility: CLINIC | Age: 58
End: 2023-12-06
Attending: OBSTETRICS & GYNECOLOGY
Payer: COMMERCIAL

## 2023-12-06 DIAGNOSIS — N95.1 MENOPAUSAL SYNDROME: Primary | ICD-10-CM

## 2023-12-06 PROCEDURE — 3044F PR MOST RECENT HEMOGLOBIN A1C LEVEL <7.0%: ICD-10-PCS | Mod: CPTII,95,, | Performed by: OBSTETRICS & GYNECOLOGY

## 2023-12-06 PROCEDURE — 99214 PR OFFICE/OUTPT VISIT, EST, LEVL IV, 30-39 MIN: ICD-10-PCS | Mod: 95,,, | Performed by: OBSTETRICS & GYNECOLOGY

## 2023-12-06 PROCEDURE — 99214 OFFICE O/P EST MOD 30 MIN: CPT | Mod: 95,,, | Performed by: OBSTETRICS & GYNECOLOGY

## 2023-12-06 PROCEDURE — 3044F HG A1C LEVEL LT 7.0%: CPT | Mod: CPTII,95,, | Performed by: OBSTETRICS & GYNECOLOGY

## 2023-12-06 RX ORDER — ESTRADIOL AND PROGESTERONE 1; 100 MG/1; MG/1
1 CAPSULE ORAL NIGHTLY
Qty: 30 CAPSULE | Refills: 11 | Status: SHIPPED | OUTPATIENT
Start: 2023-12-06 | End: 2023-12-20

## 2023-12-06 NOTE — PROGRESS NOTES
The patient location is: Home  The chief complaint leading to consultation is: Menopause    Visit type: audiovisual    Face to Face time with patient: 23 minutes  33 minutes of total time spent on the encounter, which includes face to face time and non-face to face time preparing to see the patient (eg, review of tests), Obtaining and/or reviewing separately obtained history, Documenting clinical information in the electronic or other health record, Independently interpreting results (not separately reported) and communicating results to the patient/family/caregiver, or Care coordination (not separately reported).         Each patient to whom he or she provides medical services by telemedicine is:  (1) informed of the relationship between the physician and patient and the respective role of any other health care provider with respect to management of the patient; and (2) notified that he or she may decline to receive medical services by telemedicine and may withdraw from such care at any time.    Notes:        Lakia Wright is a 58 y.o. female who presents to discuss hormone replacement therapy.  Menarche occurred at age 10 and the patient went into menopause at 49 years of age, which was 9 years ago.  She states that she was on HRT for about 5 years, stopped the Estradiol and progesterone because she felt that it wasn't doing much and she had concerns about its safety. She did also try injections of Testosterone but stopped them because of increased facial hair and thinning head hair. She currently denies vasomotor symptoms or insomnia. She was started on Vaginal estradiol cream a few weeks ago for VVS.  Patient is requesting hormone replacement therapy due to low libido primarily .The patient is not taking hormone replacement therapy. Patient denies post-menopausal vaginal bleeding. The patient is sexually active.  She denies the following contraindications to HRT:  Vaginal bleeding, history of VTE/PE,  thrombophilia,  breast cancer, or active liver disease.       PCP: Dr. Juárez       Routine labs: 2023  Pap smear: 2022  Mammogram: 2023: Normal  DEXA: 2022  Colonoscopy: NA    Lab Visit on 10/11/2023   Component Date Value Ref Range Status    Testosterone, Free 10/11/2023 0.6  pg/mL Final    Testosterone, Total 10/11/2023 28  5 - 73 ng/dL Final    Estradiol 10/11/2023 <10 (A)  See Text pg/mL Final   Clinical Support on 10/03/2023   Component Date Value Ref Range Status    POC Cholesterol, Total 10/03/2023 182  <240 MG/DL Final    POC Cholesterol, HDL 10/03/2023 58  MG/DL Final    POC Cholesterol, LDL 10/03/2023 114  <160 MG/DL Final    POC Triglycerides 10/03/2023 50  <160 MG/DL Final    POC Glucose 10/03/2023 114  60 - 140 MG/DL Final       Past Medical History:   Diagnosis Date    Allergic rhinitis     Depression      Past Surgical History:   Procedure Laterality Date     SECTION, LOW TRANSVERSE      COLONOSCOPY N/A 2018    Procedure: COLONOSCOPY Clenpiq;  Surgeon: Maria Alejandra Laguna MD;  Location: [facility];  Service: Endoscopy;  Laterality: N/A;     Social History     Tobacco Use    Smoking status: Never     Passive exposure: Never    Smokeless tobacco: Never   Substance Use Topics    Alcohol use: Yes    Drug use: No     Family History   Problem Relation Age of Onset    Coronary artery disease Paternal Grandfather     Diabetes Father     Hyperlipidemia Father     Hypertension Father     Stroke Father     Melanoma Neg Hx     Psoriasis Neg Hx     Lupus Neg Hx     Breast cancer Neg Hx     Colon cancer Neg Hx      OB History    Para Term  AB Living   2 2 2     2   SAB IAB Ectopic Multiple Live Births           2      # Outcome Date GA Lbr Kaz/2nd Weight Sex Delivery Anes PTL Lv   2 Term      CS-LTranv   NADEGE   1 Term      CS-LTranv   NADEGE       Current Outpatient Medications:     azelastine (ASTELIN) 137 mcg (0.1 %) nasal spray, 2 sprays (274 mcg total) by Nasal route 2  (two) times daily., Disp: 30 mL, Rfl: 1    estradioL (ESTRACE) 0.01 % (0.1 mg/gram) vaginal cream, Place 1 g vaginally 3 (three) times a week., Disp: 42 g, Rfl: 3    estradiol-progesterone (BIJUVA) 1-100 mg Cap, Take 1 capsule by mouth every evening., Disp: 30 capsule, Rfl: 11    FLUoxetine 20 MG capsule, Take 1 capsule (20 mg total) by mouth once daily., Disp: 30 capsule, Rfl: 6    fluticasone propionate (FLONASE) 50 mcg/actuation nasal spray, 1 spray (50 mcg total) by Each Nostril route once daily., Disp: 16 g, Rfl: 3    tretinoin (RETIN-A) 0.05 % cream, , Disp: , Rfl:     triamcinolone acetonide 0.1% (KENALOG) 0.1 % cream, Apply topically 2 (two) times daily. for 10 days, Disp: 15 g, Rfl: 1    UNABLE TO FIND, Apply 0.25 g topically Daily. Testosterone 1% gel, Disp: 15 g, Rfl: 5    Review of Systems:  General: No fever, chills, or weight loss.  Chest: No chest pain, shortness of breath, or palpitations.  Breast: No pain, masses, or nipple discharge.  Vulva: No pain, lesions, or itching.  Vagina: No relaxation, itching, discharge, or lesions.  Abdomen: No pain, nausea, vomiting, diarrhea, or constipation.  Urinary: No incontinence, nocturia, frequency, or dysuria.  Extremities:  No leg cramps, edema, or calf pain.  Neurologic: No headaches, dizziness, or visual changes.    There were no vitals filed for this visit.  There is no height or weight on file to calculate BMI.    Assessment:    Menopausal syndrome  -     estradiol-progesterone (BIJUVA) 1-100 mg Cap; Take 1 capsule by mouth every evening.  Dispense: 30 capsule; Refill: 11  -     UNABLE TO FIND; Apply 0.25 g topically Daily. Testosterone 1% gel  Dispense: 15 g; Refill: 5        Plan:   Risks and benefits of hormone replacement therapy were discussed.  Hormone replacement therapy options, including bioidentical versus non-bioidentical hormones, as well as alternatives discussed.    We spent a significant amt of time discussing estrogen replacement theropy.   We discussed the risks and benefits including breast cancer and embolic risk, osteoporosis and heart and colon health benefits.  Pt understands that there are many different ways to take estrogen and many different doses and that she does not have to take long term unless she chooses.  She understands that if she still has her uterus, she will need to take progesterone with this to decrease risk of endometrial cancer.We discussed side effects that might include but not limited to headaches, edema, nausea.   We did discuss that transdermal option of estrogen is safer than oral estradiol as transdermal methods decrease risk for blood clots and stroke as they bypass liver metabolism.     The patient and I have discussed testosterone therapy and its risks and benefits.  The risks include increased increasing cholesterol levels, facial hair and other hair growth, acne, increased sized of clitoris, deepening of voice, anxiety as well as other side effects.  Benefits include increased energy level, increased libido, easier orgasm and potiential increased muscle mass.  Pt understands that different women have different amounts of risks and benefits to this med and that she can stop the medication at any time.    PLAN:  BIjuva daily  Testosterone 1% gel to inner thigh, 0.25 daily    Follow up in 3 months  Will recheck labs once on typical optimal dose or if having side effects.  Instructed patient to call if she experiences any side effects or has any questions.

## 2023-12-13 ENCOUNTER — TELEPHONE (OUTPATIENT)
Dept: UROLOGY | Facility: CLINIC | Age: 58
End: 2023-12-13
Payer: COMMERCIAL

## 2023-12-13 NOTE — TELEPHONE ENCOUNTER
----- Message from Mikayla Duarte sent at 12/13/2023  8:58 AM CST -----  .Type:  Needs Medical Advice    Who Called:  pt  Symptoms (please be specific):  passed kidney stone she has   How long has patient had these symptoms:    Pharmacy name and phone #:    Would the patient rather a call back or a response via [facility]?   Best Call Back Number:  [phone]  Additional Information: wants to speak to the office needs to jakeo if she should still keep her appt

## 2023-12-13 NOTE — TELEPHONE ENCOUNTER
Spoke to patient and notified her that she still needs to keep appointment with  being that she is a new patient.

## 2023-12-15 ENCOUNTER — PATIENT MESSAGE (OUTPATIENT)
Dept: OBSTETRICS AND GYNECOLOGY | Facility: CLINIC | Age: 58
End: 2023-12-15
Payer: COMMERCIAL

## 2023-12-15 ENCOUNTER — OFFICE VISIT (OUTPATIENT)
Dept: UROLOGY | Facility: CLINIC | Age: 58
End: 2023-12-15
Payer: COMMERCIAL

## 2023-12-15 VITALS
DIASTOLIC BLOOD PRESSURE: 73 MMHG | WEIGHT: 125 LBS | HEART RATE: 65 BPM | BODY MASS INDEX: 23.6 KG/M2 | SYSTOLIC BLOOD PRESSURE: 125 MMHG | HEIGHT: 61 IN

## 2023-12-15 DIAGNOSIS — N20.0 KIDNEY STONE ON RIGHT SIDE: ICD-10-CM

## 2023-12-15 DIAGNOSIS — N23 RENAL COLIC ON RIGHT SIDE: ICD-10-CM

## 2023-12-15 DIAGNOSIS — K76.9 LIVER DISEASE, UNSPECIFIED: Primary | ICD-10-CM

## 2023-12-15 PROCEDURE — 3078F PR MOST RECENT DIASTOLIC BLOOD PRESSURE < 80 MM HG: ICD-10-PCS | Mod: CPTII,S$GLB,, | Performed by: UROLOGY

## 2023-12-15 PROCEDURE — 3008F BODY MASS INDEX DOCD: CPT | Mod: CPTII,S$GLB,, | Performed by: UROLOGY

## 2023-12-15 PROCEDURE — 3078F DIAST BP <80 MM HG: CPT | Mod: CPTII,S$GLB,, | Performed by: UROLOGY

## 2023-12-15 PROCEDURE — 1159F MED LIST DOCD IN RCRD: CPT | Mod: CPTII,S$GLB,, | Performed by: UROLOGY

## 2023-12-15 PROCEDURE — 3044F HG A1C LEVEL LT 7.0%: CPT | Mod: CPTII,S$GLB,, | Performed by: UROLOGY

## 2023-12-15 PROCEDURE — 1159F PR MEDICATION LIST DOCUMENTED IN MEDICAL RECORD: ICD-10-PCS | Mod: CPTII,S$GLB,, | Performed by: UROLOGY

## 2023-12-15 PROCEDURE — 1160F RVW MEDS BY RX/DR IN RCRD: CPT | Mod: CPTII,S$GLB,, | Performed by: UROLOGY

## 2023-12-15 PROCEDURE — 3008F PR BODY MASS INDEX (BMI) DOCUMENTED: ICD-10-PCS | Mod: CPTII,S$GLB,, | Performed by: UROLOGY

## 2023-12-15 PROCEDURE — 3044F PR MOST RECENT HEMOGLOBIN A1C LEVEL <7.0%: ICD-10-PCS | Mod: CPTII,S$GLB,, | Performed by: UROLOGY

## 2023-12-15 PROCEDURE — 3074F SYST BP LT 130 MM HG: CPT | Mod: CPTII,S$GLB,, | Performed by: UROLOGY

## 2023-12-15 PROCEDURE — 99245 PR OFFICE CONSULTATION,LEVEL V: ICD-10-PCS | Mod: S$GLB,,, | Performed by: UROLOGY

## 2023-12-15 PROCEDURE — 82365 CALCULUS SPECTROSCOPY: CPT | Performed by: UROLOGY

## 2023-12-15 PROCEDURE — 99999 PR PBB SHADOW E&M-EST. PATIENT-LVL IV: CPT | Mod: PBBFAC,,, | Performed by: UROLOGY

## 2023-12-15 PROCEDURE — 1160F PR REVIEW ALL MEDS BY PRESCRIBER/CLIN PHARMACIST DOCUMENTED: ICD-10-PCS | Mod: CPTII,S$GLB,, | Performed by: UROLOGY

## 2023-12-15 PROCEDURE — 3074F PR MOST RECENT SYSTOLIC BLOOD PRESSURE < 130 MM HG: ICD-10-PCS | Mod: CPTII,S$GLB,, | Performed by: UROLOGY

## 2023-12-15 PROCEDURE — 99999 PR PBB SHADOW E&M-EST. PATIENT-LVL IV: ICD-10-PCS | Mod: PBBFAC,,, | Performed by: UROLOGY

## 2023-12-15 PROCEDURE — 99245 OFF/OP CONSLTJ NEW/EST HI 55: CPT | Mod: S$GLB,,, | Performed by: UROLOGY

## 2023-12-15 RX ORDER — RUXOLITINIB 15 MG/G
CREAM TOPICAL 2 TIMES DAILY
COMMUNITY
Start: 2023-12-05

## 2023-12-15 NOTE — PROGRESS NOTES
Subjective:      Patient ID: Lakia Wright is a 58 y.o. female.    Chief Complaint: No chief complaint on file.    Mr. Wright is a 58-year-old female referred from [facility] emergency room Dr. Silva for presentation with a right ureteral calculus causing obstruction, nausea and vomiting.  This was a proximally 5 days ago.  The patient was able to pass her stone within 2 days of presenting to the emergency room.  She had a 6 mm distal ureteral calculus with severe hydroureteronephrosis.  Patient has never had renal calculi before and CT scan does not show any other renal calculi.  Patient was referred to emergently in case she needed intervention.  The patient is no longer having nausea vomiting or pain.  Her emergency room evaluation revealed a liver lesion which is consistent with hemangioma based on evaluation but will have to do a contrast study specifically looking at the liver to define this.  Patient was made aware we will schedule this during her time off.    Flank Pain  Chronicity: New onset right-sided flank pain with nausea and vomiting due to obstructing distal ureteral calculus. The current episode started in the past 7 days. The problem occurs rarely. The problem has been resolved since onset. Pain location: No pain since passing stone. Pertinent negatives include no abdominal pain, chest pain, dysuria, fever, headaches, pelvic pain or weakness. Risk factors include history of osteoporosis, menopause and renal stones (Mild Osteoporosis). She has tried analgesics and NSAIDs (Tamsulosin) for the symptoms. The treatment provided significant relief.   Review of Systems   Constitutional:  Negative for activity change, appetite change, chills, fatigue and fever.   HENT:  Negative for congestion, ear pain, hearing loss, nosebleeds, sinus pressure, sore throat and trouble swallowing.    Eyes:  Negative for pain and visual disturbance.   Respiratory:  Negative for apnea, cough and shortness of  breath.    Cardiovascular:  Negative for chest pain and leg swelling.   Gastrointestinal:  Negative for abdominal distention, abdominal pain, anal bleeding, blood in stool, constipation, diarrhea, nausea, rectal pain and vomiting.   Endocrine: Negative for cold intolerance, heat intolerance, polydipsia, polyphagia and polyuria.   Genitourinary:  Positive for flank pain. Negative for decreased urine volume, difficulty urinating, dyspareunia, dysuria, enuresis, frequency, genital sores, hematuria, menstrual problem, pelvic pain, urgency, vaginal bleeding, vaginal discharge and vaginal pain.   Musculoskeletal:  Negative for arthralgias and back pain.   Skin:  Negative for color change, pallor and rash.   Allergic/Immunologic: Negative for environmental allergies, food allergies and immunocompromised state.   Neurological:  Negative for dizziness, speech difficulty, weakness and headaches.   Hematological:  Negative for adenopathy. Does not bruise/bleed easily.   Psychiatric/Behavioral: Negative.      Objective:     Physical Exam  Vitals and nursing note reviewed.   Constitutional:       General: She is not in acute distress.     Appearance: Normal appearance. She is well-developed. She is not ill-appearing, toxic-appearing or diaphoretic.   HENT:      Head: Normocephalic.      Nose: Nose normal.   Eyes:      Conjunctiva/sclera: Conjunctivae normal.      Pupils: Pupils are equal, round, and reactive to light.   Cardiovascular:      Rate and Rhythm: Normal rate and regular rhythm.      Heart sounds: Normal heart sounds.   Pulmonary:      Effort: Pulmonary effort is normal.      Breath sounds: Normal breath sounds.   Abdominal:      General: Bowel sounds are normal.      Palpations: Abdomen is soft.      Tenderness: There is no right CVA tenderness or left CVA tenderness.   Musculoskeletal:         General: Normal range of motion.      Cervical back: Normal range of motion and neck supple.   Skin:     General: Skin is  warm and dry.   Neurological:      Mental Status: She is alert and oriented to person, place, and time.      Deep Tendon Reflexes: Reflexes are normal and symmetric.   Psychiatric:         Behavior: Behavior normal.         Thought Content: Thought content normal.         Judgment: Judgment normal.      Assessment:      1. Liver disease, unspecified    2. Kidney stone on right side    3. Renal colic on right side      Plan:     Patient Instructions   Patient encouraged to increase water intake to 116 oz bottle of water 4 times daily  Will obtain yearly renal ultrasound to evaluate for parapelvic cyst and follow them.  Will obtain CT scan with and without contrast of liver to evaluate liver lesion found on stone study.  Patient needs repeat kidney function test because of elevated BUN at the time of presentation to the emergency room with her kidney stone.  This was more than likely due to nausea and vomiting but will have to be normal in order to do the contrast study.  Stone for analysis

## 2023-12-15 NOTE — LETTER
December 15, 2023        Aristeo Juárez MD  [address]  [address]  [place] [address]             [facility]  [address]  [address]  [place] [address]  Phone: [phone]  Fax: [phone]   Patient: Lakia Wright   MR Number: [id number]   YOB: 1965   Date of Visit: 12/15/2023       Dear Dr. Juárez:    Thank you for referring Lakia Wright to me for evaluation. Below are the relevant portions of my assessment and plan of care.            If you have questions, please do not hesitate to call me. I look forward to following Lakia along with you.    Sincerely,      Santos Stein MD           CC    No Recipients

## 2023-12-15 NOTE — PATIENT INSTRUCTIONS
Patient encouraged to increase water intake to 116 oz bottle of water 4 times daily  Will obtain yearly renal ultrasound to evaluate for parapelvic cyst and follow them.  Will obtain CT scan with and without contrast of liver to evaluate liver lesion found on stone study.  Patient needs repeat kidney function test because of elevated BUN at the time of presentation to the emergency room with her kidney stone.  This was more than likely due to nausea and vomiting but will have to be normal in order to do the contrast study.  Stone for analysis

## 2023-12-20 DIAGNOSIS — N95.1 MENOPAUSAL SYNDROME: Primary | ICD-10-CM

## 2023-12-20 LAB
COMPN STONE: NORMAL
LABORATORY COMMENT REPORT: NORMAL
SPECIMEN SOURCE: NORMAL
STONE ANALYSIS IR-IMP: NORMAL

## 2023-12-20 RX ORDER — PROGESTERONE 100 MG/1
100 CAPSULE ORAL NIGHTLY
Qty: 30 CAPSULE | Refills: 6 | Status: SHIPPED | OUTPATIENT
Start: 2023-12-20 | End: 2024-03-06

## 2023-12-20 RX ORDER — ESTRADIOL 1 MG/1
1 TABLET ORAL DAILY
Qty: 30 TABLET | Refills: 6 | Status: SHIPPED | OUTPATIENT
Start: 2023-12-20 | End: 2024-03-06 | Stop reason: DRUGHIGH

## 2023-12-21 ENCOUNTER — PATIENT MESSAGE (OUTPATIENT)
Dept: UROLOGY | Facility: CLINIC | Age: 58
End: 2023-12-21
Payer: COMMERCIAL

## 2023-12-27 ENCOUNTER — PATIENT MESSAGE (OUTPATIENT)
Dept: UROLOGY | Facility: CLINIC | Age: 58
End: 2023-12-27
Payer: COMMERCIAL

## 2024-02-14 ENCOUNTER — LAB VISIT (OUTPATIENT)
Dept: LAB | Facility: HOSPITAL | Age: 59
End: 2024-02-14
Payer: COMMERCIAL

## 2024-02-14 DIAGNOSIS — N95.1 MENOPAUSAL SYNDROME: ICD-10-CM

## 2024-02-14 LAB — TESTOST SERPL-MCNC: 50 NG/DL (ref 5–73)

## 2024-02-14 PROCEDURE — 84402 ASSAY OF FREE TESTOSTERONE: CPT | Performed by: OBSTETRICS & GYNECOLOGY

## 2024-02-14 PROCEDURE — 84403 ASSAY OF TOTAL TESTOSTERONE: CPT | Performed by: OBSTETRICS & GYNECOLOGY

## 2024-02-19 LAB — TESTOST FREE SERPL-MCNC: 0.8 PG/ML

## 2024-03-06 ENCOUNTER — OFFICE VISIT (OUTPATIENT)
Dept: OBSTETRICS AND GYNECOLOGY | Facility: CLINIC | Age: 59
End: 2024-03-06
Attending: OBSTETRICS & GYNECOLOGY
Payer: COMMERCIAL

## 2024-03-06 DIAGNOSIS — Z78.0 MENOPAUSE PRESENT: ICD-10-CM

## 2024-03-06 DIAGNOSIS — N95.1 MENOPAUSAL SYNDROME: ICD-10-CM

## 2024-03-06 PROCEDURE — 1159F MED LIST DOCD IN RCRD: CPT | Mod: CPTII,95,, | Performed by: OBSTETRICS & GYNECOLOGY

## 2024-03-06 PROCEDURE — 99214 OFFICE O/P EST MOD 30 MIN: CPT | Mod: 95,,, | Performed by: OBSTETRICS & GYNECOLOGY

## 2024-03-06 PROCEDURE — 1160F RVW MEDS BY RX/DR IN RCRD: CPT | Mod: CPTII,95,, | Performed by: OBSTETRICS & GYNECOLOGY

## 2024-03-06 RX ORDER — ESTRADIOL 1 MG/1
1 TABLET ORAL DAILY
Qty: 90 TABLET | Refills: 1 | Status: SHIPPED | OUTPATIENT
Start: 2024-03-06 | End: 2025-03-06

## 2024-03-06 RX ORDER — PROGESTERONE 100 MG/1
100 CAPSULE ORAL NIGHTLY
Qty: 90 CAPSULE | Refills: 1 | Status: SHIPPED | OUTPATIENT
Start: 2024-03-06 | End: 2025-03-06

## 2024-03-06 RX ORDER — FLUOXETINE HYDROCHLORIDE 20 MG/1
20 CAPSULE ORAL DAILY
Qty: 90 CAPSULE | Refills: 1 | Status: SHIPPED | OUTPATIENT
Start: 2024-03-06

## 2024-03-06 NOTE — PROGRESS NOTES
The patient location is: Home  The chief complaint leading to consultation is: Menopausal syndrome    Visit type: audiovisual    Face to Face time with patient: 30 minutes  35 minutes of total time spent on the encounter, which includes face to face time and non-face to face time preparing to see the patient (eg, review of tests), Obtaining and/or reviewing separately obtained history, Documenting clinical information in the electronic or other health record, Independently interpreting results (not separately reported) and communicating results to the patient/family/caregiver, or Care coordination (not separately reported).         Each patient to whom he or she provides medical services by telemedicine is:  (1) informed of the relationship between the physician and patient and the respective role of any other health care provider with respect to management of the patient; and (2) notified that he or she may decline to receive medical services by telemedicine and may withdraw from such care at any time.    Notes:    Lakia Wright is a 59 y.o. female who is here for follow-up of hormone replacement therapy.  At her last visit on 12-6-2023 , she said : Lakia Wright is a 58 y.o. female who presents to discuss hormone replacement therapy.  Menarche occurred at age 10 and the patient went into menopause at 49 years of age, which was 9 years ago.  She states that she was on HRT for about 5 years, stopped the Estradiol and progesterone because she felt that it wasn't doing much and she had concerns about its safety. She did also try injections of Testosterone but stopped them because of increased facial hair and thinning head hair. She currently denies vasomotor symptoms or insomnia. She was started on Vaginal estradiol cream a few weeks ago for VVS.  Patient is requesting hormone replacement therapy due to low libido primarily .The patient is not taking hormone replacement therapy. Patient denies  post-menopausal vaginal bleeding. The patient is sexually active.  She denies the following contraindications to HRT:  Vaginal bleeding, history of VTE/PE, thrombophilia,  breast cancer, or active liver disease.           PLAN on 12-6-2023:  BIjuva daily  Testosterone 1% gel to inner thigh, 0.25 daily    She is currently taking Estradiol 1mg/Prometrium 100mg  daily and Testosterone 1% gel  0.25 g daily.  The patient states the following symptoms have improved:  She feels well overall, sleeps well and denies hot flashes/night sweats. Libido has had slight increase, starting to think about sex a little more.   She had the following side effects: None.  Patient denies post-menopausal vaginal bleeding. The patient is sexually active.     Lab Visit on 02/14/2024   Component Date Value Ref Range Status    Testosterone, Free 02/14/2024 0.8  pg/mL Final    Testosterone, Total 02/14/2024 50  5 - 73 ng/dL Final   Lab Visit on 12/22/2023   Component Date Value Ref Range Status    Sodium 12/22/2023 144  136 - 145 mmol/L Final    Potassium 12/22/2023 4.2  3.5 - 5.1 mmol/L Final    Chloride 12/22/2023 104  95 - 110 mmol/L Final    CO2 12/22/2023 26  23 - 29 mmol/L Final    Glucose 12/22/2023 94  70 - 110 mg/dL Final    BUN 12/22/2023 15  7 - 17 mg/dL Final    Creatinine 12/22/2023 0.64  0.50 - 1.40 mg/dL Final    Calcium 12/22/2023 9.4  8.7 - 10.5 mg/dL Final    Anion Gap 12/22/2023 14  8 - 16 mmol/L Final    eGFR 12/22/2023 >60.0  >60 mL/min/1.73 m^2 Final   Office Visit on 12/15/2023   Component Date Value Ref Range Status    Stone Source 12/15/2023 Passed Stone   Corrected    Stone Analysis 12/15/2023 Test Not Performed   Final    Stone Analysis Comment 12/15/2023 100% Calcium oxalate monohydrate.   Final    Kidney Stone Result Comment 12/15/2023 SEE BELOW   Final   Admission on 12/10/2023, Discharged on 12/10/2023   Component Date Value Ref Range Status    Specimen UA 12/10/2023 Urine, Clean Catch   Final    Color, UA  12/10/2023 Yellow  Yellow, Straw, Mimi Final    Appearance, UA 12/10/2023 Clear  Clear Final    pH, UA 12/10/2023 7.0  5.0 - 8.0 Final    Specific Gravity, UA 12/10/2023 1.025  1.005 - 1.030 Final    Protein, UA 12/10/2023 1+ (A)  Negative Final    Glucose, UA 12/10/2023 Negative  Negative Final    Ketones, UA 12/10/2023 Negative  Negative Final    Bilirubin (UA) 12/10/2023 Negative  Negative Final    Occult Blood UA 12/10/2023 3+ (A)  Negative Final    Nitrite, UA 12/10/2023 Negative  Negative Final    Urobilinogen, UA 12/10/2023 Negative  <2.0 EU/dL Final    Leukocytes, UA 12/10/2023 Negative  Negative Final    WBC 12/10/2023 7.58  3.90 - 12.70 K/uL Final    RBC 12/10/2023 4.52  4.00 - 5.40 M/uL Final    Hemoglobin 12/10/2023 13.8  12.0 - 16.0 g/dL Final    Hematocrit 12/10/2023 41.9  37.0 - 48.5 % Final    MCV 12/10/2023 93  82 - 98 fL Final    MCH 12/10/2023 30.5  27.0 - 31.0 pg Final    MCHC 12/10/2023 32.9  32.0 - 36.0 g/dL Final    RDW 12/10/2023 14.0  11.5 - 14.5 % Final    Platelets 12/10/2023 276  150 - 450 K/uL Final    MPV 12/10/2023 10.2  9.2 - 12.9 fL Final    Immature Granulocytes 12/10/2023 0.3  0.0 - 0.5 % Final    Gran # (ANC) 12/10/2023 4.2  1.8 - 7.7 K/uL Final    Immature Grans (Abs) 12/10/2023 0.02  0.00 - 0.04 K/uL Final    Lymph # 12/10/2023 2.6  1.0 - 4.8 K/uL Final    Mono # 12/10/2023 0.7  0.3 - 1.0 K/uL Final    Eos # 12/10/2023 0.1  0.0 - 0.5 K/uL Final    Baso # 12/10/2023 0.06  0.00 - 0.20 K/uL Final    nRBC 12/10/2023 0  0 /100 WBC Final    Gran % 12/10/2023 55.1  38.0 - 73.0 % Final    Lymph % 12/10/2023 34.0  18.0 - 48.0 % Final    Mono % 12/10/2023 8.7  4.0 - 15.0 % Final    Eosinophil % 12/10/2023 1.1  0.0 - 8.0 % Final    Basophil % 12/10/2023 0.8  0.0 - 1.9 % Final    Differential Method 12/10/2023 Automated   Final    Sodium 12/10/2023 142  136 - 145 mmol/L Final    Potassium 12/10/2023 4.0  3.5 - 5.1 mmol/L Final    Chloride 12/10/2023 102  95 - 110 mmol/L Final    CO2  12/10/2023 29  23 - 29 mmol/L Final    Glucose 12/10/2023 87  70 - 110 mg/dL Final    BUN 12/10/2023 24 (H)  7 - 17 mg/dL Final    Creatinine 12/10/2023 1.03  0.50 - 1.40 mg/dL Final    Calcium 12/10/2023 10.2  8.7 - 10.5 mg/dL Final    Total Protein 12/10/2023 7.8  6.0 - 8.4 g/dL Final    Albumin 12/10/2023 4.5  3.5 - 5.2 g/dL Final    Total Bilirubin 12/10/2023 0.3  0.1 - 1.0 mg/dL Final    Alkaline Phosphatase 12/10/2023 56  38 - 126 U/L Final    AST 12/10/2023 29  15 - 46 U/L Final    ALT 12/10/2023 28  10 - 44 U/L Final    Anion Gap 12/10/2023 11  8 - 16 mmol/L Final    eGFR 12/10/2023 >60.0  >60 mL/min/1.73 m^2 Final    RBC, UA 12/10/2023 >100 (H)  0 - 4 /hpf Final    WBC, UA 12/10/2023 2  0 - 5 /hpf Final    Bacteria 12/10/2023 Moderate (A)  None-Occ /hpf Final    Squam Epithel, UA 12/10/2023 15  /hpf Final    Hyaline Casts, UA 12/10/2023 0  0-1/lpf /lpf Final    Amorphous, UA 12/10/2023 Few  None-Moderate Final    Other (U/A) 12/10/2023 few mucus  None Final    Microscopic Comment 12/10/2023 SEE COMMENT   Final       Past Medical History:   Diagnosis Date    Allergic rhinitis     Depression      Past Surgical History:   Procedure Laterality Date     SECTION, LOW TRANSVERSE      COLONOSCOPY N/A 2018    Procedure: COLONOSCOPY Clenpiq;  Surgeon: Maria Alejandra Laguna MD;  Location: [facility];  Service: Endoscopy;  Laterality: N/A;     Social History     Tobacco Use    Smoking status: Never     Passive exposure: Never    Smokeless tobacco: Never   Substance Use Topics    Alcohol use: Yes    Drug use: No     Family History   Problem Relation Age of Onset    Coronary artery disease Paternal Grandfather     Diabetes Father     Hyperlipidemia Father     Hypertension Father     Stroke Father     Melanoma Neg Hx     Psoriasis Neg Hx     Lupus Neg Hx     Breast cancer Neg Hx     Colon cancer Neg Hx      OB History    Para Term  AB Living   2 2 2     2   SAB IAB Ectopic Multiple Live Births            2      # Outcome Date GA Lbr Kaz/2nd Weight Sex Delivery Anes PTL Lv   2 Term      CS-LTranv   NADEGE   1 Term      CS-LTranv   NADEGE       Current Outpatient Medications:     azelastine (ASTELIN) 137 mcg (0.1 %) nasal spray, 2 sprays (274 mcg total) by Nasal route 2 (two) times daily., Disp: 30 mL, Rfl: 1    estradioL (ESTRACE) 0.01 % (0.1 mg/gram) vaginal cream, Place 1 g vaginally 3 (three) times a week., Disp: 42 g, Rfl: 3    estradioL (ESTRACE) 1 MG tablet, Take 1 tablet (1 mg total) by mouth once daily., Disp: 90 tablet, Rfl: 1    FLUoxetine 20 MG capsule, Take 1 capsule (20 mg total) by mouth once daily., Disp: 90 capsule, Rfl: 1    fluticasone propionate (FLONASE) 50 mcg/actuation nasal spray, 1 spray (50 mcg total) by Each Nostril route once daily., Disp: 16 g, Rfl: 3    ketorolac (TORADOL) 10 mg tablet, Take 1 tablet (10 mg total) by mouth every 6 (six) hours., Disp: 10 tablet, Rfl: 0    ondansetron (ZOFRAN-ODT) 4 MG TbDL, Take 1 tablet (4 mg total) by mouth every 8 (eight) hours as needed., Disp: 14 tablet, Rfl: 1    OPZELURA 1.5 % Crea, Apply topically 2 (two) times daily., Disp: , Rfl:     progesterone (PROMETRIUM) 100 MG capsule, Take 1 capsule (100 mg total) by mouth nightly., Disp: 90 capsule, Rfl: 1    tamsulosin (FLOMAX) 0.4 mg Cap, Take 1 capsule (0.4 mg total) by mouth once daily., Disp: 10 capsule, Rfl: 0    tretinoin (RETIN-A) 0.05 % cream, , Disp: , Rfl:     UNABLE TO FIND, Apply 0.5 g topically Daily. Testosterone 1% gel, Disp: 30 g, Rfl: 5    There were no vitals filed for this visit.  There is no height or weight on file to calculate BMI.       Assessment:    Menopause present  -     FLUoxetine 20 MG capsule; Take 1 capsule (20 mg total) by mouth once daily.  Dispense: 90 capsule; Refill: 1    Menopausal syndrome  -     UNABLE TO FIND; Apply 0.5 g topically Daily. Testosterone 1% gel  Dispense: 30 g; Refill: 5  -     progesterone (PROMETRIUM) 100 MG capsule; Take 1 capsule (100 mg total)  by mouth nightly.  Dispense: 90 capsule; Refill: 1  -     estradioL (ESTRACE) 1 MG tablet; Take 1 tablet (1 mg total) by mouth once daily.  Dispense: 90 tablet; Refill: 1        Plan:   Risks and benefits of hormone replacement therapy were discussed.  Hormone replacement therapy options, including bioidentical versus non-bioidentical hormones, as well as alternatives discussed.    Continue:    Estradiol 1mg  Prometrium 100mg    Start:    Testosterone 1% 0.5g daily(2 clicks)    Follow up in 6 months.  Instructed patient to call if she experiences any side effects or has any questions.

## 2024-03-15 ENCOUNTER — PATIENT MESSAGE (OUTPATIENT)
Dept: ADMINISTRATIVE | Facility: HOSPITAL | Age: 59
End: 2024-03-15
Payer: COMMERCIAL

## 2024-03-18 ENCOUNTER — PATIENT MESSAGE (OUTPATIENT)
Dept: ADMINISTRATIVE | Facility: HOSPITAL | Age: 59
End: 2024-03-18
Payer: COMMERCIAL

## 2024-03-18 ENCOUNTER — PATIENT OUTREACH (OUTPATIENT)
Dept: ADMINISTRATIVE | Facility: HOSPITAL | Age: 59
End: 2024-03-18
Payer: COMMERCIAL

## 2024-03-18 NOTE — PROGRESS NOTES
Population Health Chart Review & Patient Outreach Details      Additional [facility] Health Notes:    Sent portal message per patient's request       Updates Requested / Reviewed:      Updated Care Coordination Note, Care Everywhere, Care Team Updated, and Immunizations Reconciliation Completed or Queried: [facility] Topics Overdue:      [facility] Score: 1     Cervical Cancer Screening    Influenza Vaccine  Pneumonia Vaccine  RSV Vaccine                  Health Maintenance Topic(s) Outreach Outcomes & Actions Taken:    Cervical Cancer Screening - Outreach Outcomes & Actions Taken  : sent msg

## 2024-03-22 ENCOUNTER — TELEPHONE (OUTPATIENT)
Dept: OBSTETRICS AND GYNECOLOGY | Facility: CLINIC | Age: 59
End: 2024-03-22
Payer: COMMERCIAL

## 2024-03-22 NOTE — TELEPHONE ENCOUNTER
Called [facility] to give the verbal script for patients new Testosterone 1% gel that Dr Naranjo phoned in on 03/06/24. Pharmacy said okay they will fill patients new script not the old 0.25 mg.

## 2024-04-11 ENCOUNTER — PATIENT MESSAGE (OUTPATIENT)
Dept: OBSTETRICS AND GYNECOLOGY | Facility: CLINIC | Age: 59
End: 2024-04-11
Payer: COMMERCIAL

## 2024-04-15 DIAGNOSIS — N95.1 MENOPAUSAL SYNDROME: ICD-10-CM

## 2024-06-03 DIAGNOSIS — N95.2 POSTMENOPAUSAL ATROPHIC VAGINITIS: ICD-10-CM

## 2024-06-03 RX ORDER — ESTRADIOL 0.1 MG/G
CREAM VAGINAL
Qty: 43 G | Refills: 3 | Status: SHIPPED | OUTPATIENT
Start: 2024-06-03

## 2024-06-03 NOTE — TELEPHONE ENCOUNTER
Refill Decision Note   Lakia Wright  is requesting a refill authorization.  Brief Assessment and Rationale for Refill:  Approve     Medication Therapy Plan:         Comments:     Note composed:10:33 AM 06/03/2024

## 2024-06-24 ENCOUNTER — PATIENT MESSAGE (OUTPATIENT)
Dept: OBSTETRICS AND GYNECOLOGY | Facility: CLINIC | Age: 59
End: 2024-06-24

## 2024-06-24 RX ORDER — FLUOXETINE 10 MG/1
10 CAPSULE ORAL DAILY
Qty: 30 CAPSULE | Refills: 2 | Status: SHIPPED | OUTPATIENT
Start: 2024-06-24 | End: 2025-06-24

## 2024-07-25 ENCOUNTER — PATIENT MESSAGE (OUTPATIENT)
Dept: OBSTETRICS AND GYNECOLOGY | Facility: CLINIC | Age: 59
End: 2024-07-25

## 2024-07-25 ENCOUNTER — TELEPHONE (OUTPATIENT)
Dept: OBSTETRICS AND GYNECOLOGY | Facility: CLINIC | Age: 59
End: 2024-07-25

## 2024-07-25 DIAGNOSIS — R92.8 ABNORMAL MAMMOGRAM: Primary | ICD-10-CM

## 2024-07-25 NOTE — TELEPHONE ENCOUNTER
Pt sent message stating My recent mammogram exam on 7/15/24 requires additional mammographic views or ultrasound. [facility] will need a signed, written order from you to perform this exam. Please let me know when you have provided them with a written order so that I can schedule my appointment.   Pt sent over results of mammmogram from [facility]. Pt needs a left diagnostic mammogram and breast ultrasound. Orders placed and will fax to [facility]

## 2024-08-21 ENCOUNTER — OFFICE VISIT (OUTPATIENT)
Dept: FAMILY MEDICINE | Facility: CLINIC | Age: 59
End: 2024-08-21
Payer: COMMERCIAL

## 2024-08-21 VITALS
HEIGHT: 61 IN | DIASTOLIC BLOOD PRESSURE: 70 MMHG | WEIGHT: 127.88 LBS | TEMPERATURE: 98 F | SYSTOLIC BLOOD PRESSURE: 110 MMHG | OXYGEN SATURATION: 96 % | HEART RATE: 62 BPM | BODY MASS INDEX: 24.15 KG/M2

## 2024-08-21 DIAGNOSIS — Z00.00 ROUTINE GENERAL MEDICAL EXAMINATION AT A HEALTH CARE FACILITY: Primary | ICD-10-CM

## 2024-08-21 DIAGNOSIS — F32.A DEPRESSION, UNSPECIFIED DEPRESSION TYPE: ICD-10-CM

## 2024-08-21 PROCEDURE — 1159F MED LIST DOCD IN RCRD: CPT | Mod: CPTII,S$GLB,, | Performed by: FAMILY MEDICINE

## 2024-08-21 PROCEDURE — 3074F SYST BP LT 130 MM HG: CPT | Mod: CPTII,S$GLB,, | Performed by: FAMILY MEDICINE

## 2024-08-21 PROCEDURE — 99396 PREV VISIT EST AGE 40-64: CPT | Mod: S$GLB,,, | Performed by: FAMILY MEDICINE

## 2024-08-21 PROCEDURE — 3078F DIAST BP <80 MM HG: CPT | Mod: CPTII,S$GLB,, | Performed by: FAMILY MEDICINE

## 2024-08-21 PROCEDURE — 3008F BODY MASS INDEX DOCD: CPT | Mod: CPTII,S$GLB,, | Performed by: FAMILY MEDICINE

## 2024-08-21 PROCEDURE — 99999 PR PBB SHADOW E&M-EST. PATIENT-LVL IV: CPT | Mod: PBBFAC,,, | Performed by: FAMILY MEDICINE

## 2024-08-21 PROCEDURE — 1160F RVW MEDS BY RX/DR IN RCRD: CPT | Mod: CPTII,S$GLB,, | Performed by: FAMILY MEDICINE

## 2024-08-21 NOTE — PROGRESS NOTES
(Portions of this note were dictated using voice recognition software and may contain dictation related errors in spelling/grammar/syntax not found on text review)    CC:   Chief Complaint   Patient presents with    Annual Exam       HPI: 59 y.o. female     Here for annual exam    History of depression, was on Zoloft and Wellbutrin in the past but currently on fluoxetine 10 mg daily (from   OBGYN:).  Mood is well controlled     Occasional Flonase for allergies .  Added Astelin temporarily in 2023 given exacerbation.     Was on Climara patch for HRT emergently but not currently using , does have Estrace cream that she uses.    Diet fairly healthy    No specific exercise    Answers submitted by the patient for this visit:  Review of Systems Questionnaire (Submitted on 2024)  activity change: No  unexpected weight change: No  neck pain: No  hearing loss: No  rhinorrhea: No  trouble swallowing: No  eye discharge: No  visual disturbance: No  chest tightness: No  wheezing: No  chest pain: No  palpitations: No  blood in stool: No  constipation: No  vomiting: No  diarrhea: No  polydipsia: No  polyuria: No  difficulty urinating: No  hematuria: No  menstrual problem: No  dysuria: No  joint swelling: No  arthralgias: No  headaches: No  weakness: No  confusion: No  dysphoric mood: No      Past Medical History:   Diagnosis Date    Allergic rhinitis     Depression        Past Surgical History:   Procedure Laterality Date     SECTION, LOW TRANSVERSE      COLONOSCOPY N/A 2018    Procedure: COLONOSCOPY Clenpiq;  Surgeon: Maria Alejandra Laguna MD;  Location: [facility];  Service: Endoscopy;  Laterality: N/A;       Family History   Problem Relation Name Age of Onset    Coronary artery disease Paternal Grandfather      Diabetes Father      Hyperlipidemia Father      Hypertension Father      Stroke Father      Melanoma Neg Hx      Psoriasis Neg Hx      Lupus Neg Hx      Breast cancer Neg Hx      Colon cancer Neg Hx          Social History     Tobacco Use    Smoking status: Never     Passive exposure: Never    Smokeless tobacco: Never   Substance Use Topics    Alcohol use: Yes    Drug use: No       Lab Results   Component Value Date    WBC 7.58 12/10/2023    HGB 13.8 12/10/2023    HCT 41.9 12/10/2023    MCV 93 12/10/2023     12/10/2023    CHOL 227 (H) 07/17/2023    TRIG 84 07/17/2023    HDL 56 07/17/2023    ALT 28 12/10/2023    AST 29 12/10/2023    BILITOT 0.3 12/10/2023    ALKPHOS 56 12/10/2023     12/22/2023    K 4.2 12/22/2023     12/22/2023    CREATININE 0.64 12/22/2023    ESTGFRAFRICA >60 06/15/2022    EGFRNONAA >60 06/15/2022    CALCIUM 9.4 12/22/2023    ALBUMIN 4.5 12/10/2023    BUN 15 12/22/2023    CO2 26 12/22/2023    TSH 2.664 07/17/2023    HGBA1C 5.4 07/17/2023    LDLCALC 154.2 07/17/2023    GLU 94 12/22/2023             Vital signs reviewed  PE:   APPEARANCE: Well nourished, well developed, in no acute distress.    HEAD: Normocephalic, atraumatic.  No sinus tenderness  EYES: PERRL. EOMI.   Conjunctivae noninjected.  EARS: TM's intact. Light reflex normal. No retraction or perforation  MOUTH/THROAT:  Mild pharyngeal erythema with cobblestoning.  No exudate  NECK: Supple with no cervical lymphadenopathy.  No carotid bruits.  No thyromegaly  CHEST: Good inspiratory effort. Lungs clear to auscultation with no wheezes or crackles.  CARDIOVASCULAR: Normal S1, S2. No rubs, murmurs, or gallops.  ABDOMEN: Bowel sounds normal. Not distended. Soft. No tenderness or masses. No organomegaly.  EXTREMITIES: No edema, cyanosis, or clubbing.      IMPRESSION  1. Routine general medical examination at a health care facility    2. Depression, unspecified depression type              PLAN  Screening labs below.       Mood well controlled on fluoxetine from her gyn    Flonase as needed for allergies    Has biometric form for insurance, can fill out once labs are back     Orders Placed This Encounter   Procedures    CBC  Auto Differential    Comprehensive Metabolic Panel    Lipid Panel    TSH    Hemoglobin A1C         SCREENINGS  Immunizations  tdap 2018  Flu:     CoVID 02/26/2021, 03/19/2021, 12/21/21,  eligible  Zoster up-to-date    Pap: Dr. Bhagat  , gyn, plans to see Dr. Naranjo on 9/18/24    Mmg:  Up-to-date through her gyn.  DCIS screening on 07/15/2024, needed diagnostic and ultrasound, benign rtc 1 year.    Colon screen:  Colonoscopy 2018, internal hemorrhoids, diverticulosis left side of the colon.  1 diminutive polyp splenic flexure, recall 10 years

## 2024-08-24 ENCOUNTER — LAB VISIT (OUTPATIENT)
Dept: LAB | Facility: HOSPITAL | Age: 59
End: 2024-08-24
Attending: FAMILY MEDICINE
Payer: COMMERCIAL

## 2024-08-24 DIAGNOSIS — F32.A DEPRESSION, UNSPECIFIED DEPRESSION TYPE: ICD-10-CM

## 2024-08-24 DIAGNOSIS — Z00.00 ROUTINE GENERAL MEDICAL EXAMINATION AT A HEALTH CARE FACILITY: ICD-10-CM

## 2024-08-24 LAB
ALBUMIN SERPL BCP-MCNC: 3.7 G/DL (ref 3.5–5.2)
ALP SERPL-CCNC: 45 U/L (ref 55–135)
ALT SERPL W/O P-5'-P-CCNC: 10 U/L (ref 10–44)
ANION GAP SERPL CALC-SCNC: 8 MMOL/L (ref 8–16)
AST SERPL-CCNC: 15 U/L (ref 10–40)
BASOPHILS # BLD AUTO: 0.04 K/UL (ref 0–0.2)
BASOPHILS NFR BLD: 0.8 % (ref 0–1.9)
BILIRUB SERPL-MCNC: 0.5 MG/DL (ref 0.1–1)
BUN SERPL-MCNC: 14 MG/DL (ref 6–20)
CALCIUM SERPL-MCNC: 9.2 MG/DL (ref 8.7–10.5)
CHLORIDE SERPL-SCNC: 106 MMOL/L (ref 95–110)
CHOLEST SERPL-MCNC: 205 MG/DL (ref 120–199)
CHOLEST/HDLC SERPL: 3.7 {RATIO} (ref 2–5)
CO2 SERPL-SCNC: 27 MMOL/L (ref 23–29)
CREAT SERPL-MCNC: 0.8 MG/DL (ref 0.5–1.4)
DIFFERENTIAL METHOD BLD: ABNORMAL
EOSINOPHIL # BLD AUTO: 0.1 K/UL (ref 0–0.5)
EOSINOPHIL NFR BLD: 1.1 % (ref 0–8)
ERYTHROCYTE [DISTWIDTH] IN BLOOD BY AUTOMATED COUNT: 14.5 % (ref 11.5–14.5)
EST. GFR  (NO RACE VARIABLE): >60 ML/MIN/1.73 M^2
ESTIMATED AVG GLUCOSE: 114 MG/DL (ref 68–131)
GLUCOSE SERPL-MCNC: 94 MG/DL (ref 70–110)
HBA1C MFR BLD: 5.6 % (ref 4–5.6)
HCT VFR BLD AUTO: 43.3 % (ref 37–48.5)
HDLC SERPL-MCNC: 55 MG/DL (ref 40–75)
HDLC SERPL: 26.8 % (ref 20–50)
HGB BLD-MCNC: 14.3 G/DL (ref 12–16)
IMM GRANULOCYTES # BLD AUTO: 0.01 K/UL (ref 0–0.04)
IMM GRANULOCYTES NFR BLD AUTO: 0.2 % (ref 0–0.5)
LDLC SERPL CALC-MCNC: 130.8 MG/DL (ref 63–159)
LYMPHOCYTES # BLD AUTO: 1.3 K/UL (ref 1–4.8)
LYMPHOCYTES NFR BLD: 25.1 % (ref 18–48)
MCH RBC QN AUTO: 31.1 PG (ref 27–31)
MCHC RBC AUTO-ENTMCNC: 33 G/DL (ref 32–36)
MCV RBC AUTO: 94 FL (ref 82–98)
MONOCYTES # BLD AUTO: 0.3 K/UL (ref 0.3–1)
MONOCYTES NFR BLD: 5.4 % (ref 4–15)
NEUTROPHILS # BLD AUTO: 3.6 K/UL (ref 1.8–7.7)
NEUTROPHILS NFR BLD: 67.4 % (ref 38–73)
NONHDLC SERPL-MCNC: 150 MG/DL
NRBC BLD-RTO: 0 /100 WBC
PLATELET # BLD AUTO: 243 K/UL (ref 150–450)
PMV BLD AUTO: 10.7 FL (ref 9.2–12.9)
POTASSIUM SERPL-SCNC: 4.1 MMOL/L (ref 3.5–5.1)
PROT SERPL-MCNC: 6.5 G/DL (ref 6–8.4)
RBC # BLD AUTO: 4.6 M/UL (ref 4–5.4)
SODIUM SERPL-SCNC: 141 MMOL/L (ref 136–145)
TRIGL SERPL-MCNC: 96 MG/DL (ref 30–150)
TSH SERPL DL<=0.005 MIU/L-ACNC: 1.8 UIU/ML (ref 0.4–4)
WBC # BLD AUTO: 5.33 K/UL (ref 3.9–12.7)

## 2024-08-24 PROCEDURE — 80061 LIPID PANEL: CPT | Performed by: FAMILY MEDICINE

## 2024-08-24 PROCEDURE — 84443 ASSAY THYROID STIM HORMONE: CPT | Performed by: FAMILY MEDICINE

## 2024-08-24 PROCEDURE — 85025 COMPLETE CBC W/AUTO DIFF WBC: CPT | Performed by: FAMILY MEDICINE

## 2024-08-24 PROCEDURE — 80053 COMPREHEN METABOLIC PANEL: CPT | Performed by: FAMILY MEDICINE

## 2024-08-24 PROCEDURE — 83036 HEMOGLOBIN GLYCOSYLATED A1C: CPT | Performed by: FAMILY MEDICINE

## 2024-08-27 ENCOUNTER — PATIENT MESSAGE (OUTPATIENT)
Dept: FAMILY MEDICINE | Facility: CLINIC | Age: 59
End: 2024-08-27
Payer: COMMERCIAL

## 2024-09-18 ENCOUNTER — OFFICE VISIT (OUTPATIENT)
Dept: OBSTETRICS AND GYNECOLOGY | Facility: CLINIC | Age: 59
End: 2024-09-18
Attending: OBSTETRICS & GYNECOLOGY
Payer: COMMERCIAL

## 2024-09-18 VITALS
SYSTOLIC BLOOD PRESSURE: 119 MMHG | WEIGHT: 123 LBS | DIASTOLIC BLOOD PRESSURE: 71 MMHG | HEIGHT: 61 IN | BODY MASS INDEX: 23.22 KG/M2

## 2024-09-18 DIAGNOSIS — N95.1 MENOPAUSAL SYNDROME: ICD-10-CM

## 2024-09-18 DIAGNOSIS — Z79.890 POSTMENOPAUSAL HORMONE REPLACEMENT THERAPY: ICD-10-CM

## 2024-09-18 DIAGNOSIS — N95.0 PMB (POSTMENOPAUSAL BLEEDING): ICD-10-CM

## 2024-09-18 DIAGNOSIS — N95.2 POSTMENOPAUSAL ATROPHIC VAGINITIS: ICD-10-CM

## 2024-09-18 DIAGNOSIS — Z01.411 ENCOUNTER FOR GYNECOLOGICAL EXAMINATION WITH ABNORMAL FINDING: Primary | ICD-10-CM

## 2024-09-18 PROCEDURE — 1159F MED LIST DOCD IN RCRD: CPT | Mod: CPTII,S$GLB,, | Performed by: OBSTETRICS & GYNECOLOGY

## 2024-09-18 PROCEDURE — 99396 PREV VISIT EST AGE 40-64: CPT | Mod: S$GLB,,, | Performed by: OBSTETRICS & GYNECOLOGY

## 2024-09-18 PROCEDURE — 3008F BODY MASS INDEX DOCD: CPT | Mod: CPTII,S$GLB,, | Performed by: OBSTETRICS & GYNECOLOGY

## 2024-09-18 PROCEDURE — 3074F SYST BP LT 130 MM HG: CPT | Mod: CPTII,S$GLB,, | Performed by: OBSTETRICS & GYNECOLOGY

## 2024-09-18 PROCEDURE — 3044F HG A1C LEVEL LT 7.0%: CPT | Mod: CPTII,S$GLB,, | Performed by: OBSTETRICS & GYNECOLOGY

## 2024-09-18 PROCEDURE — 99999 PR PBB SHADOW E&M-EST. PATIENT-LVL III: CPT | Mod: PBBFAC,,, | Performed by: OBSTETRICS & GYNECOLOGY

## 2024-09-18 PROCEDURE — 3078F DIAST BP <80 MM HG: CPT | Mod: CPTII,S$GLB,, | Performed by: OBSTETRICS & GYNECOLOGY

## 2024-09-18 RX ORDER — ESTRADIOL 1 MG/1
1 TABLET ORAL DAILY
Qty: 90 TABLET | Refills: 3 | Status: SHIPPED | OUTPATIENT
Start: 2024-09-18 | End: 2025-09-18

## 2024-09-18 RX ORDER — ESTRADIOL 0.1 MG/G
1 CREAM VAGINAL
Qty: 43 G | Refills: 3 | Status: SHIPPED | OUTPATIENT
Start: 2024-09-19

## 2024-09-18 RX ORDER — PROGESTERONE 100 MG/1
100 CAPSULE ORAL NIGHTLY
Qty: 90 CAPSULE | Refills: 3 | Status: SHIPPED | OUTPATIENT
Start: 2024-09-18 | End: 2025-09-18

## 2024-09-18 NOTE — PROGRESS NOTES
Subjective:       Patient ID: Lakia Wright is a 59 y.o. female.    Chief Complaint:  Well Woman and Gynecologic Exam      History of Present Illness  Gynecologic Exam  Pertinent negatives include no abdominal pain, back pain or headaches.       Lakia Wright is a 59 y.o. female  here for her annual GYN exam.  She Is menopausal since about age 49, and began HRT Dec 2023 primarily due to low libido. Now reports spotting last week.   Current HRT:   Estradiol 1mg  Prometrium 100mg  Testosterone2% gel 0..25 daily  Vaginal estradiol cream twice weekly    reports break through bleeding.   denies vaginal itching or irritation.  Denies vaginal discharge.  She is sexually active. She has had 1 partner for 30 years .     History of abnormal pap: Yes - years ago  Last Pap: approximate date 2022 and was normal(done at [facility] with Dr. Bhagat)  Last MMG: 2024 at [facility], states had additional studies done- Normal  Last Colonoscopy:  colonoscopy 6 years ago without abnormalities.  denies domestic violence. She does feel safe at home.     Past Medical History:   Diagnosis Date    Allergic rhinitis     Depression      Past Surgical History:   Procedure Laterality Date     SECTION, LOW TRANSVERSE      COLONOSCOPY N/A 2018    Procedure: COLONOSCOPY Clenpiq;  Surgeon: Maria Alejandra Laguna MD;  Location: [facility];  Service: Endoscopy;  Laterality: N/A;     Social History     Socioeconomic History    Marital status:     Number of children: 2   Tobacco Use    Smoking status: Never     Passive exposure: Never    Smokeless tobacco: Never   Substance and Sexual Activity    Alcohol use: Yes    Drug use: No    Sexual activity: Yes     Partners: Male     Comment: , together since    Social History Narrative    Tries to   do exercise regularly but has not recently    Tries to eat healthy diet but has been slacked off recently         Social Determinants of Health     Financial  "Resource Strain: Low Risk  (2024)    Overall Financial Resource Strain (CARDIA)     Difficulty of Paying Living Expenses: Not hard at all   Food Insecurity: No Food Insecurity (2024)    Hunger Vital Sign     Worried About Running Out of Food in the Last Year: Never true     Ran Out of Food in the Last Year: Never true   Physical Activity: Inactive (2024)    Exercise Vital Sign     Days of Exercise per Week: 0 days     Minutes of Exercise per Session: 30 min   Stress: Stress Concern Present (2024)    [employer] [place] [employer] - Occupational Stress Questionnaire     Feeling of Stress : Rather much   Housing Stability: Unknown (2024)    Housing Stability Vital Sign     Unable to Pay for Housing in the Last Year: No     Family History   Problem Relation Name Age of Onset    Coronary artery disease Paternal Grandfather      Diabetes Father      Hyperlipidemia Father      Hypertension Father      Stroke Father      Melanoma Neg Hx      Psoriasis Neg Hx      Lupus Neg Hx      Breast cancer Neg Hx      Colon cancer Neg Hx       OB History          2    Para   2    Term   2            AB        Living   2         SAB        IAB        Ectopic        Multiple        Live Births   2                 /71   Ht 5' 1" (1.549 m)   Wt 55.8 kg (123 lb)   LMP 2014 (Approximate)   BMI 23.24 kg/m²         GYN & OB History  Patient's last menstrual period was 2014 (approximate).   Date of Last Pap: No result found    OB History    Para Term  AB Living   2 2 2     2   SAB IAB Ectopic Multiple Live Births           2      # Outcome Date GA Lbr Kaz/2nd Weight Sex Type Anes PTL Lv   2 Term      CS-LTranv   NADEGE   1 Term      CS-LTranv   NADEGE       Review of Systems  Review of Systems   Constitutional:  Negative for activity change, appetite change, fatigue and unexpected weight change.   HENT: Negative.     Eyes:  Negative for visual " disturbance.   Respiratory:  Negative for shortness of breath and wheezing.    Cardiovascular:  Negative for chest pain, palpitations and leg swelling.   Gastrointestinal:  Negative for abdominal pain, bloating and blood in stool.   Endocrine: Negative for diabetes and hair loss.   Genitourinary:  Positive for postmenopausal bleeding. Negative for decreased libido, dyspareunia and hot flashes.   Musculoskeletal:  Negative for back pain and joint swelling.   Integumentary:  Negative for acne, hair changes and nipple discharge.   Neurological:  Negative for headaches.   Hematological:  Does not bruise/bleed easily.   Psychiatric/Behavioral:  Negative for depression and sleep disturbance. The patient is not nervous/anxious.    Breast: Negative for mastodynia and nipple discharge          Objective:      Physical Exam:   Constitutional: She is oriented to person, place, and time. She appears well-developed and well-nourished.    HENT:   Head: Normocephalic and atraumatic.    Eyes: Pupils are equal, round, and reactive to light. EOM are normal.     Cardiovascular:  Normal rate and regular rhythm.             Pulmonary/Chest: Effort normal and breath sounds normal.   BREASTS:  no mass, no tenderness, no deformity and no retraction. Right breast exhibits no inverted nipple, no mass, no nipple discharge, no skin change, no tenderness, no bleeding and no swelling. Left breast exhibits no inverted nipple, no mass, no nipple discharge, no skin change, no tenderness, no bleeding and no swelling. Breasts are symmetrical.              Abdominal: Soft. Bowel sounds are normal.     Genitourinary:    Pelvic exam was performed with patient supine.      Genitourinary Comments: PELVIC: Normal external genitalia without lesions.  Normal hair distribution.  Adequate perineal body, normal urethral meatus.  Vagina  Moderately rugated, Mildly atrophic, without lesions or discharge.  Cervix pink,  Nulliparous appearing, without lesions,  or  tenderness.  No significant cystocele or rectocele.  Bimanual exam shows uterus to be normal size, regular, mobile and nontender. Small amount dark brown spotting. Adnexa without masses or tenderness.    RECTAL:Deferred                 Musculoskeletal: Normal range of motion and moves all extremeties.       Neurological: She is alert and oriented to person, place, and time.    Skin: Skin is warm and dry.    Psychiatric: She has a normal mood and affect.              Assessment:        1. Encounter for gynecological examination with abnormal finding    2. PMB (postmenopausal bleeding)    3. Postmenopausal hormone replacement therapy    4. Menopausal syndrome    5. Postmenopausal atrophic vaginitis                Plan:        Problem List Items Addressed This Visit    None  Visit Diagnoses       Encounter for gynecological examination with abnormal finding    -  Primary    PMB (postmenopausal bleeding)      (Discussed may need Endometrial sampling)    Relevant Orders    US Pelvis Comp with Transvag NON-OB (xpd    US Pelvis Comp with Transvag NON-OB (xpd    Postmenopausal hormone replacement therapy        Menopausal syndrome        Relevant Medications    estradioL (ESTRACE) 1 MG tablet    progesterone (PROMETRIUM) 100 MG capsule    UNABLE TO FIND:Apply 0.25 g topically Daily. Testosterone 2% gel     Postmenopausal atrophic vaginitis        Relevant Medications    estradioL (ESTRACE) 0.01 % (0.1 mg/gram) vaginal cream (Start on 9/19/2024)             Follow up in about 1 year (around 9/18/2025).

## 2024-09-23 RX ORDER — FLUOXETINE 10 MG/1
10 CAPSULE ORAL
Qty: 30 CAPSULE | Refills: 0 | Status: SHIPPED | OUTPATIENT
Start: 2024-09-23

## 2024-09-25 ENCOUNTER — PATIENT MESSAGE (OUTPATIENT)
Dept: OBSTETRICS AND GYNECOLOGY | Facility: CLINIC | Age: 59
End: 2024-09-25
Payer: COMMERCIAL

## 2024-09-25 DIAGNOSIS — R92.8 ABNORMAL MAMMOGRAM: Primary | ICD-10-CM

## 2024-09-26 NOTE — TELEPHONE ENCOUNTER
Called patient to discuss U/S done at [facility].     Endometrial stripe thickened in the fundus, measuring 1.2 cm.    Advised will need endometrial sampling and if sample not adequate , will need hysteroscopy.

## 2024-09-30 ENCOUNTER — TELEPHONE (OUTPATIENT)
Dept: OBSTETRICS AND GYNECOLOGY | Facility: CLINIC | Age: 59
End: 2024-09-30
Payer: COMMERCIAL

## 2024-09-30 DIAGNOSIS — N88.2 CERVICAL STENOSIS (UTERINE CERVIX): Primary | ICD-10-CM

## 2024-09-30 RX ORDER — MISOPROSTOL 200 UG/1
200 TABLET ORAL EVERY 12 HOURS
Qty: 2 TABLET | Refills: 0 | Status: SHIPPED | OUTPATIENT
Start: 2024-09-30

## 2024-09-30 NOTE — TELEPHONE ENCOUNTER
Called patient and scheduled her for a biopsy with MOE Mancera NP on 10/09/24 at 3:40 pm. Patient said thanks.

## 2024-10-01 ENCOUNTER — PROCEDURE VISIT (OUTPATIENT)
Dept: OBSTETRICS AND GYNECOLOGY | Facility: CLINIC | Age: 59
End: 2024-10-01
Attending: OBSTETRICS & GYNECOLOGY
Payer: COMMERCIAL

## 2024-10-01 VITALS
BODY MASS INDEX: 23.41 KG/M2 | WEIGHT: 124 LBS | DIASTOLIC BLOOD PRESSURE: 64 MMHG | HEIGHT: 61 IN | SYSTOLIC BLOOD PRESSURE: 109 MMHG

## 2024-10-01 DIAGNOSIS — N95.0 PMB (POSTMENOPAUSAL BLEEDING): Primary | ICD-10-CM

## 2024-10-01 PROCEDURE — 88305 TISSUE EXAM BY PATHOLOGIST: CPT | Performed by: PATHOLOGY

## 2024-10-01 PROCEDURE — 88305 TISSUE EXAM BY PATHOLOGIST: CPT | Mod: 26,,, | Performed by: PATHOLOGY

## 2024-10-01 NOTE — PROCEDURES
Endometrial biopsy    Date/Time: 10/1/2024 10:30 AM    Performed by: Bela Naranjo MD  Authorized by: Bela Naranjo MD    Consent:     Consent obtained:  Prior to procedure the appropriate consent was completed and verified    Consent given by:  Patient    Patient questions answered: yes      Patient agrees, verbalizes understanding, and wants to proceed: yes      Educational handouts given: yes      Instructions and paperwork completed: yes    Indication:     Indications: Post-menopausal bleeding      Chronicity of post-menopausal bleeding:  New    Progression of post-menopausal bleeding:  Partially resolved  Pre-procedure:     Pre-procedure timeout performed: yes    Procedure:     Procedure: endometrial biopsy with Pipelle      Cervix cleaned and prepped: yes      A paracervical block was performed: yes (topical 2% lidocaine, followed by paracervical block)      An intracervical block was performed: no      The cervix was dilated: no      Uterus sounded: yes      Uterus sound depth (cm):  8    Specimen collected: specimen collected and sent to pathology      Patient tolerated procedure well with no complications: yes

## 2024-10-03 LAB
FINAL PATHOLOGIC DIAGNOSIS: NORMAL
GROSS: NORMAL
Lab: NORMAL

## 2024-10-08 ENCOUNTER — TELEPHONE (OUTPATIENT)
Dept: OBSTETRICS AND GYNECOLOGY | Facility: CLINIC | Age: 59
End: 2024-10-08
Payer: COMMERCIAL

## 2024-10-08 DIAGNOSIS — N95.0 PMB (POSTMENOPAUSAL BLEEDING): Primary | ICD-10-CM

## 2024-10-08 NOTE — TELEPHONE ENCOUNTER
----- Message from Bela Naranjo MD sent at 10/8/2024  1:48 PM CDT -----  Please call to schedule preops, I have scheduled for Hysteroscopy with D&C for Nov 15.

## 2024-10-08 NOTE — TELEPHONE ENCOUNTER
Called and discussed with patient need to sample upper portion of endometrium for definitive dx. Scheduled for Hysteroscopy with D&C for Nov 15.

## 2024-11-11 ENCOUNTER — ANESTHESIA EVENT (OUTPATIENT)
Dept: SURGERY | Facility: OTHER | Age: 59
End: 2024-11-11
Payer: COMMERCIAL

## 2024-11-11 RX ORDER — LIDOCAINE HYDROCHLORIDE 10 MG/ML
0.5 INJECTION, SOLUTION EPIDURAL; INFILTRATION; INTRACAUDAL; PERINEURAL ONCE
Status: CANCELLED | OUTPATIENT
Start: 2024-11-11 | End: 2024-11-11

## 2024-11-11 RX ORDER — SODIUM CHLORIDE, SODIUM LACTATE, POTASSIUM CHLORIDE, CALCIUM CHLORIDE 600; 310; 30; 20 MG/100ML; MG/100ML; MG/100ML; MG/100ML
INJECTION, SOLUTION INTRAVENOUS CONTINUOUS
Status: CANCELLED | OUTPATIENT
Start: 2024-11-11

## 2024-11-11 RX ORDER — ACETAMINOPHEN 500 MG
1000 TABLET ORAL
Status: CANCELLED | OUTPATIENT
Start: 2024-11-11 | End: 2024-11-11

## 2024-11-12 ENCOUNTER — HOSPITAL ENCOUNTER (OUTPATIENT)
Dept: PREADMISSION TESTING | Facility: OTHER | Age: 59
Discharge: HOME | End: 2024-11-12
Attending: OBSTETRICS & GYNECOLOGY
Payer: COMMERCIAL

## 2024-11-12 ENCOUNTER — OFFICE VISIT (OUTPATIENT)
Dept: OBSTETRICS AND GYNECOLOGY | Facility: CLINIC | Age: 59
End: 2024-11-12
Attending: OBSTETRICS & GYNECOLOGY
Payer: COMMERCIAL

## 2024-11-12 VITALS
OXYGEN SATURATION: 95 % | DIASTOLIC BLOOD PRESSURE: 59 MMHG | BODY MASS INDEX: 23.6 KG/M2 | HEART RATE: 66 BPM | HEIGHT: 61 IN | RESPIRATION RATE: 16 BRPM | WEIGHT: 125 LBS | TEMPERATURE: 98 F | SYSTOLIC BLOOD PRESSURE: 105 MMHG

## 2024-11-12 VITALS
HEIGHT: 61 IN | DIASTOLIC BLOOD PRESSURE: 64 MMHG | WEIGHT: 125.38 LBS | SYSTOLIC BLOOD PRESSURE: 100 MMHG | BODY MASS INDEX: 23.67 KG/M2 | HEART RATE: 69 BPM

## 2024-11-12 DIAGNOSIS — R93.89 THICKENED ENDOMETRIUM: ICD-10-CM

## 2024-11-12 DIAGNOSIS — Z01.818 PREOPERATIVE EXAM FOR GYNECOLOGIC SURGERY: Primary | ICD-10-CM

## 2024-11-12 DIAGNOSIS — N95.0 PMB (POSTMENOPAUSAL BLEEDING): ICD-10-CM

## 2024-11-12 DIAGNOSIS — N88.2 CERVICAL STENOSIS (UTERINE CERVIX): ICD-10-CM

## 2024-11-12 PROCEDURE — 3074F SYST BP LT 130 MM HG: CPT | Mod: CPTII,S$GLB,, | Performed by: OBSTETRICS & GYNECOLOGY

## 2024-11-12 PROCEDURE — 3044F HG A1C LEVEL LT 7.0%: CPT | Mod: CPTII,S$GLB,, | Performed by: OBSTETRICS & GYNECOLOGY

## 2024-11-12 PROCEDURE — 99213 OFFICE O/P EST LOW 20 MIN: CPT | Mod: S$GLB,,, | Performed by: OBSTETRICS & GYNECOLOGY

## 2024-11-12 PROCEDURE — 3078F DIAST BP <80 MM HG: CPT | Mod: CPTII,S$GLB,, | Performed by: OBSTETRICS & GYNECOLOGY

## 2024-11-12 PROCEDURE — 3008F BODY MASS INDEX DOCD: CPT | Mod: CPTII,S$GLB,, | Performed by: OBSTETRICS & GYNECOLOGY

## 2024-11-12 PROCEDURE — 99999 PR PBB SHADOW E&M-EST. PATIENT-LVL III: CPT | Mod: PBBFAC,,, | Performed by: OBSTETRICS & GYNECOLOGY

## 2024-11-12 RX ORDER — SODIUM CHLORIDE 9 MG/ML
INJECTION, SOLUTION INTRAVENOUS CONTINUOUS
Status: CANCELLED | OUTPATIENT
Start: 2024-11-15

## 2024-11-12 RX ORDER — MUPIROCIN 20 MG/G
OINTMENT TOPICAL
Status: CANCELLED | OUTPATIENT
Start: 2024-11-15

## 2024-11-12 RX ORDER — FAMOTIDINE 20 MG/1
20 TABLET, FILM COATED ORAL
Status: CANCELLED | OUTPATIENT
Start: 2024-11-15

## 2024-11-12 RX ORDER — FLUOXETINE HYDROCHLORIDE 20 MG/1
20 CAPSULE ORAL
COMMUNITY
Start: 2024-11-07

## 2024-11-12 RX ORDER — MISOPROSTOL 200 UG/1
200 TABLET ORAL EVERY 12 HOURS
Qty: 2 TABLET | Refills: 0 | Status: ON HOLD | OUTPATIENT
Start: 2024-11-12 | End: 2024-11-15 | Stop reason: HOSPADM

## 2024-11-12 NOTE — ANESTHESIA PREPROCEDURE EVALUATION
11/12/2024  Lakia Wright is a 59 y.o., female.      Pre-op Assessment    I have reviewed the Patient Summary Reports.     I have reviewed the Nursing Notes. I have reviewed the NPO Status.   I have reviewed the Medications.     Review of Systems  Anesthesia Hx:  No problems with previous Anesthesia             Denies Family Hx of Anesthesia complications.    Denies Personal Hx of Anesthesia complications.                    Social:  Non-Smoker       Hematology/Oncology:  Hematology Normal   Oncology Normal                                   EENT/Dental:  EENT/Dental Normal           Cardiovascular:  Cardiovascular Normal                                              Pulmonary:  Pulmonary Normal                       Renal/:  Renal/ Normal                 Hepatic/GI:  Hepatic/GI Normal                    Musculoskeletal:  Musculoskeletal Normal                Neurological:  Neurology Normal                                      Endocrine:  Endocrine Normal            Dermatological:  Skin Normal    Psych:  Psychiatric Normal                    Physical Exam  General: Well nourished and Alert    Airway:  Mallampati: II   Mouth Opening: Normal  Tongue: Normal    Dental:  Intact        Anesthesia Plan  Type of Anesthesia, risks & benefits discussed:    Anesthesia Type: Gen Supraglottic Airway  Intra-op Monitoring Plan: Standard ASA Monitors  Post Op Pain Control Plan: multimodal analgesia  Induction:  IV  Airway Plan: Video  Informed Consent: Informed consent signed with the Patient and all parties understand the risks and agree with anesthesia plan.  All questions answered.   ASA Score: 2  Anesthesia Plan Notes: Labs in Epic    Ready For Surgery From Anesthesia Perspective.     .

## 2024-11-12 NOTE — DISCHARGE INSTRUCTIONS
Information to Prepare you for your Surgery    PRE-ADMIT TESTING   [address]  [place] [address]  [address]   [phone]  - SUNITA López    Your surgery has been scheduled at [facility]. We are pleased to have the opportunity to serve you. For Further Information please call [phone].    On the day of surgery please report to Registration on the 1st floor of the [facility].    CONTACT YOUR PHYSICIAN'S OFFICE THE DAY PRIOR TO YOUR SURGERY TO OBTAIN YOUR ARRIVAL TIME.     The evening before surgery do not eat anything after 9 p.m. ( this includes hard candy, chewing gum and mints).  You may only have GATORADE, POWERADE AND WATER  from 9 p.m. until you leave your home.     DRINK AT LEAST 12 OUNCES THE MORNING OF SURGERY    DO NOT DRINK ANY LIQUIDS ON THE WAY TO THE HOSPITAL.      Why does your anesthesiologist allow you to drink Gatorade/Powerade before surgery?    Gatorade/Powerade helps to increase your comfort before surgery and to decrease your nausea after surgery.  The carbohydrates in the Gatorade/Powerade help reduce your body's stress response to surgery.  If you are diabetic, drink only water prior to surgery.       Outpatient Surgery- May allow 2 adults (18 and older)/ Support Persons (1 being the designated ) for all surgical/procedural patients. A breastfeeding mother will be allowed her infant and 2 adult Support Persons. No one under the age of 18 will be allowed in the building.    MEDICATION INSTRUCTIONS: TAKE medications checked off by the Anesthesiologist on your Medication List.      Angiogram Patients: Take medications as instructed by your physician, including aspirin.     Surgery Patients:  If you take ASPIRIN - Your PHYSICIAN/SURGEON will need to inform you IF/OR when you need to stop taking aspirin prior to your surgery.     Starting the week prior to surgery, do not take any medications containing IBUPROFEN or NSAIDS (Advil,  Aleve, BC, Celebrex, Goody's, Ketorolac, Meloxicam, Mobic, Motrin, Naproxen, Toradol, etc).  If you are not sure if you should take a medicine please call your surgeon's office.  You may take Tylenol.    Do Not Wear any make-up (especially eye make-up) to surgery. Please remove any false eyelashes or eyelash extensions. If you arrive the day of surgery with makeup/eyelashes on you will be required to remove prior to surgery. (There is a risk of corneal abrasions if eye makeup/eyelash extensions are not removed)    Leave all valuables at home.   Do Not wear any jewelry or watches, including any metal in body piercings. Jewelry must be removed prior to coming to the hospital.  There is a possibility that rings that are unable to be removed may be cut off if they are on the surgical extremity.    Please remove all hair extensions, wigs, clips and any other metal accessories/ ornaments from your hair.  These items may pose a flammable/fire risk in Surgery and must be removed.    Do not shave your surgical area at least 5 days prior to your surgery. The surgical prep will be performed at the hospital according to Infection Control regulations.    Contact Lens must be removed before surgery. Either do not wear the contact lens or bring a case and solution for storage.  Please bring a container for eyeglasses or dentures as required.  Bring any paperwork your physician has provided, such as consent forms,  history and physicals, doctor's orders, etc.   Bring comfortable clothes that are loose fitting to wear upon discharge. Take into consideration the type of surgery being performed.  Maintain your diet as advised per your physician the day prior to surgery.    Adequate rest the night before surgery is advised.   Park in the Parking lot behind the hospital or in the [employer] [facility] across the street from the parking lot. Parking is complimentary.  If you will be discharged the same day as your procedure, please  arrange for a responsible adult to drive you home or to accompany you if traveling by taxi.   YOU WILL NOT BE PERMITTED TO DRIVE OR TO LEAVE THE HOSPITAL ALONE AFTER SURGERY.   If you are being discharged the same day, it is strongly recommended that you arrange for someone to remain with you for the first 24 hrs following your surgery.    The Surgeon will speak to your family/visitor after your surgery regarding the outcome of your surgery and post op care.  The Surgeon may speak to you after your surgery, but there is a possibility you may not remember the details.  Please check with your family members regarding the conversation with the Surgeon.         Bathing Instructions with Hibiclens:    Shower the evening before and morning of your procedure with Chlorhexidine (Hibiclens)  do not use Chlorhexidine on your face or genitals. Do not get in your eyes.  Wash your face with water and your regular face wash/soap  Use your regular shampoo  Apply Chlorhexidine (Hibiclens) directly on your skin or on a wet washcloth and wash gently. When showering: Move away from the shower stream when applying Chlorhexidine (Hibiclens) to avoid rinsing off too soon.  Rinse thoroughly with warm water  Do not dilute Chlorhexidine (Hibiclens)   Dry off as usual, do not use any deodorant, powder, body lotions, perfume, after shave or cologne.     We strongly recommend whoever is bringing you home be present for discharge instructions.  This will ensure a thorough understanding for your post op home care.    If the patient has fever, cough, or signs/symptoms of Flu or Covid please do not come in for your surgery.  First, contact the pre op department at [phone] (unit opens at 5AM) and then contact your surgeon and your primary care physician for further instructions.      If applicable, please bring any blood pressure and/or diabetes medications with you the day of surgery.  If on home oxygen, even at night, please bring your  portable oxygen tank with you the day of surgery in case it is needed for your discharge.      Thanks,  SUNITA López

## 2024-11-12 NOTE — PROGRESS NOTES
C.C Pre-op Exam      HPI : Lakia Wright is a 59 y.o. female  female who presents for preop exam for a Hysteroscopy with D&C scheduled on November 15 for PMB and thickened endometrium near the fundus.. Previously had an EMB which was insufficient.(Small fragments of inactive endometrium) .     Past Medical History:   Diagnosis Date    Allergic rhinitis     Depression      Past Surgical History:   Procedure Laterality Date     SECTION, LOW TRANSVERSE      COLONOSCOPY N/A 2018    Procedure: COLONOSCOPY Clenpiq;  Surgeon: Maria Alejandra Laguna MD;  Location: [facility];  Service: Endoscopy;  Laterality: N/A;     Family History   Problem Relation Name Age of Onset    Coronary artery disease Paternal Grandfather      Diabetes Father      Hyperlipidemia Father      Hypertension Father      Stroke Father      Melanoma Neg Hx      Psoriasis Neg Hx      Lupus Neg Hx      Breast cancer Neg Hx      Colon cancer Neg Hx       OB History          2    Para   2    Term   2            AB        Living   2         SAB        IAB        Ectopic        Multiple        Live Births   2               Review of patient's allergies indicates:   Allergen Reactions    Grass pollen- grass standard        Current Outpatient Medications:     estradioL (ESTRACE) 0.01 % (0.1 mg/gram) vaginal cream, Place 1 g vaginally twice a week., Disp: 43 g, Rfl: 3    estradioL (ESTRACE) 1 MG tablet, Take 1 tablet (1 mg total) by mouth once daily., Disp: 90 tablet, Rfl: 3    FLUoxetine 20 MG capsule, Take 20 mg by mouth., Disp: , Rfl:     fluticasone propionate (FLONASE) 50 mcg/actuation nasal spray, 1 spray (50 mcg total) by Each Nostril route once daily., Disp: 16 g, Rfl: 3    progesterone (PROMETRIUM) 100 MG capsule, Take 1 capsule (100 mg total) by mouth nightly., Disp: 90 capsule, Rfl: 3    tretinoin (RETIN-A) 0.05 % cream, , Disp: , Rfl:     UNABLE TO FIND, Apply 0.25 g topically Daily. Testosterone 2% gel,  "Disp: 30 g, Rfl: 2    miSOPROStoL (CYTOTEC) 200 MCG Tab, Take 1 tablet (200 mcg total) by mouth every 12 (twelve) hours. preprocedure, Disp: 2 tablet, Rfl: 0  Social History     Socioeconomic History    Marital status:     Number of children: 2   Tobacco Use    Smoking status: Never     Passive exposure: Never    Smokeless tobacco: Never   Substance and Sexual Activity    Alcohol use: Yes     Comment: occ    Drug use: No    Sexual activity: Yes     Partners: Male     Comment: , together since 1993   Social History Narrative    Tries to   do exercise regularly but has not recently    Tries to eat healthy diet but has been slacked off recently         Social Drivers of Health     Financial Resource Strain: Low Risk  (8/20/2024)    Overall Financial Resource Strain (CARDIA)     Difficulty of Paying Living Expenses: Not hard at all   Food Insecurity: No Food Insecurity (8/20/2024)    Hunger Vital Sign     Worried About Running Out of Food in the Last Year: Never true     Ran Out of Food in the Last Year: Never true   Physical Activity: Inactive (8/20/2024)    Exercise Vital Sign     Days of Exercise per Week: 0 days     Minutes of Exercise per Session: 30 min   Stress: Stress Concern Present (8/20/2024)    [employer] [place] [employer] - Occupational Stress Questionnaire     Feeling of Stress : Rather much   Housing Stability: Unknown (8/20/2024)    Housing Stability Vital Sign     Unable to Pay for Housing in the Last Year: No       Last pap 7-: Normal  Last pathology 10-1-2024: Small fragments of inactive endometrium, no atypical hyperplasia or malignancy identified.   Last ultrasound done at [facility]: Endometrial stripe thickened in the fundus, measuring 1.2 cm.     /64   Pulse 69   Ht 5' 1" (1.549 m)   Wt 56.9 kg (125 lb 6.4 oz)   LMP 11/04/2014 (Approximate)   BMI 23.69 kg/m²     ROS:    GENERAL: Denies weight gain or weight loss. Feeling well overall.   SKIN: Denies rash or " lesions.   HEAD: Denies head injury or headache.   NODES: Denies enlarged lymph nodes.   CHEST: Denies chest pain or shortness of breath.   CARDIOVASCULAR: Denies palpitations or left sided chest pain.   ABDOMEN: No abdominal pain, constipation, diarrhea, nausea, vomiting or rectal bleeding.   URINARY: No frequency, dysuria, hematuria, or burning on urination.  REPRODUCTIVE: See HPI.   BREASTS: The patient performs breast self-examination and denies pain, lumps, or nipple discharge.   HEMATOLOGIC: No easy bruisability or excessive bleeding with the exception of menstrual cycles.  MUSCULOSKELETAL: Denies joint pain or swelling.   NEUROLOGIC: Denies syncope or weakness.   PSYCHIATRIC: Denies depression, anxiety or mood swings.    PHYSICAL EXAM:    APPEARANCE: Well nourished, well developed, in no acute distress.  AFFECT: WNL, alert and oriented x 3  SKIN: No acne or hirsutism  NECK: Neck symmetric without masses or thyromegaly  NODES: No inguinal, cervical, axillary, or femoral lymph node enlargement  CHEST: Good respiratory effect  ABDOMEN: Soft.  No tenderness or masses.  No hepatosplenomegaly.  No hernias.  PELVIC: Normal external genitalia without lesions.  Normal hair distribution.  Adequate perineal body, normal urethral meatus.  Vagina moderately ugated without lesions or discharge.  Cervix pink, without lesions, discharge or tenderness.  No significant cystocele or rectocele.  Bimanual exam shows uterus to be normal size, regular, mobile and nontender.  Adnexa without masses or tenderness.    EXTREMITIES: No edema.    ASSESSMENT & PLAN:    Lakia was seen today for pre-op exam.    Diagnoses and all orders for this visit:    Preoperative exam for gynecologic surgery    PMB (postmenopausal bleeding)    Thickened endometrium    Cervical stenosis (uterine cervix)  -     miSOPROStoL (CYTOTEC) 200 MCG Tab; Take 1 tablet (200 mcg total) by mouth every 12 (twelve) hours. preprocedure         I have discussed the  risks, benefits, indications, and alternatives of the procedure in detail.  The patient verbalizes her understanding.  All questions answered.  Consents signed.  The patient agrees to proceed to proceed as planned.

## 2024-11-13 ENCOUNTER — PATIENT MESSAGE (OUTPATIENT)
Dept: OBSTETRICS AND GYNECOLOGY | Facility: CLINIC | Age: 59
End: 2024-11-13
Payer: COMMERCIAL

## 2024-11-13 NOTE — H&P (VIEW-ONLY)
Lakia was seen today for pre-op exam.    Diagnoses and all orders for this visit:    Preoperative exam for gynecologic surgery    PMB (postmenopausal bleeding)    Thickened endometrium    Cervical stenosis (uterine cervix)  -     miSOPROStoL (CYTOTEC) 200 MCG Tab; Take 1 tablet (200 mcg total) by mouth every 12 (twelve) hours. preprocedure

## 2024-11-15 ENCOUNTER — ANESTHESIA (OUTPATIENT)
Dept: SURGERY | Facility: OTHER | Age: 59
End: 2024-11-15
Payer: COMMERCIAL

## 2024-11-15 ENCOUNTER — HOSPITAL ENCOUNTER (OUTPATIENT)
Facility: OTHER | Age: 59
Discharge: HOME | End: 2024-11-15
Attending: OBSTETRICS & GYNECOLOGY | Admitting: OBSTETRICS & GYNECOLOGY
Payer: COMMERCIAL

## 2024-11-15 DIAGNOSIS — R93.89 THICKENED ENDOMETRIUM: ICD-10-CM

## 2024-11-15 DIAGNOSIS — Z98.890 STATUS POST HYSTEROSCOPY: Primary | ICD-10-CM

## 2024-11-15 DIAGNOSIS — Z01.818 PREOPERATIVE EXAM FOR GYNECOLOGIC SURGERY: ICD-10-CM

## 2024-11-15 DIAGNOSIS — N95.0 PMB (POSTMENOPAUSAL BLEEDING): ICD-10-CM

## 2024-11-15 PROCEDURE — 71000015 HC POSTOP RECOV 1ST HR: Performed by: OBSTETRICS & GYNECOLOGY

## 2024-11-15 PROCEDURE — 25000003 PHARM REV CODE 250: Performed by: OBSTETRICS & GYNECOLOGY

## 2024-11-15 PROCEDURE — C1782 MORCELLATOR: HCPCS | Performed by: OBSTETRICS & GYNECOLOGY

## 2024-11-15 PROCEDURE — 27201423 OPTIME MED/SURG SUP & DEVICES STERILE SUPPLY: Performed by: OBSTETRICS & GYNECOLOGY

## 2024-11-15 PROCEDURE — 25000003 PHARM REV CODE 250: Performed by: ANESTHESIOLOGY

## 2024-11-15 PROCEDURE — 88305 TISSUE EXAM BY PATHOLOGIST: CPT | Mod: 59 | Performed by: STUDENT IN AN ORGANIZED HEALTH CARE EDUCATION/TRAINING PROGRAM

## 2024-11-15 PROCEDURE — 88305 TISSUE EXAM BY PATHOLOGIST: CPT | Mod: 26,,, | Performed by: STUDENT IN AN ORGANIZED HEALTH CARE EDUCATION/TRAINING PROGRAM

## 2024-11-15 PROCEDURE — 37000009 HC ANESTHESIA EA ADD 15 MINS: Performed by: OBSTETRICS & GYNECOLOGY

## 2024-11-15 PROCEDURE — 36000706: Performed by: OBSTETRICS & GYNECOLOGY

## 2024-11-15 PROCEDURE — 37000008 HC ANESTHESIA 1ST 15 MINUTES: Performed by: OBSTETRICS & GYNECOLOGY

## 2024-11-15 PROCEDURE — 71000033 HC RECOVERY, INTIAL HOUR: Performed by: OBSTETRICS & GYNECOLOGY

## 2024-11-15 PROCEDURE — 25000003 PHARM REV CODE 250: Performed by: NURSE ANESTHETIST, CERTIFIED REGISTERED

## 2024-11-15 PROCEDURE — 63600175 PHARM REV CODE 636 W HCPCS: Performed by: ANESTHESIOLOGY

## 2024-11-15 PROCEDURE — 63600175 PHARM REV CODE 636 W HCPCS: Performed by: NURSE ANESTHETIST, CERTIFIED REGISTERED

## 2024-11-15 PROCEDURE — 36000707: Performed by: OBSTETRICS & GYNECOLOGY

## 2024-11-15 PROCEDURE — 58558 HYSTEROSCOPY BIOPSY: CPT | Mod: ,,, | Performed by: OBSTETRICS & GYNECOLOGY

## 2024-11-15 PROCEDURE — 71000016 HC POSTOP RECOV ADDL HR: Performed by: OBSTETRICS & GYNECOLOGY

## 2024-11-15 RX ORDER — SODIUM CHLORIDE 9 MG/ML
INJECTION, SOLUTION INTRAVENOUS CONTINUOUS
Status: DISCONTINUED | OUTPATIENT
Start: 2024-11-15 | End: 2024-11-15 | Stop reason: HOSPADM

## 2024-11-15 RX ORDER — ONDANSETRON HYDROCHLORIDE 2 MG/ML
4 INJECTION, SOLUTION INTRAVENOUS DAILY PRN
Status: DISCONTINUED | OUTPATIENT
Start: 2024-11-15 | End: 2024-11-15 | Stop reason: HOSPADM

## 2024-11-15 RX ORDER — ONDANSETRON HYDROCHLORIDE 2 MG/ML
INJECTION, SOLUTION INTRAVENOUS
Status: DISCONTINUED | OUTPATIENT
Start: 2024-11-15 | End: 2024-11-15

## 2024-11-15 RX ORDER — LIDOCAINE HYDROCHLORIDE 10 MG/ML
0.5 INJECTION, SOLUTION EPIDURAL; INFILTRATION; INTRACAUDAL; PERINEURAL ONCE
Status: DISCONTINUED | OUTPATIENT
Start: 2024-11-15 | End: 2024-11-15 | Stop reason: HOSPADM

## 2024-11-15 RX ORDER — IBUPROFEN 600 MG/1
600 TABLET ORAL EVERY 6 HOURS
Qty: 30 TABLET | Refills: 0 | Status: SHIPPED | OUTPATIENT
Start: 2024-11-15

## 2024-11-15 RX ORDER — GLUCAGON 1 MG
1 KIT INJECTION
Status: DISCONTINUED | OUTPATIENT
Start: 2024-11-15 | End: 2024-11-15 | Stop reason: HOSPADM

## 2024-11-15 RX ORDER — HYDROMORPHONE HYDROCHLORIDE 2 MG/ML
0.4 INJECTION, SOLUTION INTRAMUSCULAR; INTRAVENOUS; SUBCUTANEOUS EVERY 5 MIN PRN
Status: DISCONTINUED | OUTPATIENT
Start: 2024-11-15 | End: 2024-11-15 | Stop reason: HOSPADM

## 2024-11-15 RX ORDER — SODIUM CHLORIDE, SODIUM LACTATE, POTASSIUM CHLORIDE, CALCIUM CHLORIDE 600; 310; 30; 20 MG/100ML; MG/100ML; MG/100ML; MG/100ML
INJECTION, SOLUTION INTRAVENOUS CONTINUOUS
Status: DISCONTINUED | OUTPATIENT
Start: 2024-11-15 | End: 2024-11-15 | Stop reason: HOSPADM

## 2024-11-15 RX ORDER — SODIUM CHLORIDE 0.9 % (FLUSH) 0.9 %
3 SYRINGE (ML) INJECTION
Status: DISCONTINUED | OUTPATIENT
Start: 2024-11-15 | End: 2024-11-15 | Stop reason: HOSPADM

## 2024-11-15 RX ORDER — MEPERIDINE HYDROCHLORIDE 25 MG/ML
12.5 INJECTION INTRAMUSCULAR; INTRAVENOUS; SUBCUTANEOUS ONCE AS NEEDED
Status: DISCONTINUED | OUTPATIENT
Start: 2024-11-15 | End: 2024-11-15 | Stop reason: HOSPADM

## 2024-11-15 RX ORDER — ACETAMINOPHEN 500 MG
1000 TABLET ORAL
Status: COMPLETED | OUTPATIENT
Start: 2024-11-15 | End: 2024-11-15

## 2024-11-15 RX ORDER — LIDOCAINE HYDROCHLORIDE 20 MG/ML
INJECTION INTRAVENOUS
Status: DISCONTINUED | OUTPATIENT
Start: 2024-11-15 | End: 2024-11-15

## 2024-11-15 RX ORDER — KETOROLAC TROMETHAMINE 30 MG/ML
INJECTION, SOLUTION INTRAMUSCULAR; INTRAVENOUS
Status: DISCONTINUED | OUTPATIENT
Start: 2024-11-15 | End: 2024-11-15

## 2024-11-15 RX ORDER — OXYCODONE HYDROCHLORIDE 5 MG/1
5 TABLET ORAL
Status: DISCONTINUED | OUTPATIENT
Start: 2024-11-15 | End: 2024-11-15 | Stop reason: HOSPADM

## 2024-11-15 RX ORDER — DEXAMETHASONE SODIUM PHOSPHATE 4 MG/ML
INJECTION, SOLUTION INTRA-ARTICULAR; INTRALESIONAL; INTRAMUSCULAR; INTRAVENOUS; SOFT TISSUE
Status: DISCONTINUED | OUTPATIENT
Start: 2024-11-15 | End: 2024-11-15

## 2024-11-15 RX ORDER — FENTANYL CITRATE 50 UG/ML
INJECTION, SOLUTION INTRAMUSCULAR; INTRAVENOUS
Status: DISCONTINUED | OUTPATIENT
Start: 2024-11-15 | End: 2024-11-15

## 2024-11-15 RX ORDER — FAMOTIDINE 20 MG/1
20 TABLET, FILM COATED ORAL
Status: COMPLETED | OUTPATIENT
Start: 2024-11-15 | End: 2024-11-15

## 2024-11-15 RX ORDER — PROPOFOL 10 MG/ML
VIAL (ML) INTRAVENOUS
Status: DISCONTINUED | OUTPATIENT
Start: 2024-11-15 | End: 2024-11-15

## 2024-11-15 RX ORDER — EPHEDRINE SULFATE 50 MG/ML
INJECTION, SOLUTION INTRAVENOUS
Status: DISCONTINUED | OUTPATIENT
Start: 2024-11-15 | End: 2024-11-15

## 2024-11-15 RX ORDER — DEXTROMETHORPHAN HYDROBROMIDE, GUAIFENESIN 5; 100 MG/5ML; MG/5ML
650 LIQUID ORAL EVERY 6 HOURS
Qty: 30 TABLET | Refills: 0 | Status: SHIPPED | OUTPATIENT
Start: 2024-11-15

## 2024-11-15 RX ORDER — MUPIROCIN 20 MG/G
OINTMENT TOPICAL
Status: DISCONTINUED | OUTPATIENT
Start: 2024-11-15 | End: 2024-11-15 | Stop reason: HOSPADM

## 2024-11-15 RX ADMIN — KETOROLAC TROMETHAMINE 30 MG: 30 INJECTION, SOLUTION INTRAMUSCULAR; INTRAVENOUS at 08:11

## 2024-11-15 RX ADMIN — LIDOCAINE HYDROCHLORIDE 50 MG: 20 INJECTION, SOLUTION INTRAVENOUS at 08:11

## 2024-11-15 RX ADMIN — FENTANYL CITRATE 100 MCG: 50 INJECTION, SOLUTION INTRAMUSCULAR; INTRAVENOUS at 08:11

## 2024-11-15 RX ADMIN — OXYCODONE HYDROCHLORIDE 5 MG: 5 TABLET ORAL at 09:11

## 2024-11-15 RX ADMIN — SODIUM CHLORIDE, SODIUM LACTATE, POTASSIUM CHLORIDE, AND CALCIUM CHLORIDE: .6; .31; .03; .02 INJECTION, SOLUTION INTRAVENOUS at 07:11

## 2024-11-15 RX ADMIN — PROPOFOL 200 MG: 10 INJECTION, EMULSION INTRAVENOUS at 08:11

## 2024-11-15 RX ADMIN — FAMOTIDINE 20 MG: 20 TABLET, FILM COATED ORAL at 06:11

## 2024-11-15 RX ADMIN — ONDANSETRON HYDROCHLORIDE 4 MG: 2 INJECTION INTRAMUSCULAR; INTRAVENOUS at 08:11

## 2024-11-15 RX ADMIN — DEXAMETHASONE SODIUM PHOSPHATE 4 MG: 4 INJECTION, SOLUTION INTRAMUSCULAR; INTRAVENOUS at 08:11

## 2024-11-15 RX ADMIN — EPHEDRINE SULFATE 10 MG: 50 INJECTION INTRAVENOUS at 08:11

## 2024-11-15 RX ADMIN — MUPIROCIN: 20 OINTMENT TOPICAL at 06:11

## 2024-11-15 RX ADMIN — ACETAMINOPHEN 1000 MG: 500 TABLET, FILM COATED ORAL at 06:11

## 2024-11-15 NOTE — ANESTHESIA PROCEDURE NOTES
Intubation    Date/Time: 11/15/2024 8:23 AM    Performed by: Sofia Trujillo CRNA  Authorized by: Ashkan Wadsworth MD    Intubation:     Induction:  Intravenous    Intubated:  Postinduction    Mask Ventilation:  Easy mask    Attempts:  1    Attempted By:  CRNA    Difficult Airway Encountered?: No      Complications:  None    Airway Device:  Supraglottic airway/LMA    Airway Device Size:  3.0    Placement Verified By:  Capnometry    Complicating Factors:  Small mouth    Findings Post-Intubation:  BS equal bilateral and atraumatic/condition of teeth unchanged

## 2024-11-15 NOTE — PLAN OF CARE
Lakia Wright has met all discharge criteria from Phase II. Vital Signs are stable, ambulating  without difficulty. Discharge instructions given, patient verbalized understanding. Discharged from facility via wheelchair in stable condition.

## 2024-11-15 NOTE — OP NOTE
[facility] Surgery ([facility]  Surgery Department  Operative Note    SUMMARY     Date of Procedure: 11/15/2024     Procedure: Procedure(s) (LRB):  [id number] (N/A)     Surgeons and Role:     * Bela Naranjo MD - Primary    Assisting Surgeon:CHARLOTTE Alvarado    Pre-Operative Diagnosis: PMB (postmenopausal bleeding) [N95.0]    Post-Operative Diagnosis: Post-Op Diagnosis Codes:     * PMB (postmenopausal bleeding) [N95.0]     * Status post hysteroscopic polypectomy [Z98.890]     * Status post hysteroscopy [Z98.890]    Anesthesia: General    Technical Procedures Used:  After appropriate consents had been obtained, the patient was taken to the operating room and placed on adequate general anesthesia.  She was placed in a dorsal lithotomy position in [facility] and properly positioned for a vaginal procedure.  She was then prepped and draped in the appropriate sterile fashion for vaginal procedure.  Bladder was emptied of clear yellow urine.  Weighted speculum was put in place and a tenaculum was placed on the anterior lip of the cervix.  Uterus was sounded to 7 cm.  Cervix was then gently and gradually dilated from a 3.  To a6 Hegar dilator without difficulty.  Operative hysteroscope was placed into the endometrial cavity and the entire cavity was visualized including both ostia.  A very long endometrial polyp was noted arising from the left tubal ostia.  Using the MyoSure reach the entire polyp was excised down to its base.  Hysteroscope was then removed and a sharp curettage was performed sending additional small amount of tissue to the lab.  Speculum and tenaculum were removed and minimal bleeding was noted.  Patient was then taken down from lithotomy position and sent to recovery in stable condition.    Description of the Findings of the Procedure: Large endometrial polyp arising in the left tubal ostia    Significant Surgical Tasks Conducted by the Assistant(s), if Applicable:  NA    Complications: No    Estimated Blood Loss (EBL): * No values recorded between 11/15/2024  8:35 AM and 11/15/2024  8:57 AM *    Specimens:   Specimen (24h ago, onward)       Start     Ordered    11/15/24 0827  Specimen to Pathology, Surgery Gynecology and Obstetrics  Once        Comments: Pre-op Diagnosis: PMB (postmenopausal bleeding) [N95.0]Procedure(s):[id number] Number of specimens: Name of specimens: 1) Endometrial Polyp - PERM2) Endometrial Scrapings - PERM     References:    Click here for ordering Quick Tip   Question Answer Comment   Procedure Type: Gynecology and Obstetrics    Specimen Class: Routine/Screening    Release to patient Immediate        11/15/24 0817                            Condition: Stable    Disposition: PACU - hemodynamically stable.

## 2024-11-15 NOTE — DISCHARGE INSTRUCTIONS
Home Care Instruction D&C Hysteroscopy             ACTIVITY LEVEL:  Gradually resume your normal activities.    DIET:  At home, gradually resume a regular diet.    BATHING:  You may shower  as desired in one day. You should avoid tub baths, hot tubs and swimming pools until seen by your physician for a post-op follow up.    CARE:  You can expect watery or bloody vaginal discharge for several days. Do not use tampons, please only use pads. Sexual activity is restricted as advised by your doctor.    MEDICATIONS:  You will receive instructions for any pain medications. Pain medication should be taken only if needed and as directed.  WHEN TO CALL THE DOCTOR:   For any heavy vaginal bleeding   Fever over 101°F (38.4°C)   Any lower abdominal pain not relieved by the pain mediation

## 2024-11-15 NOTE — TRANSFER OF CARE
"Anesthesia Transfer of Care Note    Patient: Lakia Wright    Procedure(s) Performed: Procedure(s) (LRB):  [id number] (N/A)    Patient location: PACU    Anesthesia Type: general    Transport from OR: Transported from OR on room air with adequate spontaneous ventilation    Post pain: adequate analgesia    Post assessment: no apparent anesthetic complications    Post vital signs: stable    Level of consciousness: awake, alert and oriented    Nausea/Vomiting: no nausea/vomiting    Complications: none    Transfer of care protocol was followed      Last vitals: Visit Vitals  BP (!) 129/57 (BP Location: Right arm, Patient Position: Lying)   Pulse 77   Temp 36.4 °C (97.5 °F) (Temporal)   Resp 17   Ht 5' 1" (1.549 m)   Wt 56.7 kg (125 lb)   LMP 11/04/2014 (Approximate)   SpO2 97%   Breastfeeding No   BMI 23.62 kg/m²     "

## 2024-11-15 NOTE — DISCHARGE SUMMARY
"Discharge Summary  Gynecology      Admit Date: 11/15/2024    Discharge Date and Time: 11/15/2024     Attending Physician: Bela Naranjo MD    Principal Diagnoses: Status post hysteroscopy    Active Hospital Problems    Diagnosis  POA    *Status post hysteroscopy D&C and polypectomy [Z98.890]  Not Applicable      Resolved Hospital Problems   No resolved problems to display.       Procedures: Procedure(s) (LRB):  [id number] (N/A)    Discharged Condition: good    Hospital Course: Patient presented for scheduled procedure. Patient was passed back to OR for Hysteroscopy D&C and polypectomy. Please see OP note for further details. Tolerated procedure well and patient was taken to recovery in a stable condition. Prior to discharge patient was able to void, ambulate, tolerate PO and pain was well controlled with PO meds. Patient was given routine post-op instructions for which patient voiced understanding. Patient was subsequently discharged home.     Consults: None    Significant Diagnostic Studies:  No results for input(s): "WBC", "HGB", "HCT", "MCV", "PLT" in the last 168 hours.     Treatments:   1. Surgery as above    Disposition: Home or Self Care    Patient Instructions:   Current Discharge Medication List        START taking these medications    Details   acetaminophen (TYLENOL) 650 MG TbSR Take 1 tablet (650 mg total) by mouth every 6 (six) hours. Alternate between ibuprofen and tylenol every 3 hours. For example: @0800: ibuprofen 600mg @1100: tylenol 650mg @1400: ibuprofen 600mg @1700: tylenol 650 mg @2000: ibuprofen 600mg  Qty: 30 tablet, Refills: 0      ibuprofen (ADVIL,MOTRIN) 600 MG tablet Take 1 tablet (600 mg total) by mouth every 6 (six) hours. Alternate between ibuprofen and tylenol every 3 hours. For example: @0800: ibuprofen 600mg @1100: tylenol 650mg @1400: ibuprofen 600mg @1700: tylenol 650 mg @2000: ibuprofen 600mg  Qty: 30 tablet, Refills: 0           CONTINUE these " medications which have NOT CHANGED    Details   estradioL (ESTRACE) 0.01 % (0.1 mg/gram) vaginal cream Place 1 g vaginally twice a week.  Qty: 43 g, Refills: 3    Associated Diagnoses: Postmenopausal atrophic vaginitis      estradioL (ESTRACE) 1 MG tablet Take 1 tablet (1 mg total) by mouth once daily.  Qty: 90 tablet, Refills: 3    Associated Diagnoses: Menopausal syndrome      FLUoxetine 20 MG capsule Take 20 mg by mouth.      fluticasone propionate (FLONASE) 50 mcg/actuation nasal spray 1 spray (50 mcg total) by Each Nostril route once daily.  Qty: 16 g, Refills: 3    Associated Diagnoses: Nasal congestion; Sinus congestion      progesterone (PROMETRIUM) 100 MG capsule Take 1 capsule (100 mg total) by mouth nightly.  Qty: 90 capsule, Refills: 3    Associated Diagnoses: Menopausal syndrome      tretinoin (RETIN-A) 0.05 % cream       UNABLE TO FIND Apply 0.25 g topically Daily. Testosterone 2% gel  Qty: 30 g, Refills: 2    Associated Diagnoses: Menopausal syndrome           STOP taking these medications       miSOPROStoL (CYTOTEC) 200 MCG Tab Comments:   Reason for Stopping:               Discharge Procedure Orders   Diet Adult Regular     Pelvic Rest   Order Comments: Pelvic rest until follow up visit. Nothing in vagina -no sex, tampons, douching, etc.     No dressing needed     Notify your health care provider if you experience any of the following:  temperature >100.4     Notify your health care provider if you experience any of the following:  persistent nausea and vomiting or diarrhea     Notify your health care provider if you experience any of the following:  severe uncontrolled pain     Notify your health care provider if you experience any of the following:  redness, tenderness, or signs of infection (pain, swelling, redness, odor or green/yellow discharge around incision site)     Notify your health care provider if you experience any of the following:  difficulty breathing or increased cough     Notify  your health care provider if you experience any of the following:  severe persistent headache     Notify your health care provider if you experience any of the following:  worsening rash     Notify your health care provider if you experience any of the following:  persistent dizziness, light-headedness, or visual disturbances     Notify your health care provider if you experience any of the following:  increased confusion or weakness     Notify your health care provider if you experience any of the following:   Order Comments: Vaginal bleeding greater than 2 pads per 1 hour for 2 consecutive hours     Activity as tolerated         Anat Alvarado MD (Mary)   Obstetrics and Gynecology, PGY1

## 2024-11-15 NOTE — ANESTHESIA POSTPROCEDURE EVALUATION
Anesthesia Post Evaluation    Patient: Lakia Wright    Procedure(s) Performed: Procedure(s) (LRB):  [id number] (N/A)    Final Anesthesia Type: general      Patient location during evaluation: PACU  Patient participation: Yes- Able to Participate  Level of consciousness: awake and alert  Post-procedure vital signs: reviewed and stable  Pain management: adequate  Airway patency: patent    PONV status at discharge: No PONV  Anesthetic complications: no      Cardiovascular status: blood pressure returned to baseline  Respiratory status: unassisted  Hydration status: euvolemic  Follow-up not needed.              Vitals Value Taken Time   BP 99/54 11/15/24 1055   Temp 36.8 °C (98.3 °F) 11/15/24 0955   Pulse 83 11/15/24 1055   Resp 18 11/15/24 1055   SpO2 95 % 11/15/24 1055         Event Time   Out of Recovery 09:43:24         Pain/[place] Score: Pain Rating Prior to Med Admin: 5 (11/15/2024  9:09 AM)  Pain Rating Post Med Admin: 5 (11/15/2024  9:15 AM)  [place] Score: 10 (11/15/2024 10:55 AM)  Modified [place] Score: 20 (11/15/2024 10:14 AM)

## 2024-11-18 VITALS
HEIGHT: 61 IN | TEMPERATURE: 98 F | SYSTOLIC BLOOD PRESSURE: 99 MMHG | WEIGHT: 125 LBS | DIASTOLIC BLOOD PRESSURE: 54 MMHG | RESPIRATION RATE: 18 BRPM | BODY MASS INDEX: 23.6 KG/M2 | HEART RATE: 83 BPM | OXYGEN SATURATION: 95 %

## 2024-11-22 ENCOUNTER — PATIENT MESSAGE (OUTPATIENT)
Dept: OBSTETRICS AND GYNECOLOGY | Facility: CLINIC | Age: 59
End: 2024-11-22
Payer: COMMERCIAL

## 2024-11-22 LAB
FINAL PATHOLOGIC DIAGNOSIS: NORMAL
GROSS: NORMAL
Lab: NORMAL

## 2024-11-25 ENCOUNTER — DOCUMENTATION ONLY (OUTPATIENT)
Dept: GYNECOLOGIC ONCOLOGY | Facility: CLINIC | Age: 59
End: 2024-11-25
Payer: COMMERCIAL

## 2024-11-25 ENCOUNTER — TELEPHONE (OUTPATIENT)
Dept: OBSTETRICS AND GYNECOLOGY | Facility: CLINIC | Age: 59
End: 2024-11-25
Payer: COMMERCIAL

## 2024-11-25 NOTE — TELEPHONE ENCOUNTER
Called and spoke with patient re: BX results showing complex hyperplasia. Discussed will refer for surgical managment to GYN Onc

## 2024-11-25 NOTE — PROGRESS NOTES
New Patient Referral Intake   Referring MD:  Dr. Naranjo    Type of Referral:  Internal     Diagnosis: PMB, Complex hyperplasia without atypia    Medical Records:  Progress Note: Op Note 11/15/24                                  11/12/2024 Preop Note    Pathology; 11/15/24: s/p D&C                                         Complex hyperplasia without atypia

## 2024-11-26 ENCOUNTER — PATIENT MESSAGE (OUTPATIENT)
Dept: OBSTETRICS AND GYNECOLOGY | Facility: CLINIC | Age: 59
End: 2024-11-26
Payer: COMMERCIAL

## 2024-11-27 ENCOUNTER — PATIENT MESSAGE (OUTPATIENT)
Dept: FAMILY MEDICINE | Facility: CLINIC | Age: 59
End: 2024-11-27
Payer: COMMERCIAL

## 2024-11-29 NOTE — PROGRESS NOTES
Subjective:      Patient ID: Lakia Wright is a 59 y.o. female.    Chief Complaint: Consult (New patient )      HPI    59 yr old para 2 referred from Dr. Naranjo for complex hyperplasia without atypia. Work up for PMB with thickened endometrium.    10/1/2024 EMB  Small fragments of inactive endometrium     11/15/2024 hysteroscopy, D&C. Uterus sounded to 7cm. A very long endometrial polyp was noted arising from the left tubal ostia     1. Endometrium, polyp, polypectomy:   - Focal complex hyperplasia without atypia, arising in an endometrial polyp   - Negative for atypia     2. Endometrium, curettage:   - Focal complex hyperplasia without atypia, arising in an endometrial polyp   - Background early secretory phase endometrium   - Negative for atypia   Comment: both samples negative for atypical hyperplasia    LMP    No medical co morbidities. BMI 23    Prior c section x 2 (LTV incision)    Family history negative for breast/gyn/colon cancer    Review of Systems   Constitutional:  Negative for chills, diaphoresis, fatigue and fever.   Respiratory:  Negative for chest tightness, shortness of breath and wheezing.    Cardiovascular:  Negative for chest pain, palpitations and leg swelling.   Gastrointestinal:  Negative for abdominal pain, constipation, diarrhea, nausea and vomiting.   Genitourinary:  Positive for vaginal bleeding. Negative for difficulty urinating, dysuria, flank pain, frequency, hematuria, pelvic pain, urgency, vaginal discharge and vaginal pain.   Skin:  Negative for color change.   Neurological:  Negative for dizziness, light-headedness and headaches.   Psychiatric/Behavioral:  Negative for agitation.        Past Medical History:   Diagnosis Date    Allergic rhinitis     Depression      Past Surgical History:   Procedure Laterality Date     SECTION, LOW TRANSVERSE      COLONOSCOPY N/A 2018    Procedure: COLONOSCOPY [place];  Surgeon: Maria Alejandra Laguna MD;  Location: [facility];   Service: Endoscopy;  Laterality: N/A;    HYSTEROSCOPY WITH DILATION AND CURETTAGE OF UTERUS N/A 11/15/2024    Procedure: [id number];  Surgeon: Bela Naranjo MD;  [facility];  Service: OB/GYN;  Laterality: N/A;     Family History   Problem Relation Name Age of Onset    Coronary artery disease Paternal Grandfather      Diabetes Father      Hyperlipidemia Father      Hypertension Father      Stroke Father      Melanoma Neg Hx      Psoriasis Neg Hx      Lupus Neg Hx      Breast cancer Neg Hx      Colon cancer Neg Hx       Social History     Socioeconomic History    Marital status:     Number of children: 2   Tobacco Use    Smoking status: Never     Passive exposure: Never    Smokeless tobacco: Never   Substance and Sexual Activity    Alcohol use: Yes     Comment: occ    Drug use: No    Sexual activity: Yes     Partners: Male     Comment: , together since 1993   Social History Narrative    Tries to   do exercise regularly but has not recently    Tries to eat healthy diet but has been slacked off recently         Social Drivers of Health     Financial Resource Strain: Low Risk  (8/20/2024)    Overall Financial Resource Strain (CARDIA)     Difficulty of Paying Living Expenses: Not hard at all   Food Insecurity: No Food Insecurity (8/20/2024)    Hunger Vital Sign     Worried About Running Out of Food in the Last Year: Never true     Ran Out of Food in the Last Year: Never true   Physical Activity: Inactive (8/20/2024)    Exercise Vital Sign     Days of Exercise per Week: 0 days     Minutes of Exercise per Session: 30 min   Stress: Stress Concern Present (8/20/2024)    [employer] [place] [employer] - Occupational Stress Questionnaire     Feeling of Stress : Rather much   Housing Stability: Unknown (8/20/2024)    Housing Stability Vital Sign     Unable to Pay for Housing in the Last Year: No     Current Outpatient Medications   Medication Sig    estradioL (ESTRACE)  "0.01 % (0.1 mg/gram) vaginal cream Place 1 g vaginally twice a week.    estradioL (ESTRACE) 1 MG tablet Take 1 tablet (1 mg total) by mouth once daily.    FLUoxetine 20 MG capsule Take 20 mg by mouth.    fluticasone propionate (FLONASE) 50 mcg/actuation nasal spray 1 spray (50 mcg total) by Each Nostril route once daily.    progesterone (PROMETRIUM) 100 MG capsule Take 1 capsule (100 mg total) by mouth nightly.    tretinoin (RETIN-A) 0.05 % cream     UNABLE TO FIND Apply 0.25 g topically Daily. Testosterone 2% gel     No current facility-administered medications for this visit.     Review of patient's allergies indicates:  No Known Allergies    /61 (BP Location: Right arm, Patient Position: Sitting)   Pulse 64   Temp 98.7 °F (37.1 °C) (Oral)   Resp 18   Ht 5' 1" (1.549 m)   Wt 58.1 kg (128 lb)   LMP 11/04/2014 (Approximate)   SpO2 98%   BMI 24.19 kg/m² '    Objective:   Physical Exam:   Constitutional: She is oriented to person, place, and time. She appears well-developed and well-nourished. No distress.    HENT:   Head: Normocephalic and atraumatic.    Eyes: No scleral icterus.     Cardiovascular:       Exam reveals no cyanosis and no edema.        Pulmonary/Chest: Effort normal. No respiratory distress.        Abdominal: Soft. She exhibits no distension and no fluid wave. There is no abdominal tenderness. There is no rigidity.     Genitourinary:    Uterus normal.      Pelvic exam was performed with patient supine.   There is no rash, tenderness or lesion on the right labia. There is no rash, tenderness or lesion on the left labia. Cervix is normal. Right adnexum displays no mass, no tenderness and no fullness. Left adnexum displays no mass, no tenderness and no fullness. No vaginal discharge or bleeding in the vagina. Uterus is not enlarged, not fixed, not tender and not hosting fibroids.           Musculoskeletal: Normal range of motion and moves all extremeties. No edema.       Neurological: She is " alert and oriented to person, place, and time.    Skin: Skin is warm. No rash noted. No cyanosis or erythema.    Psychiatric: She has a normal mood and affect. Thought content normal.       Assessment:     1. Complex endometrial hyperplasia without atypia        Plan:     I had an extensive conversation with the patient today giving a broad overview of endometrial hyperplasia and to include epidemiology, risk factors, clinical features, diagnosis, as well as surgical treatment. We reviewed both medical management with progestin therapy as well as definitive surgical management. Risks, benefits and alternatives to both were reviewed in detail. We have elected for robotic-assisted hysterectomy, bilateral salpingo-oophorectomy.      The risks, benefits, and indications of the procedure were discussed with the patient and her family members if present.  These included bleeding, transfusion, infection, damage to surrounding tissues (bowel, bladder, ureter), wound separation, lymphedema, conversion to laparotomy if laparoscopic, perioperative cardiac events, VTE, pneumonia, and possible death. She voiced understanding, all questions were answered and consents were signed.     Surgery 1/17/2025   Pre op anesthesia      I spent approximately 45 minutes reviewing the available records and evaluating the patient, out of which over 50% of the time was spent face to face with the patient in counseling and coordinating this patient's care.

## 2024-12-03 ENCOUNTER — OFFICE VISIT (OUTPATIENT)
Dept: OBSTETRICS AND GYNECOLOGY | Facility: CLINIC | Age: 59
End: 2024-12-03
Attending: OBSTETRICS & GYNECOLOGY
Payer: COMMERCIAL

## 2024-12-03 VITALS
HEART RATE: 64 BPM | HEIGHT: 61 IN | BODY MASS INDEX: 23.72 KG/M2 | DIASTOLIC BLOOD PRESSURE: 76 MMHG | WEIGHT: 125.63 LBS | SYSTOLIC BLOOD PRESSURE: 109 MMHG

## 2024-12-03 DIAGNOSIS — N85.01 COMPLEX ENDOMETRIAL HYPERPLASIA: ICD-10-CM

## 2024-12-03 DIAGNOSIS — Z98.890 STATUS POST HYSTEROSCOPY: Primary | ICD-10-CM

## 2024-12-03 PROCEDURE — 99999 PR PBB SHADOW E&M-EST. PATIENT-LVL III: CPT | Mod: PBBFAC,,, | Performed by: OBSTETRICS & GYNECOLOGY

## 2024-12-03 NOTE — PROGRESS NOTES
"  CC: Postoperative visit    Lakia Wright is a 59 y.o. female  presents for a postoperative visit s/p Hysteroscopy with Polypectomy on 11-.  Her postoperative course was uncomplicated.  She is doing well postoperative.    Pathology showed:  1. Endometrium, polyp, polypectomy:   - Focal complex hyperplasia without atypia, arising in an endometrial polyp   - Negative for atypia   - See comment     2. Endometrium, curettage:   - Focal complex hyperplasia without atypia, arising in an endometrial polyp   - Background early secretory phase endometrium   - Negative for atypia   - See comment     Comment: The areas of hyperplasia appear to be arising in an endometrial polyp. Both samples are negative for atypical hyperplasia. This case was reviewed with Dr. CARLOS ENRIQUE Chamberlain [facility].     /76   Pulse 64   Ht 5' 1" (1.549 m)   Wt 57 kg (125 lb 9.6 oz)   LMP 2014 (Approximate)   BMI 23.73 kg/m²     ROS:  GENERAL: No fever, chills, fatigability or weight loss.  VULVAR: No pain, no lesions and no itching.  VAGINAL: No relaxation, no itching, no discharge, no abnormal bleeding and no lesions.  ABDOMEN: No abdominal pain. Denies nausea. Denies vomiting. No diarrhea. No constipation  BREAST: Denies pain. No lumps. No discharge.  URINARY: No incontinence, no nocturia, no frequency and no dysuria.  CARDIOVASCULAR: No chest pain. No shortness of breath. No leg cramps.  NEUROLOGICAL: No headaches. No vision changes.    Physical Exam  APPEARANCE: Well nourished, well developed, in no acute distress.  AFFECT: WNL, alert and oriented x 3  SKIN: No acne or hirsutism  ABDOMEN: Soft.  No tenderness or masses.  No hepatosplenomegaly.  No hernias.  INCISIONS: Clean, dry, intact. Well healed.   PELVIC: Normal external genitalia without lesions.  Normal hair distribution.  Adequate perineal body, normal urethral meatus.  Vagina moist and well rugated without lesions or discharge.  Cervix pink, without " lesions, discharge or tenderness.  No significant cystocele or rectocele.  Bimanual exam shows uterus to be normal size, regular, mobile and nontender.  Adnexa without masses or tenderness.    EXTREMITIES: No edema.      1. Status post hysteroscopy D&C and polypectomy        2. Complex endometrial hyperplasia            Patient can return to normal activities.  Follow up with Dr. Bradley for surgical consultation.   Advised to continue progesterone, but stop estradiol for now.

## 2024-12-04 ENCOUNTER — TELEPHONE (OUTPATIENT)
Dept: GYNECOLOGIC ONCOLOGY | Facility: CLINIC | Age: 59
End: 2024-12-04
Payer: COMMERCIAL

## 2024-12-04 ENCOUNTER — OFFICE VISIT (OUTPATIENT)
Dept: GYNECOLOGIC ONCOLOGY | Facility: CLINIC | Age: 59
End: 2024-12-04
Payer: COMMERCIAL

## 2024-12-04 VITALS
BODY MASS INDEX: 24.17 KG/M2 | WEIGHT: 128 LBS | HEIGHT: 61 IN | DIASTOLIC BLOOD PRESSURE: 61 MMHG | TEMPERATURE: 99 F | HEART RATE: 64 BPM | RESPIRATION RATE: 18 BRPM | SYSTOLIC BLOOD PRESSURE: 124 MMHG | OXYGEN SATURATION: 98 %

## 2024-12-04 DIAGNOSIS — N85.01 COMPLEX ENDOMETRIAL HYPERPLASIA WITHOUT ATYPIA: Primary | ICD-10-CM

## 2024-12-04 DIAGNOSIS — N88.2 CERVICAL STENOSIS (UTERINE CERVIX): ICD-10-CM

## 2024-12-04 DIAGNOSIS — N85.01 COMPLEX ENDOMETRIAL HYPERPLASIA: ICD-10-CM

## 2024-12-04 DIAGNOSIS — N95.0 PMB (POSTMENOPAUSAL BLEEDING): ICD-10-CM

## 2024-12-04 DIAGNOSIS — Z98.890 STATUS POST HYSTEROSCOPY: Primary | ICD-10-CM

## 2024-12-04 PROCEDURE — 99999 PR PBB SHADOW E&M-EST. PATIENT-LVL IV: CPT | Mod: PBBFAC,,, | Performed by: OBSTETRICS & GYNECOLOGY

## 2024-12-05 ENCOUNTER — OFFICE VISIT (OUTPATIENT)
Dept: FAMILY MEDICINE | Facility: CLINIC | Age: 59
End: 2024-12-05
Payer: COMMERCIAL

## 2024-12-05 VITALS
DIASTOLIC BLOOD PRESSURE: 58 MMHG | OXYGEN SATURATION: 97 % | TEMPERATURE: 98 F | WEIGHT: 127.63 LBS | SYSTOLIC BLOOD PRESSURE: 120 MMHG | HEART RATE: 74 BPM | BODY MASS INDEX: 24.12 KG/M2

## 2024-12-05 DIAGNOSIS — I49.9 IRREGULAR HEART RHYTHM: Primary | ICD-10-CM

## 2024-12-05 PROCEDURE — 99999 PR PBB SHADOW E&M-EST. PATIENT-LVL III: CPT | Mod: PBBFAC,,, | Performed by: FAMILY MEDICINE

## 2024-12-05 NOTE — PROGRESS NOTES
(Portions of this note were dictated using voice recognition software and may contain dictation related errors in spelling/grammar/syntax not found on text review)    CC:   Chief Complaint   Patient presents with    Results     Result from bx in November ( discus meaning) also palpitations occasionally        HPI: 59 y.o. female   Annual exam in August 2024.  Current visit for palpitations.  History of depression currently on fluoxetine   daily from OBGYN, Flonase for allergies, on HRT with progesterone, estrace    Had recent hysteroscopy with polypectomy for endometrial polyp.  Pathology shows focal complex hyperplasia without atypia, no malignancy.  Saw OBGYN on 12/03/2024 .  Saw Gyne Onc yesterday 12/03/2024.  Planning for hysterectomy on 01/17/2025    She had sent me a message that while she was in PACU after her hysteroscopy, they released EKG reading from telemetry was concerned about some irregularities. .  The telemetry strip washad lots of artifact on it.  Discussed further evaluation we can get dedicated EKG in his assess for any concerning  arrhythmia issues    Prior cardiac studies: EKG 2017 NSR normal.  Exercise stress echo 2017:  EF 55-60%, difficult apical window, otherwise no wall motion abnormalities noted.  No evidence of stress-induced myocardial ischemia.      Currently feels well.  When she started thinking about she is sometimes feels some slight irregular heart rate but this is only when she is very still on it is very transient.  Denies any chest pain shortness and breath or weakness or dizziness during these episodes.  Otherwise is feeling well.  Has been under some stress recently, states that she went back up to 20 mg on her fluoxetine through her OBGYN as she was prior on 10 mg.  Does not smoke or drink alcohol.  Does feel like she probably drinks a lot of caffeine.    Past Medical History:   Diagnosis Date    Allergic rhinitis     Depression        Past Surgical History:   Procedure  Laterality Date     SECTION, LOW TRANSVERSE      COLONOSCOPY N/A 2018    Procedure: COLONOSCOPY Clenpiq;  Surgeon: Maria Alejandra Laguna MD;  Location: [facility];  Service: Endoscopy;  Laterality: N/A;    HYSTEROSCOPY WITH DILATION AND CURETTAGE OF UTERUS N/A 11/15/2024    Procedure: [id number];  Surgeon: Bela Naranjo MD;  Location: [facility];  Service: OB/GYN;  Laterality: N/A;       Family History   Problem Relation Name Age of Onset    Coronary artery disease Paternal Grandfather      Diabetes Father      Hyperlipidemia Father      Hypertension Father      Stroke Father      Melanoma Neg Hx      Psoriasis Neg Hx      Lupus Neg Hx      Breast cancer Neg Hx      Colon cancer Neg Hx         Social History     Tobacco Use    Smoking status: Never     Passive exposure: Never    Smokeless tobacco: Never   Substance Use Topics    Alcohol use: Yes     Comment: occ    Drug use: No       Lab Results   Component Value Date    WBC 5.33 2024    HGB 14.3 2024    HCT 43.3 2024    MCV 94 2024     2024    CHOL 205 (H) 2024    TRIG 96 2024    HDL 55 2024    ALT 10 2024    AST 15 2024    BILITOT 0.5 2024    ALKPHOS 45 (L) 2024     2024    K 4.1 2024     2024    CREATININE 0.8 2024    ESTGFRAFRICA >60 06/15/2022    EGFRNONAA >60 06/15/2022    EGFRNORACEVR >60 2024    CALCIUM 9.2 2024    ALBUMIN 3.7 2024    BUN 14 2024    CO2 27 2024    TSH 1.805 2024    HGBA1C 5.6 2024    LDLCALC 130.8 2024    GLU 94 2024    AFVKEPPR95XC 25 (L) 2019                 Vital signs reviewed  Vitals:    24 1108   BP: (!) 120/58   BP Location: Left arm   Patient Position: Sitting   Pulse: 74   Temp: 97.8 °F (36.6 °C)   TempSrc: Oral   SpO2: 97%   Weight: 57.9 kg (127 lb 10.3 oz)       Wt Readings from Last 4 Encounters:   24 57.9 kg  (127 lb 10.3 oz)   12/04/24 58.1 kg (128 lb)   12/03/24 57 kg (125 lb 9.6 oz)   11/15/24 56.7 kg (125 lb)       PE:   APPEARANCE: Well nourished, well developed, in no acute distress.    HEAD: Normocephalic, atraumatic.  EYES: PERRL. EOMI.   Conjunctivae noninjected.  EARS: TM's intact. Light reflex normal. No retraction or perforation  NOSE: Mucosa pink. Airway clear.  MOUTH & THROAT: No tonsillar enlargement. No pharyngeal erythema or exudate.   NECK: Supple with no cervical lymphadenopathy.    CHEST: Good inspiratory effort. Lungs clear to auscultation with no wheezes or crackles.  CARDIOVASCULAR: Normal S1, S2. No rubs, murmurs, or gallops.  ABDOMEN: Bowel sounds normal. Not distended. Soft. No tenderness or masses. No organomegaly.  EXTREMITIES: No edema       IMPRESSION  1. Irregular heart rhythm            PLAN  Orders Placed This Encounter   Procedures    EKG 12-lead         Reviewed prior labs which are reassuring.  Baseline EKG was done in the office, personally reviewed showing:  Sinus bradycardia rate 59, no ectopy.  Normal axis.  Normal intervals.  No acute ST or T-wave changes.    Reassurance.  Notify for any worsening palpitation symptoms in which case we can consider Holter monitoring.  Otherwise no immediate concerns     Planning to go for hysterectomy in January.  She has no concerning cardiopulmonary symptoms.  She would be medically stable for this      SCREENINGS  Immunizations  tdap 2018  Flu:   Declines today  CoVID 02/26/2021, 03/19/2021, 12/21/21,  eligible  Zoster up-to-date     Pap:  Dr. Naranjo      Mmg:  Up-to-date through her gyn.  DCIS screening on 07/15/2024, needed diagnostic and ultrasound, benign rtc 1 year.     Colon screen:  Colonoscopy 2018, internal hemorrhoids, diverticulosis left side of the colon.  1 diminutive polyp splenic flexure, recall 10 years

## 2024-12-06 LAB
OHS QRS DURATION: 84 MS
OHS QTC CALCULATION: 413 MS

## 2024-12-10 ENCOUNTER — PATIENT MESSAGE (OUTPATIENT)
Dept: GYNECOLOGIC ONCOLOGY | Facility: CLINIC | Age: 59
End: 2024-12-10
Payer: COMMERCIAL

## 2024-12-10 ENCOUNTER — TELEPHONE (OUTPATIENT)
Dept: GYNECOLOGIC ONCOLOGY | Facility: CLINIC | Age: 59
End: 2024-12-10
Payer: COMMERCIAL

## 2025-01-08 ENCOUNTER — ANESTHESIA EVENT (OUTPATIENT)
Dept: SURGERY | Facility: OTHER | Age: 60
End: 2025-01-08
Payer: COMMERCIAL

## 2025-01-09 RX ORDER — FAMOTIDINE 20 MG/1
20 TABLET, FILM COATED ORAL
OUTPATIENT
Start: 2025-01-09

## 2025-01-09 RX ORDER — CEFAZOLIN SODIUM 2 G/50ML
2 SOLUTION INTRAVENOUS
OUTPATIENT
Start: 2025-01-09

## 2025-01-09 RX ORDER — HEPARIN SODIUM 5000 [USP'U]/ML
5000 INJECTION, SOLUTION INTRAVENOUS; SUBCUTANEOUS ONCE
OUTPATIENT
Start: 2025-01-17

## 2025-01-09 RX ORDER — MUPIROCIN 20 MG/G
OINTMENT TOPICAL
OUTPATIENT
Start: 2025-01-09

## 2025-01-09 RX ORDER — SODIUM CHLORIDE 9 MG/ML
INJECTION, SOLUTION INTRAVENOUS CONTINUOUS
OUTPATIENT
Start: 2025-01-09

## 2025-01-10 ENCOUNTER — HOSPITAL ENCOUNTER (OUTPATIENT)
Dept: PREADMISSION TESTING | Facility: OTHER | Age: 60
Discharge: HOME | End: 2025-01-10
Attending: OBSTETRICS & GYNECOLOGY
Payer: COMMERCIAL

## 2025-01-10 VITALS
OXYGEN SATURATION: 97 % | HEIGHT: 61 IN | BODY MASS INDEX: 24.1 KG/M2 | DIASTOLIC BLOOD PRESSURE: 59 MMHG | TEMPERATURE: 98 F | HEART RATE: 63 BPM | RESPIRATION RATE: 18 BRPM | WEIGHT: 127.63 LBS | SYSTOLIC BLOOD PRESSURE: 116 MMHG

## 2025-01-10 DIAGNOSIS — N85.01 COMPLEX ENDOMETRIAL HYPERPLASIA WITHOUT ATYPIA: ICD-10-CM

## 2025-01-10 DIAGNOSIS — Z01.818 PREOP TESTING: Primary | ICD-10-CM

## 2025-01-10 LAB
ABO + RH BLD: NORMAL
BASOPHILS # BLD AUTO: 0.03 K/UL (ref 0–0.2)
BASOPHILS NFR BLD: 0.5 % (ref 0–1.9)
BLD GP AB SCN CELLS X3 SERPL QL: NORMAL
DIFFERENTIAL METHOD BLD: ABNORMAL
EOSINOPHIL # BLD AUTO: 0.1 K/UL (ref 0–0.5)
EOSINOPHIL NFR BLD: 1 % (ref 0–8)
ERYTHROCYTE [DISTWIDTH] IN BLOOD BY AUTOMATED COUNT: 13.2 % (ref 11.5–14.5)
HCT VFR BLD AUTO: 42.4 % (ref 37–48.5)
HGB BLD-MCNC: 14.4 G/DL (ref 12–16)
IMM GRANULOCYTES # BLD AUTO: 0.01 K/UL (ref 0–0.04)
IMM GRANULOCYTES NFR BLD AUTO: 0.2 % (ref 0–0.5)
LYMPHOCYTES # BLD AUTO: 1.8 K/UL (ref 1–4.8)
LYMPHOCYTES NFR BLD: 29.5 % (ref 18–48)
MCH RBC QN AUTO: 31.7 PG (ref 27–31)
MCHC RBC AUTO-ENTMCNC: 34 G/DL (ref 32–36)
MCV RBC AUTO: 93 FL (ref 82–98)
MONOCYTES # BLD AUTO: 0.4 K/UL (ref 0.3–1)
MONOCYTES NFR BLD: 6.3 % (ref 4–15)
NEUTROPHILS # BLD AUTO: 3.8 K/UL (ref 1.8–7.7)
NEUTROPHILS NFR BLD: 62.5 % (ref 38–73)
NRBC BLD-RTO: 0 /100 WBC
PLATELET # BLD AUTO: 267 K/UL (ref 150–450)
PMV BLD AUTO: 9.7 FL (ref 9.2–12.9)
RBC # BLD AUTO: 4.54 M/UL (ref 4–5.4)
SPECIMEN OUTDATE: NORMAL
WBC # BLD AUTO: 6 K/UL (ref 3.9–12.7)

## 2025-01-10 PROCEDURE — 85025 COMPLETE CBC W/AUTO DIFF WBC: CPT | Performed by: ANESTHESIOLOGY

## 2025-01-10 PROCEDURE — 86901 BLOOD TYPING SEROLOGIC RH(D): CPT | Performed by: OBSTETRICS & GYNECOLOGY

## 2025-01-10 NOTE — DISCHARGE INSTRUCTIONS
Information to Prepare you for your Surgery    PRE-ADMIT TESTING -  [phone]    [address]  [facility]          Your surgery has been scheduled at [facility]. We are pleased to have the opportunity to serve you. For Further Information please call [phone].    On the day of surgery please report to the Information Desk on the 1st floor.    CONTACT YOUR PHYSICIAN'S OFFICE THE DAY PRIOR TO YOUR SURGERY TO OBTAIN YOUR ARRIVAL TIME.     The evening before surgery do not eat anything after 9 p.m. ( this includes hard candy, chewing gum and mints).  You may only have GATORADE, POWERADE AND WATER  from 9 p.m. until you leave your home.   DO NOT DRINK ANY LIQUIDS ON THE WAY TO THE HOSPITAL.      Why does your anesthesiologist allow you to drink Gatorade/Powerade before surgery?  Gatorade/Powerade helps to increase your comfort before surgery and to decrease your nausea after surgery. The carbohydrates in Gatorade/Powerade help reduce your body's stress response to surgery.  If you are a diabetic-drink only water prior to surgery.    Outpatient Surgery- May allow 2 adult (18 and older) Support Persons (1 being the designated ) for all surgical/procedural patients. A breastfeeding mother will be allowed her infant and 2 adult Support Persons. No one under the age of 18 will be allowed in the building. No swapping out of visitors in the [facility].      SPECIAL MEDICATION INSTRUCTIONS: TAKE medications checked off by the Anesthesiologist on your Medication List.    Angiogram Patients: Take medications as instructed by your physician, including aspirin.     Surgery Patients:    If you take ASPIRIN - Your PHYSICIAN/SURGEON will need to inform you IF/OR when you need to stop taking aspirin prior to your surgery.     The week prior to surgery do not ot take any medications containing IBUPROFEN or NSAIDS ( Advil, Motrin, Goodys, BC, Aleve, Naproxen etc)  If you are not sure if you should take a medicine please call your surgeon's office.  Ok to take Tylenol    Do Not Wear any make-up (especially eye make-up) to surgery. Please remove any false eyelashes or eyelash extensions. If you arrive the day of surgery with makeup/eyelashes on you will be required to remove prior to surgery. (There is a risk of corneal abrasions if eye makeup/eyelash extensions are not removed)      Leave all valuables at home.   Do Not wear any jewelry or watches, including any metal in body piercings. Jewelry must be removed prior to coming to the hospital.  There is a possibility that rings that are unable to be removed may be cut off if they are on the surgical extremity.    Please remove all hair extensions, wigs, clips and any other metal accessories/ ornaments from your hair.  These items may pose a flammable/fire risk in Surgery and must be removed.    Do not shave your surgical area at least 5 days prior to your surgery. The surgical prep will be performed at the hospital according to Infection Control regulations.    Contact Lens must be removed before surgery. Either do not wear the contact lens or bring a case and solution for storage.  Please bring a container for eyeglasses or dentures as required.  Bring any paperwork your physician has provided, such as consent forms,  history and physicals, doctor's orders, etc.   Bring comfortable clothes that are loose fitting to wear upon discharge. Take into consideration the type of surgery being performed.  Maintain your diet as advised per your physician the day prior to surgery.      Adequate rest the night before surgery is advised.   Park in the Parking lot behind the hospital or in the [facility] across the street from the parking lot. Parking is complimentary.  If you will be discharged the same day as your procedure, please arrange for a responsible adult to drive you home or to accompany you if traveling by taxi.    YOU WILL NOT BE PERMITTED TO DRIVE OR TO LEAVE THE HOSPITAL ALONE AFTER SURGERY.   If you are being discharged the same day, it is strongly recommended that you arrange for someone to remain with you for the first 24 hrs following your surgery.    The Surgeon will speak to your family/visitor after your surgery regarding the outcome of your surgery and post op care.  The Surgeon may speak to you after your surgery, but there is a possibility you may not remember the details.  Please check with your family members regarding the conversation with the Surgeon.    We strongly recommend whoever is bringing you home be present for discharge instructions.  This will ensure a thorough understanding for your post op home care.    If the patient has fever, cough, or signs/symptoms of Flu or Covid please do not come in for your surgery. Contact your surgeon and your primary care physician for further instructions.           Thank you for your cooperation.  The Staff of [facility].            Bathing Instructions with Hibiclens    Shower the evening before and morning of your procedure with Chlorhexidine (Hibiclens)  do not use Chlorhexidine on your face or genitals. Do not get in your eyes.  Wash your face with water and your regular face wash/soap  Use your regular shampoo  Apply Chlorhexidine (Hibiclens) directly on your skin or on a wet washcloth and wash gently. When showering: Move away from the shower stream when applying Chlorhexidine (Hibiclens) to avoid rinsing off too soon.  Rinse thoroughly with warm water  Do not dilute Chlorhexidine (Hibiclens)   Dry off as usual, do not use any deodorant, powder, body lotions, perfume, after shave or cologne.

## 2025-01-15 ENCOUNTER — TELEPHONE (OUTPATIENT)
Dept: GYNECOLOGIC ONCOLOGY | Facility: CLINIC | Age: 60
End: 2025-01-15
Payer: COMMERCIAL

## 2025-01-17 ENCOUNTER — HOSPITAL ENCOUNTER (OUTPATIENT)
Facility: OTHER | Age: 60
Discharge: HOME | End: 2025-01-18
Attending: OBSTETRICS & GYNECOLOGY | Admitting: OBSTETRICS & GYNECOLOGY
Payer: COMMERCIAL

## 2025-01-17 ENCOUNTER — ANESTHESIA (OUTPATIENT)
Dept: SURGERY | Facility: OTHER | Age: 60
End: 2025-01-17
Payer: COMMERCIAL

## 2025-01-17 DIAGNOSIS — Z90.710 STATUS POST HYSTERECTOMY: Primary | ICD-10-CM

## 2025-01-17 DIAGNOSIS — N85.01 COMPLEX ENDOMETRIAL HYPERPLASIA WITHOUT ATYPIA: ICD-10-CM

## 2025-01-17 LAB — POCT GLUCOSE: 80 MG/DL (ref 70–110)

## 2025-01-17 PROCEDURE — 25000003 PHARM REV CODE 250: Performed by: ANESTHESIOLOGY

## 2025-01-17 PROCEDURE — 37000009 HC ANESTHESIA EA ADD 15 MINS: Performed by: OBSTETRICS & GYNECOLOGY

## 2025-01-17 PROCEDURE — 63600175 PHARM REV CODE 636 W HCPCS

## 2025-01-17 PROCEDURE — 36000713 HC OR TIME LEV V EA ADD 15 MIN: Performed by: OBSTETRICS & GYNECOLOGY

## 2025-01-17 PROCEDURE — 36000712 HC OR TIME LEV V 1ST 15 MIN: Performed by: OBSTETRICS & GYNECOLOGY

## 2025-01-17 PROCEDURE — 37000008 HC ANESTHESIA 1ST 15 MINUTES: Performed by: OBSTETRICS & GYNECOLOGY

## 2025-01-17 PROCEDURE — 63600175 PHARM REV CODE 636 W HCPCS: Performed by: OBSTETRICS & GYNECOLOGY

## 2025-01-17 PROCEDURE — 63600175 PHARM REV CODE 636 W HCPCS: Performed by: ANESTHESIOLOGY

## 2025-01-17 PROCEDURE — 25000003 PHARM REV CODE 250

## 2025-01-17 PROCEDURE — 58571 TLH W/T/O 250 G OR LESS: CPT | Mod: AS,,, | Performed by: PHYSICIAN ASSISTANT

## 2025-01-17 PROCEDURE — D9220A PRA ANESTHESIA: Mod: ANES,,, | Performed by: ANESTHESIOLOGY

## 2025-01-17 PROCEDURE — 71000033 HC RECOVERY, INTIAL HOUR: Performed by: OBSTETRICS & GYNECOLOGY

## 2025-01-17 PROCEDURE — 94761 N-INVAS EAR/PLS OXIMETRY MLT: CPT

## 2025-01-17 PROCEDURE — 27201423 OPTIME MED/SURG SUP & DEVICES STERILE SUPPLY: Performed by: OBSTETRICS & GYNECOLOGY

## 2025-01-17 PROCEDURE — 51798 US URINE CAPACITY MEASURE: CPT | Performed by: OBSTETRICS & GYNECOLOGY

## 2025-01-17 PROCEDURE — 25000003 PHARM REV CODE 250: Performed by: OBSTETRICS & GYNECOLOGY

## 2025-01-17 PROCEDURE — 71000039 HC RECOVERY, EACH ADD'L HOUR: Performed by: OBSTETRICS & GYNECOLOGY

## 2025-01-17 PROCEDURE — 63600175 PHARM REV CODE 636 W HCPCS: Performed by: NURSE ANESTHETIST, CERTIFIED REGISTERED

## 2025-01-17 PROCEDURE — P9045 ALBUMIN (HUMAN), 5%, 250 ML: HCPCS | Mod: JZ,TB | Performed by: NURSE ANESTHETIST, CERTIFIED REGISTERED

## 2025-01-17 PROCEDURE — 58571 TLH W/T/O 250 G OR LESS: CPT | Mod: ,,, | Performed by: OBSTETRICS & GYNECOLOGY

## 2025-01-17 PROCEDURE — D9220A PRA ANESTHESIA: Mod: CRNA,,, | Performed by: NURSE ANESTHETIST, CERTIFIED REGISTERED

## 2025-01-17 PROCEDURE — 63600175 PHARM REV CODE 636 W HCPCS: Mod: TB

## 2025-01-17 PROCEDURE — 63600175 PHARM REV CODE 636 W HCPCS: Mod: JZ,TB | Performed by: ANESTHESIOLOGY

## 2025-01-17 PROCEDURE — 82962 GLUCOSE BLOOD TEST: CPT | Performed by: OBSTETRICS & GYNECOLOGY

## 2025-01-17 PROCEDURE — 99900035 HC TECH TIME PER 15 MIN (STAT)

## 2025-01-17 PROCEDURE — 88342 IMHCHEM/IMCYTCHM 1ST ANTB: CPT | Performed by: PATHOLOGY

## 2025-01-17 PROCEDURE — 25000003 PHARM REV CODE 250: Performed by: NURSE ANESTHETIST, CERTIFIED REGISTERED

## 2025-01-17 PROCEDURE — 88309 TISSUE EXAM BY PATHOLOGIST: CPT | Performed by: PATHOLOGY

## 2025-01-17 RX ORDER — SODIUM CHLORIDE 9 MG/ML
INJECTION, SOLUTION INTRAVENOUS CONTINUOUS
Status: DISCONTINUED | OUTPATIENT
Start: 2025-01-17 | End: 2025-01-17

## 2025-01-17 RX ORDER — MUPIROCIN 20 MG/G
OINTMENT TOPICAL
Status: DISCONTINUED | OUTPATIENT
Start: 2025-01-17 | End: 2025-01-17

## 2025-01-17 RX ORDER — ONDANSETRON HYDROCHLORIDE 2 MG/ML
4 INJECTION, SOLUTION INTRAVENOUS DAILY PRN
Status: DISCONTINUED | OUTPATIENT
Start: 2025-01-17 | End: 2025-01-17

## 2025-01-17 RX ORDER — FEXOFENADINE HCL 60 MG/1
60 TABLET, FILM COATED ORAL DAILY
COMMUNITY

## 2025-01-17 RX ORDER — ROCURONIUM BROMIDE 10 MG/ML
INJECTION, SOLUTION INTRAVENOUS
Status: DISCONTINUED | OUTPATIENT
Start: 2025-01-17 | End: 2025-01-17

## 2025-01-17 RX ORDER — SIMETHICONE 80 MG
1 TABLET,CHEWABLE ORAL 3 TIMES DAILY PRN
Status: DISCONTINUED | OUTPATIENT
Start: 2025-01-17 | End: 2025-01-18 | Stop reason: HOSPADM

## 2025-01-17 RX ORDER — FLUOXETINE HYDROCHLORIDE 20 MG/1
20 CAPSULE ORAL DAILY
Status: DISCONTINUED | OUTPATIENT
Start: 2025-01-18 | End: 2025-01-18 | Stop reason: HOSPADM

## 2025-01-17 RX ORDER — PHENYLEPHRINE HYDROCHLORIDE 10 MG/ML
INJECTION INTRAVENOUS
Status: DISCONTINUED | OUTPATIENT
Start: 2025-01-17 | End: 2025-01-17

## 2025-01-17 RX ORDER — OXYCODONE HYDROCHLORIDE 5 MG/1
5 TABLET ORAL
Status: DISCONTINUED | OUTPATIENT
Start: 2025-01-17 | End: 2025-01-17

## 2025-01-17 RX ORDER — DEXAMETHASONE SODIUM PHOSPHATE 4 MG/ML
INJECTION, SOLUTION INTRA-ARTICULAR; INTRALESIONAL; INTRAMUSCULAR; INTRAVENOUS; SOFT TISSUE
Status: DISCONTINUED | OUTPATIENT
Start: 2025-01-17 | End: 2025-01-17

## 2025-01-17 RX ORDER — ACETAMINOPHEN 500 MG
1000 TABLET ORAL
Status: COMPLETED | OUTPATIENT
Start: 2025-01-17 | End: 2025-01-17

## 2025-01-17 RX ORDER — FAMOTIDINE 20 MG/1
20 TABLET, FILM COATED ORAL
Status: COMPLETED | OUTPATIENT
Start: 2025-01-17 | End: 2025-01-17

## 2025-01-17 RX ORDER — PROPOFOL 10 MG/ML
VIAL (ML) INTRAVENOUS
Status: DISCONTINUED | OUTPATIENT
Start: 2025-01-17 | End: 2025-01-17

## 2025-01-17 RX ORDER — DEXTROMETHORPHAN HYDROBROMIDE, GUAIFENESIN 5; 100 MG/5ML; MG/5ML
650 LIQUID ORAL EVERY 6 HOURS PRN
Qty: 60 TABLET | Refills: 0 | Status: SHIPPED | OUTPATIENT
Start: 2025-01-17

## 2025-01-17 RX ORDER — OXYCODONE HYDROCHLORIDE 10 MG/1
10 TABLET ORAL EVERY 4 HOURS PRN
Status: DISCONTINUED | OUTPATIENT
Start: 2025-01-17 | End: 2025-01-18 | Stop reason: HOSPADM

## 2025-01-17 RX ORDER — MIDAZOLAM HYDROCHLORIDE 1 MG/ML
INJECTION INTRAMUSCULAR; INTRAVENOUS
Status: DISCONTINUED | OUTPATIENT
Start: 2025-01-17 | End: 2025-01-17

## 2025-01-17 RX ORDER — ACETAMINOPHEN 500 MG
1000 TABLET ORAL EVERY 6 HOURS
Status: DISCONTINUED | OUTPATIENT
Start: 2025-01-17 | End: 2025-01-18 | Stop reason: HOSPADM

## 2025-01-17 RX ORDER — SODIUM CHLORIDE 0.9 % (FLUSH) 0.9 %
3 SYRINGE (ML) INJECTION
Status: DISCONTINUED | OUTPATIENT
Start: 2025-01-17 | End: 2025-01-17

## 2025-01-17 RX ORDER — DOCUSATE SODIUM 50 MG AND SENNOSIDES 8.6 MG 8.6; 5 MG/1; MG/1
1 TABLET, FILM COATED ORAL DAILY PRN
Qty: 60 TABLET | Refills: 0 | Status: SHIPPED | OUTPATIENT
Start: 2025-01-17

## 2025-01-17 RX ORDER — IBUPROFEN 600 MG/1
600 TABLET ORAL EVERY 6 HOURS
Status: DISCONTINUED | OUTPATIENT
Start: 2025-01-18 | End: 2025-01-18 | Stop reason: HOSPADM

## 2025-01-17 RX ORDER — LIDOCAINE HYDROCHLORIDE 20 MG/ML
INJECTION INTRAVENOUS
Status: DISCONTINUED | OUTPATIENT
Start: 2025-01-17 | End: 2025-01-17

## 2025-01-17 RX ORDER — CYCLOBENZAPRINE HCL 5 MG
5 TABLET ORAL ONCE
Status: COMPLETED | OUTPATIENT
Start: 2025-01-17 | End: 2025-01-17

## 2025-01-17 RX ORDER — CEFAZOLIN 2 G/1
2 INJECTION, POWDER, FOR SOLUTION INTRAMUSCULAR; INTRAVENOUS
Status: COMPLETED | OUTPATIENT
Start: 2025-01-17 | End: 2025-01-17

## 2025-01-17 RX ORDER — FENTANYL CITRATE 50 UG/ML
INJECTION, SOLUTION INTRAMUSCULAR; INTRAVENOUS
Status: DISCONTINUED | OUTPATIENT
Start: 2025-01-17 | End: 2025-01-17

## 2025-01-17 RX ORDER — CALCIUM CARBONATE 200(500)MG
1000 TABLET,CHEWABLE ORAL 3 TIMES DAILY PRN
Status: DISCONTINUED | OUTPATIENT
Start: 2025-01-17 | End: 2025-01-18 | Stop reason: HOSPADM

## 2025-01-17 RX ORDER — HEPARIN SODIUM 5000 [USP'U]/ML
5000 INJECTION, SOLUTION INTRAVENOUS; SUBCUTANEOUS ONCE
Status: COMPLETED | OUTPATIENT
Start: 2025-01-17 | End: 2025-01-17

## 2025-01-17 RX ORDER — ALBUMIN HUMAN 50 G/1000ML
SOLUTION INTRAVENOUS
Status: DISCONTINUED | OUTPATIENT
Start: 2025-01-17 | End: 2025-01-17

## 2025-01-17 RX ORDER — ONDANSETRON HYDROCHLORIDE 2 MG/ML
4 INJECTION, SOLUTION INTRAVENOUS EVERY 6 HOURS PRN
Status: DISCONTINUED | OUTPATIENT
Start: 2025-01-17 | End: 2025-01-18 | Stop reason: HOSPADM

## 2025-01-17 RX ORDER — PROCHLORPERAZINE EDISYLATE 5 MG/ML
5 INJECTION INTRAMUSCULAR; INTRAVENOUS EVERY 6 HOURS PRN
Status: DISCONTINUED | OUTPATIENT
Start: 2025-01-17 | End: 2025-01-18 | Stop reason: HOSPADM

## 2025-01-17 RX ORDER — SENNOSIDES 8.6 MG/1
8.6 TABLET ORAL 2 TIMES DAILY
Status: DISCONTINUED | OUTPATIENT
Start: 2025-01-17 | End: 2025-01-18 | Stop reason: HOSPADM

## 2025-01-17 RX ORDER — OXYCODONE HYDROCHLORIDE 5 MG/1
5 TABLET ORAL EVERY 4 HOURS PRN
Status: DISCONTINUED | OUTPATIENT
Start: 2025-01-17 | End: 2025-01-18 | Stop reason: HOSPADM

## 2025-01-17 RX ORDER — HYDROMORPHONE HYDROCHLORIDE 2 MG/ML
0.4 INJECTION, SOLUTION INTRAMUSCULAR; INTRAVENOUS; SUBCUTANEOUS EVERY 5 MIN PRN
Status: DISCONTINUED | OUTPATIENT
Start: 2025-01-17 | End: 2025-01-17

## 2025-01-17 RX ORDER — SODIUM CHLORIDE, SODIUM LACTATE, POTASSIUM CHLORIDE, CALCIUM CHLORIDE 600; 310; 30; 20 MG/100ML; MG/100ML; MG/100ML; MG/100ML
INJECTION, SOLUTION INTRAVENOUS CONTINUOUS
Status: DISCONTINUED | OUTPATIENT
Start: 2025-01-17 | End: 2025-01-17

## 2025-01-17 RX ORDER — MEPERIDINE HYDROCHLORIDE 25 MG/ML
12.5 INJECTION INTRAMUSCULAR; INTRAVENOUS; SUBCUTANEOUS ONCE AS NEEDED
Status: DISCONTINUED | OUTPATIENT
Start: 2025-01-17 | End: 2025-01-17

## 2025-01-17 RX ORDER — PREGABALIN 75 MG/1
75 CAPSULE ORAL ONCE
Status: COMPLETED | OUTPATIENT
Start: 2025-01-17 | End: 2025-01-17

## 2025-01-17 RX ORDER — MUPIROCIN 20 MG/G
OINTMENT TOPICAL 2 TIMES DAILY
Status: DISCONTINUED | OUTPATIENT
Start: 2025-01-17 | End: 2025-01-18 | Stop reason: HOSPADM

## 2025-01-17 RX ORDER — OXYCODONE HYDROCHLORIDE 5 MG/1
5 TABLET ORAL ONCE
Status: COMPLETED | OUTPATIENT
Start: 2025-01-17 | End: 2025-01-17

## 2025-01-17 RX ORDER — LIDOCAINE HYDROCHLORIDE 10 MG/ML
0.5 INJECTION, SOLUTION EPIDURAL; INFILTRATION; INTRACAUDAL; PERINEURAL ONCE
Status: DISCONTINUED | OUTPATIENT
Start: 2025-01-17 | End: 2025-01-17

## 2025-01-17 RX ORDER — DIPHENHYDRAMINE HYDROCHLORIDE 50 MG/ML
25 INJECTION INTRAMUSCULAR; INTRAVENOUS EVERY 6 HOURS PRN
Status: DISCONTINUED | OUTPATIENT
Start: 2025-01-17 | End: 2025-01-18 | Stop reason: HOSPADM

## 2025-01-17 RX ORDER — SODIUM CHLORIDE, SODIUM LACTATE, POTASSIUM CHLORIDE, CALCIUM CHLORIDE 600; 310; 30; 20 MG/100ML; MG/100ML; MG/100ML; MG/100ML
INJECTION, SOLUTION INTRAVENOUS CONTINUOUS
Status: DISCONTINUED | OUTPATIENT
Start: 2025-01-17 | End: 2025-01-18

## 2025-01-17 RX ORDER — HYDROMORPHONE HYDROCHLORIDE 2 MG/ML
0.4 INJECTION, SOLUTION INTRAMUSCULAR; INTRAVENOUS; SUBCUTANEOUS
Status: DISCONTINUED | OUTPATIENT
Start: 2025-01-17 | End: 2025-01-18 | Stop reason: HOSPADM

## 2025-01-17 RX ORDER — GLUCAGON 1 MG
1 KIT INJECTION
Status: DISCONTINUED | OUTPATIENT
Start: 2025-01-17 | End: 2025-01-17

## 2025-01-17 RX ORDER — IBUPROFEN 600 MG/1
600 TABLET ORAL EVERY 6 HOURS PRN
Qty: 60 TABLET | Refills: 0 | Status: SHIPPED | OUTPATIENT
Start: 2025-01-17

## 2025-01-17 RX ORDER — TALC
6 POWDER (GRAM) TOPICAL NIGHTLY PRN
Status: DISCONTINUED | OUTPATIENT
Start: 2025-01-17 | End: 2025-01-18 | Stop reason: HOSPADM

## 2025-01-17 RX ORDER — NALOXONE HCL 0.4 MG/ML
0.02 VIAL (ML) INJECTION
Status: DISCONTINUED | OUTPATIENT
Start: 2025-01-17 | End: 2025-01-18 | Stop reason: HOSPADM

## 2025-01-17 RX ORDER — CYCLOBENZAPRINE HCL 5 MG
TABLET ORAL
Status: DISPENSED
Start: 2025-01-17 | End: 2025-01-17

## 2025-01-17 RX ORDER — ONDANSETRON HYDROCHLORIDE 2 MG/ML
INJECTION, SOLUTION INTRAVENOUS
Status: DISCONTINUED | OUTPATIENT
Start: 2025-01-17 | End: 2025-01-17

## 2025-01-17 RX ORDER — KETOROLAC TROMETHAMINE 30 MG/ML
15 INJECTION, SOLUTION INTRAMUSCULAR; INTRAVENOUS EVERY 6 HOURS
Status: DISCONTINUED | OUTPATIENT
Start: 2025-01-17 | End: 2025-01-18 | Stop reason: HOSPADM

## 2025-01-17 RX ORDER — OXYCODONE HYDROCHLORIDE 5 MG/1
5 TABLET ORAL EVERY 4 HOURS PRN
Qty: 12 TABLET | Refills: 0 | Status: SHIPPED | OUTPATIENT
Start: 2025-01-17

## 2025-01-17 RX ORDER — KETAMINE HCL IN 0.9 % NACL 50 MG/5 ML
SYRINGE (ML) INTRAVENOUS
Status: DISCONTINUED | OUTPATIENT
Start: 2025-01-17 | End: 2025-01-17

## 2025-01-17 RX ADMIN — CEFAZOLIN 2 G: 2 INJECTION, POWDER, FOR SOLUTION INTRAMUSCULAR; INTRAVENOUS at 07:01

## 2025-01-17 RX ADMIN — CYCLOBENZAPRINE HYDROCHLORIDE 5 MG: 5 TABLET, FILM COATED ORAL at 11:01

## 2025-01-17 RX ADMIN — PHENYLEPHRINE HYDROCHLORIDE 300 MCG: 10 INJECTION INTRAVENOUS at 09:01

## 2025-01-17 RX ADMIN — ONDANSETRON 4 MG: 2 INJECTION INTRAMUSCULAR; INTRAVENOUS at 04:01

## 2025-01-17 RX ADMIN — ONDANSETRON HYDROCHLORIDE 4 MG: 2 INJECTION INTRAMUSCULAR; INTRAVENOUS at 09:01

## 2025-01-17 RX ADMIN — HEPARIN SODIUM 5000 UNITS: 5000 INJECTION INTRAVENOUS; SUBCUTANEOUS at 06:01

## 2025-01-17 RX ADMIN — HYDROMORPHONE HYDROCHLORIDE 0.4 MG: 2 INJECTION INTRAMUSCULAR; INTRAVENOUS; SUBCUTANEOUS at 11:01

## 2025-01-17 RX ADMIN — SUGAMMADEX 200 MG: 100 INJECTION, SOLUTION INTRAVENOUS at 09:01

## 2025-01-17 RX ADMIN — FAMOTIDINE 20 MG: 20 TABLET, FILM COATED ORAL at 05:01

## 2025-01-17 RX ADMIN — GLYCOPYRROLATE 0.2 MG: 0.2 INJECTION, SOLUTION INTRAMUSCULAR; INTRAVITREAL at 07:01

## 2025-01-17 RX ADMIN — OXYCODONE HYDROCHLORIDE 5 MG: 5 TABLET ORAL at 04:01

## 2025-01-17 RX ADMIN — PROCHLORPERAZINE EDISYLATE 5 MG: 5 INJECTION INTRAMUSCULAR; INTRAVENOUS at 09:01

## 2025-01-17 RX ADMIN — ALBUMIN (HUMAN) 100 ML: 12.5 SOLUTION INTRAVENOUS at 07:01

## 2025-01-17 RX ADMIN — Medication 25 MG: at 07:01

## 2025-01-17 RX ADMIN — SODIUM CHLORIDE, SODIUM LACTATE, POTASSIUM CHLORIDE, AND CALCIUM CHLORIDE: .6; .31; .03; .02 INJECTION, SOLUTION INTRAVENOUS at 06:01

## 2025-01-17 RX ADMIN — LIDOCAINE HYDROCHLORIDE 75 MG: 20 INJECTION, SOLUTION INTRAVENOUS at 07:01

## 2025-01-17 RX ADMIN — HYDROMORPHONE HYDROCHLORIDE 0.4 MG: 2 INJECTION INTRAMUSCULAR; INTRAVENOUS; SUBCUTANEOUS at 10:01

## 2025-01-17 RX ADMIN — PHENYLEPHRINE HYDROCHLORIDE 300 MCG: 10 INJECTION INTRAVENOUS at 07:01

## 2025-01-17 RX ADMIN — PROPOFOL 170 MG: 10 INJECTION, EMULSION INTRAVENOUS at 07:01

## 2025-01-17 RX ADMIN — MUPIROCIN: 20 OINTMENT TOPICAL at 05:01

## 2025-01-17 RX ADMIN — SODIUM CHLORIDE, SODIUM LACTATE, POTASSIUM CHLORIDE, AND CALCIUM CHLORIDE: .6; .31; .03; .02 INJECTION, SOLUTION INTRAVENOUS at 09:01

## 2025-01-17 RX ADMIN — ROCURONIUM BROMIDE 50 MG: 10 SOLUTION INTRAVENOUS at 07:01

## 2025-01-17 RX ADMIN — PHENYLEPHRINE HYDROCHLORIDE 200 MCG: 10 INJECTION INTRAVENOUS at 09:01

## 2025-01-17 RX ADMIN — ALBUMIN (HUMAN) 500 ML: 12.5 SOLUTION INTRAVENOUS at 08:01

## 2025-01-17 RX ADMIN — ALBUMIN (HUMAN) 400 ML: 12.5 SOLUTION INTRAVENOUS at 08:01

## 2025-01-17 RX ADMIN — KETOROLAC TROMETHAMINE 15 MG: 30 INJECTION, SOLUTION INTRAMUSCULAR; INTRAVENOUS at 05:01

## 2025-01-17 RX ADMIN — CARBOXYMETHYLCELLULOSE SODIUM 2 DROP: 2.5 SOLUTION/ DROPS OPHTHALMIC at 07:01

## 2025-01-17 RX ADMIN — PHENYLEPHRINE HYDROCHLORIDE 200 MCG: 10 INJECTION INTRAVENOUS at 07:01

## 2025-01-17 RX ADMIN — OXYCODONE HYDROCHLORIDE 5 MG: 5 TABLET ORAL at 05:01

## 2025-01-17 RX ADMIN — PREGABALIN 75 MG: 75 CAPSULE ORAL at 05:01

## 2025-01-17 RX ADMIN — ACETAMINOPHEN 1000 MG: 500 TABLET, FILM COATED ORAL at 05:01

## 2025-01-17 RX ADMIN — HYDROMORPHONE HYDROCHLORIDE 0.4 MG: 2 INJECTION INTRAMUSCULAR; INTRAVENOUS; SUBCUTANEOUS at 09:01

## 2025-01-17 RX ADMIN — MIDAZOLAM HYDROCHLORIDE 2 MG: 1 INJECTION INTRAMUSCULAR; INTRAVENOUS at 07:01

## 2025-01-17 RX ADMIN — ROCURONIUM BROMIDE 25 MG: 10 SOLUTION INTRAVENOUS at 08:01

## 2025-01-17 RX ADMIN — OXYCODONE 5 MG: 5 TABLET ORAL at 10:01

## 2025-01-17 RX ADMIN — SODIUM CHLORIDE, POTASSIUM CHLORIDE, SODIUM LACTATE AND CALCIUM CHLORIDE: 600; 310; 30; 20 INJECTION, SOLUTION INTRAVENOUS at 09:01

## 2025-01-17 RX ADMIN — SENNOSIDES 8.6 MG: 8.6 TABLET, FILM COATED ORAL at 09:01

## 2025-01-17 RX ADMIN — MUPIROCIN: 20 OINTMENT TOPICAL at 09:01

## 2025-01-17 RX ADMIN — FENTANYL CITRATE 100 MCG: 50 INJECTION, SOLUTION INTRAMUSCULAR; INTRAVENOUS at 07:01

## 2025-01-17 RX ADMIN — ONDANSETRON 4 MG: 2 INJECTION INTRAMUSCULAR; INTRAVENOUS at 10:01

## 2025-01-17 RX ADMIN — DEXAMETHASONE SODIUM PHOSPHATE 4 MG: 4 INJECTION, SOLUTION INTRAMUSCULAR; INTRAVENOUS at 07:01

## 2025-01-17 NOTE — H&P
H&P below is from clinic visit on 2024. To changes have occurred since H&P was written.     Lakia Wright is 59 y.o.  presenting for scheduled RA-TLH/BSO.    Temp:  [97.9 °F (36.6 °C)] 97.9 °F (36.6 °C)  Pulse:  [59] 59  Resp:  [18] 18  SpO2:  [97 %] 97 %  BP: (113)/(65) 113/65    General: NAD, alert, oriented, cooperative  HEENT: NCAT, EOM grossly intact  Lungs: Normal WOB  Heart: regular rate  Abdomen: soft, nondistended, nontender, no rebound or guarding    Consents in chart. Pre-operative heparin given at 0625 . All questions answered and concerns addressed. To OR for planned procedure.      Mahsa Aguilar MD  [facility]   OBGYN PGY3            Florencia Bradley MD at 2024  9:00 AM    Status: Signed   Subjective:      Patient ID: Lakia Wright is a 59 y.o. female.     Chief Complaint: Consult (New patient )        HPI     59 yr old para 2 referred from Dr. Naranjo for complex hyperplasia without atypia. Work up for PMB with thickened endometrium.     10/1/2024 EMB  Small fragments of inactive endometrium      11/15/2024 hysteroscopy, D&C. Uterus sounded to 7cm. A very long endometrial polyp was noted arising from the left tubal ostia      1. Endometrium, polyp, polypectomy:   - Focal complex hyperplasia without atypia, arising in an endometrial polyp   - Negative for atypia     2. Endometrium, curettage:   - Focal complex hyperplasia without atypia, arising in an endometrial polyp   - Background early secretory phase endometrium   - Negative for atypia   Comment: both samples negative for atypical hyperplasia     LMP     No medical co morbidities. BMI 23     Prior c section x 2 (LTV incision)     Family history negative for breast/gyn/colon cancer     Review of Systems   Constitutional:  Negative for chills, diaphoresis, fatigue and fever.   Respiratory:  Negative for chest tightness, shortness of breath and wheezing.    Cardiovascular:  Negative for chest pain,  palpitations and leg swelling.   Gastrointestinal:  Negative for abdominal pain, constipation, diarrhea, nausea and vomiting.   Genitourinary:  Positive for vaginal bleeding. Negative for difficulty urinating, dysuria, flank pain, frequency, hematuria, pelvic pain, urgency, vaginal discharge and vaginal pain.   Skin:  Negative for color change.   Neurological:  Negative for dizziness, light-headedness and headaches.   Psychiatric/Behavioral:  Negative for agitation.               Past Medical History:   Diagnosis Date    Allergic rhinitis      Depression              Past Surgical History:   Procedure Laterality Date     SECTION, LOW TRANSVERSE        COLONOSCOPY N/A 2018     Procedure: COLONOSCOPY Clenpiq;  Surgeon: Maria Alejandra Laguna MD;  Location: [facility];  Service: Endoscopy;  Laterality: N/A;    HYSTEROSCOPY WITH DILATION AND CURETTAGE OF UTERUS N/A 11/15/2024     Procedure: [id number];  Surgeon: Bela Naranjo MD;  Location: [facility] OR;  Service: OB/GYN;  Laterality: N/A;             Family History   Problem Relation Name Age of Onset    Coronary artery disease Paternal Grandfather        Diabetes Father        Hyperlipidemia Father        Hypertension Father        Stroke Father        Melanoma Neg Hx        Psoriasis Neg Hx        Lupus Neg Hx        Breast cancer Neg Hx        Colon cancer Neg Hx          Social History            Socioeconomic History    Marital status:     Number of children: 2   Tobacco Use    Smoking status: Never       Passive exposure: Never    Smokeless tobacco: Never   Substance and Sexual Activity    Alcohol use: Yes       Comment: occ    Drug use: No    Sexual activity: Yes       Partners: Male       Comment: , together since    Social History Narrative     Tries to   do exercise regularly but has not recently     Tries to eat healthy diet but has been slacked off recently            Social Drivers of Health          "  Financial Resource Strain: Low Risk  (8/20/2024)     Overall Financial Resource Strain (CARDIA)      Difficulty of Paying Living Expenses: Not hard at all   Food Insecurity: No Food Insecurity (8/20/2024)     Hunger Vital Sign      Worried About Running Out of Food in the Last Year: Never true      Ran Out of Food in the Last Year: Never true   Physical Activity: Inactive (8/20/2024)     Exercise Vital Sign      Days of Exercise per Week: 0 days      Minutes of Exercise per Session: 30 min   Stress: Stress Concern Present (8/20/2024)     [facility] [employer] - Occupational Stress Questionnaire      Feeling of Stress : Rather much   Housing Stability: Unknown (8/20/2024)     Housing Stability Vital Sign      Unable to Pay for Housing in the Last Year: No           Current Outpatient Medications   Medication Sig    estradioL (ESTRACE) 0.01 % (0.1 mg/gram) vaginal cream Place 1 g vaginally twice a week.    estradioL (ESTRACE) 1 MG tablet Take 1 tablet (1 mg total) by mouth once daily.    FLUoxetine 20 MG capsule Take 20 mg by mouth.    fluticasone propionate (FLONASE) 50 mcg/actuation nasal spray 1 spray (50 mcg total) by Each Nostril route once daily.    progesterone (PROMETRIUM) 100 MG capsule Take 1 capsule (100 mg total) by mouth nightly.    tretinoin (RETIN-A) 0.05 % cream      UNABLE TO FIND Apply 0.25 g topically Daily. Testosterone 2% gel      No current facility-administered medications for this visit.      Review of patient's allergies indicates:  No Known Allergies     /61 (BP Location: Right arm, Patient Position: Sitting)   Pulse 64   Temp 98.7 °F (37.1 °C) (Oral)   Resp 18   Ht 5' 1" (1.549 m)   Wt 58.1 kg (128 lb)   LMP 11/04/2014 (Approximate)   SpO2 98%   BMI 24.19 kg/m² '     Objective:   Physical Exam:   Constitutional: She is oriented to person, place, and time. She appears well-developed and well-nourished. No distress.    HENT:   Head: Normocephalic and " atraumatic.    Eyes: No scleral icterus.     Cardiovascular:       Exam reveals no cyanosis and no edema.        Pulmonary/Chest: Effort normal. No respiratory distress.         Abdominal: Soft. She exhibits no distension and no fluid wave. There is no abdominal tenderness. There is no rigidity.     Genitourinary:    Uterus normal.      Pelvic exam was performed with patient supine.   There is no rash, tenderness or lesion on the right labia. There is no rash, tenderness or lesion on the left labia. Cervix is normal. Right adnexum displays no mass, no tenderness and no fullness. Left adnexum displays no mass, no tenderness and no fullness. No vaginal discharge or bleeding in the vagina. Uterus is not enlarged, not fixed, not tender and not hosting fibroids.           Musculoskeletal: Normal range of motion and moves all extremeties. No edema.       Neurological: She is alert and oriented to person, place, and time.    Skin: Skin is warm. No rash noted. No cyanosis or erythema.    Psychiatric: She has a normal mood and affect. Thought content normal.         Assessment:      1. Complex endometrial hyperplasia without atypia          Plan:      I had an extensive conversation with the patient today giving a broad overview of endometrial hyperplasia and to include epidemiology, risk factors, clinical features, diagnosis, as well as surgical treatment. We reviewed both medical management with progestin therapy as well as definitive surgical management. Risks, benefits and alternatives to both were reviewed in detail. We have elected for robotic-assisted hysterectomy, bilateral salpingo-oophorectomy.      The risks, benefits, and indications of the procedure were discussed with the patient and her family members if present.  These included bleeding, transfusion, infection, damage to surrounding tissues (bowel, bladder, ureter), wound separation, lymphedema, conversion to laparotomy if laparoscopic, perioperative cardiac  events, VTE, pneumonia, and possible death. She voiced understanding, all questions were answered and consents were signed.     Surgery 1/17/2025   Pre op anesthesia      I spent approximately 45 minutes reviewing the available records and evaluating the patient, out of which over 50% of the time was spent face to face with the patient in counseling and coordinating this patient's care.             Electronically signed by Florencia Bradley MD at 1/9/2025  2:41 PM

## 2025-01-17 NOTE — ANESTHESIA PROCEDURE NOTES
Intubation    Date/Time: 1/17/2025 7:15 AM    Performed by: Abhi Vicente CRNA  Authorized by: Alo Miles MD    Intubation:     Induction:  Intravenous    Intubated:  Postinduction    Mask Ventilation:  Easy mask    Attempts:  1    Attempted By:  CRNA    Method of Intubation:  Video laryngoscopy    Blade:  Rapp 3    Laryngeal View Grade: Grade I - full view of cords      Difficult Airway Encountered?: No      Complications:  None    Airway Device:  Oral endotracheal tube    Airway Device Size:  7.0    Style/Cuff Inflation:  Cuffed    Inflation Amount (mL):  4    Tube secured:  22    Secured at:  The lips    Placement Verified By:  Capnometry    Complicating Factors:  None    Findings Post-Intubation:  BS equal bilateral

## 2025-01-17 NOTE — OP NOTE
[facility]  General Surgery  Operative Note    SUMMARY     Date of Procedure: 2025     Procedure: Procedure(s) (LRB):  XI ROBOTIC HYSTERECTOMY (N/A)  XI ROBOTIC SALPINGO-OOPHORECTOMY (Bilateral)       Surgeons and Role:     * Florencia Bradley MD - Primary     * Mahsa Aguilar MD - Resident - Assisting        Pre-Operative Diagnosis: Status post hysteroscopy [Z98.890]  Complex endometrial hyperplasia [N85.01]  PMB (postmenopausal bleeding) [N95.0]  Cervical stenosis (uterine cervix) [N88.2]    Post-Operative Diagnosis: Post-Op Diagnosis Codes:     * Status post hysteroscopy [Z98.890]     * Complex endometrial hyperplasia [N85.01]     * PMB (postmenopausal bleeding) [N95.0]     * Cervical stenosis (uterine cervix) [N88.2]    Anesthesia: General    Operative Findings (including complications, if any): 6cm uterus with small leiomyoma. Normal appearing bilateral fallopian tubes and ovaries. Mild adhesions of the bladder to the anterior uterus from prior  deliveries.     Description of Technical Procedures: The patient was taken to the Operating Room.  Informed consent had been obtained.  She underwent general endotracheal anesthesia without difficulty, was prepped and draped in the normal sterile fashion in a dorsal lithotomy position.  Timeout was performed.  All parties agreed to the planned procedure.  Perioperative antibiotics were administered.  Almodovar catheter was placed under sterile conditions. The VCare uterine manipulator was then secured in place in a standard fashion for uterine manipulation. Gloves were changed and attention was turned to the patient's abdomen.       Veress needle was gently inserted.  Intra-abdominal placement was confirmed with low CO2 pressure and water drop test.  Abdomen was insufflated and pneumoperitoneum was obtained.  Skin incision was made superior to the umbilicus.  Robotic trocar was introduced.  Intra-abdominal placement was confirmed.   Additional trocars were placed, 2 robotic trocars to the left of the camera, 1 robotic trocar to the right of the camera and an additional 8 mm assist port to the right of the camera. These were placed under direct visualization ensuring no injury to bowel or bladder. The patient was placed in steep Trendelenburg. Robot was docked and operating surgeon reported to the console.       Survey of the abdomen and pelvis revealed the above findings. Bilateral round ligaments were identified. These were cauterized and transected. The posterior leaf of the broad ligament was then opened bilaterally facilitating access to the retroperitoneum. Bilateral infundibulopelvic ligaments were then skeletonized with good visualization of the ureter beneath. These were cauterized and transected.  The anterior leaf of the broad ligament was then opened circumferentially. Proper plane for the bladder flap was identified and the bladder was gently reflected off of the anterior aspect of the uterus and cervix to the level of the cervicovaginal junction. Bilateral uterine arteries were then skeletonized.  These were cauterized and transected.  The remainder of the uterosacral and cardinal ligaments were then also serially cauterized and transected.  Posterior colpotomy was initiated and carried around circumferentially. The uterus, cervix, bilateral fallopian tubes and ovaries were then removed through the vagina.        We then closed the vaginal cuff with a V-Loc suture in a running fashion. Bilateral ureters were reinspected and both noted to be vermiculating equally and briskly. Pelvis was hemostatic.  A final survey was done of the abdomen and pelvis. There was no active bleeding and the integrity of the bowel, bladder, and other visible organs and tissue was again confirmed. The robotic trocars were then removed under direct visualization and the robot was undocked. Skin incisions were then rendered hemostatic. These were closed with  4-0 Monocryl in a subcuticular fashion and topped with sterile dressing. The patient tolerated the procedure well. Sponge, lap, needle and instrument counts were correct x2 as reported by the circulating nurse.  She was awakened from anesthesia and taken to recovery in stable condition.     Significant Surgical Tasks Conducted by the Assistant(s), if Applicable: RODO iWlcox - performed expert bedside robotic assist. I certify that the services for which payment is claimed were medically necessary, and that no qualified resident was available to perform the services.     Estimated Blood Loss (EBL): <25cc    Specimens:   Specimen (24h ago, onward)       Start     Ordered    01/17/25 1001  Specimen to Pathology, Surgery Gynecology and Obstetrics  Once        Comments: Pre-op Diagnosis: Status post hysteroscopy [Z98.890]Complex endometrial hyperplasia [N85.01]PMB (postmenopausal bleeding) [N95.0]Cervical stenosis (uterine cervix) [N88.2]Procedure(s):XI ROBOTIC HYSTERECTOMYXI ROBOTIC SALPINGO-OOPHORECTOMY Number of specimens: 1Name of specimens: uterus, cervix, bilateral tubes and ovaries     References:    Click here for ordering Quick Tip   Question Answer Comment   Procedure Type: Gynecology and Obstetrics    Specimen Class: Known or suspected malignancy    Release to patient Immediate        01/17/25 1001                            Condition: Good    Disposition: PACU - hemodynamically stable.    Attestation: I was present and scrubbed for the entire procedure.

## 2025-01-17 NOTE — DISCHARGE SUMMARY
[facility]  Brief Op Note/Discharge Note  Short Stay    Admit Date: 1/17/2025    Discharge Date: 01/17/2025    Attending Physician: Florencia Bradley MD     Surgery Date: 1/17/2025     Surgeons and Role:     * Florencia rBadley MD - Primary     * Mahsa Aguilar MD - Resident - Assisting      Pre-op Diagnosis:  Status post hysteroscopy [Z98.890]  Complex endometrial hyperplasia [N85.01]  PMB (postmenopausal bleeding) [N95.0]  Cervical stenosis (uterine cervix) [N88.2]    Post-op Diagnosis:  Post-Op Diagnosis Codes:     * Status post hysteroscopy [Z98.890]     * Complex endometrial hyperplasia [N85.01]     * PMB (postmenopausal bleeding) [N95.0]     * Cervical stenosis (uterine cervix) [N88.2]    Procedure(s) (LRB):  XI ROBOTIC HYSTERECTOMY (N/A)  XI ROBOTIC SALPINGO-OOPHORECTOMY (Bilateral)    Anesthesia: General    Findings/Key Components: See operative report for full findings    Estimated Blood Loss: <25cc         Specimens:   Specimen (24h ago, onward)       Start     Ordered    01/17/25 1001  Specimen to Pathology, Surgery Gynecology and Obstetrics  Once        Comments: Pre-op Diagnosis: Status post hysteroscopy [Z98.890]Complex endometrial hyperplasia [N85.01]PMB (postmenopausal bleeding) [N95.0]Cervical stenosis (uterine cervix) [N88.2]Procedure(s):XI ROBOTIC HYSTERECTOMYXI ROBOTIC SALPINGO-OOPHORECTOMY Number of specimens: 1Name of specimens: uterus, cervix, bilateral tubes and ovaries     References:    Click here for ordering Quick Tip   Question Answer Comment   Procedure Type: Gynecology and Obstetrics    Specimen Class: Known or suspected malignancy    Release to patient Immediate        01/17/25 1001                    Discharge Provider: Luba Valdez    Diagnoses:  Active Hospital Problems    Diagnosis  POA    *S/p RA-TLH/BSO [Z90.710]  No      Resolved Hospital Problems   No resolved problems to display.       Discharged Condition: good    Hospital Course:   Patient was admitted for  outpatient procedure as above, and tolerated the procedure well with no complications. Please see operative report for further details. Following the procedure, the patient was awakened from anesthesia and transferred to the recovery area in stable condition. She was discharged to home once ambulating, voiding, tolerating PO intake, and pain was well-controlled. Patient was given routine post-op instructions and prescriptions for pain medication to take as needed. Patient instructed to follow up with Dr. Bradley as scheduled.    Final Diagnoses: Same as principal problem.    Disposition: Home or Self Care    Follow up/Patient Instructions:    Medications:  Reconciled Home Medications:      Medication List        START taking these medications      acetaminophen 650 MG Tbsr  Commonly known as: TYLENOL  Take 1 tablet (650 mg total) by mouth every 6 (six) hours as needed.     ibuprofen 600 MG tablet  Commonly known as: ADVIL,MOTRIN  Take 1 tablet (600 mg total) by mouth every 6 (six) hours as needed.     oxyCODONE 5 MG immediate release tablet  Commonly known as: ROXICODONE  Take 1 tablet (5 mg total) by mouth every 4 (four) hours as needed for Pain.     STIMULANT LAXATIVE PLUS 8.6-50 mg per tablet  Generic drug: senna-docusate 8.6-50 mg  Take 1 tablet by mouth daily as needed for Constipation.            CONTINUE taking these medications      * estradioL 1 MG tablet  Commonly known as: ESTRACE  Take 1 tablet (1 mg total) by mouth once daily.     * estradioL 0.01 % (0.1 mg/gram) vaginal cream  Commonly known as: ESTRACE  Place 1 g vaginally twice a week.     fexofenadine 60 MG tablet  Commonly known as: ALLEGRA  Take 60 mg by mouth once daily.     FLUoxetine 20 MG capsule  Take 20 mg by mouth.     fluticasone propionate 50 mcg/actuation nasal spray  Commonly known as: FLONASE  1 spray (50 mcg total) by Each Nostril route once daily.     progesterone 100 MG capsule  Commonly known as: PROMETRIUM  Take 1 capsule (100 mg  total) by mouth nightly.     tretinoin 0.05 % cream  Commonly known as: RETIN-A     UNABLE TO FIND  Apply 0.25 g topically Daily. Testosterone 2% gel           * This list has 2 medication(s) that are the same as other medications prescribed for you. Read the directions carefully, and ask your doctor or other care provider to review them with you.                Discharge Procedure Orders   Diet general     Lifting restrictions   Order Comments: No lifting greater than 15 pounds for six weeks.     Other restrictions (specify):   Order Comments: PELVIC REST:  No douching, tampons, or intercourse for 6 weeks.    If prescribed, vaginal estrogen cream may be used during the postoperative period.     DRIVING:  No driving while on narcotics. Driving may be resumed initially with a competent passenger one to two weeks after surgery if no longer taking narcotics.     EXERCISE:  For six weeks your exercise should be limited to walking. You may walk as far as you wish, as long as you increase your level of exertion gradually and avoid slippery surfaces. You may climb stairs as needed to get around, but should not use stair climbing for exercise.     Remove dressing in 24 hours   Order Comments: If you have a bandage on wound, you may remove it the day after dismissal.  If you had steri-strips remove them once they begin to peel off (usually 2 weeks). Keep incision clean and dry.  Inspect the incision daily for signs and symptoms of infection.     Wound care routine (specify)   Order Comments: WOUND CARE:  If you have a band-aid or bandage on your wound, you may remove it the day after dismissal.  If you had steri-strips remove them once they begin to peel off (usually 2 weeks).  If your steri-strips still haven't come off in 2 weeks, please remove them. You may wash the wound with mild soap and water.   You may shower at any time but should avoid immersing any abdominal incisions in water for at least two weeks after surgery  or until the wound is completely healed. If given, please shower with Hibiclens soap until bottle is completely finished. Keep your wound clean and dry.  You should observe your incision for signs of infection which include redness, warmth, drainage or fever.     Call MD for:  temperature >100.4     Call MD for:  persistent nausea and vomiting     Call MD for:  severe uncontrolled pain     Call MD for:  difficulty breathing, headache or visual disturbances     Call MD for:  redness, tenderness, or signs of infection (pain, swelling, redness, odor or green/yellow discharge around incision site)     Call MD for:  hives     Call MD for:   Order Comments: inability to void,urine is ketchup colored or you have large clots, vaginal bleeding is heavier than a period.    VAGINAL DISCHARGE: You may develop a vaginal discharge and intermittent vaginal spotting after surgery and up to 6 weeks postoperatively.  The discharge may have an odor and may change in color but it is normal.  This is due to dissolving stiches.  Contact your surgical team if you develop vaginal or vulvar irritation along with a discharge.  Also contact your surgical team if you have vaginal discharge that smells like urine or stool.    CONSTIPATION REMEDIES: Patients are often constipated after surgery or with use of oral narcotic medicine. You should continue to take the stool softener, Senokot-S during the next six weeks, and consume adequate amounts of water.  If you have not had a bowel movement for 3 days after dismissal, or are uncomfortable and unable to pass stool, please try one or all of the following measures:  1.  Milk of Magnesia - 30 cc by mouth every 12 hours   2.  Dulcolax suppository - One suppository per rectum every 4-6 hours   3.  Metamucil, Fibercon or other bulk former - use as directed  4.  Fleets Enema        PAIN MEDICATIONS:     Take your pain medications as instructed. It is best to take pain medications before your pain  becomes severe. This will allow you to take less medication yet have better pain relief. For the first 2 or 3 days it may be helpful to take your pain medications on a regular schedule (e.g. every 4 to 6 hours). This will help you to keep your pain under better control. You should then begin to take fewer medications each day until you no longer need them. Do not take pain medication on an empty stomach. This may lead to nausea and vomiting.     Activity as tolerated   Order Comments: Return to normal activity slowly as you feel able.  For 6 weeks your exercise should be limited to walking.  You may walk as far as you wish, as long as you increase your level of exertion gradually and avoid slippery surfaces.    If you had a hysterectomy at the surgery do not insert anything in your vagina for 9 weeks.     Shower on day dressing removed (No bath)   Order Comments: You may shower at any time but should avoid immersing any abdominal incisions in water for at least 2 weeks after surgery or until the wound is completely healed.  If given, please shower with Hibaclens soap until bottle is completely finish      Follow-up Information       Florencia Bradley MD Follow up on 2/5/2025.    Specialty: Gynecologic Oncology  Why: Post-op Visit  Contact information:  [address]  [facility] [address]  [phone]               Florencia Bradley MD Follow up on 2/26/2025.    Specialty: Gynecologic Oncology  Why: Post-op Visit  Contact information:  [address]  [facility] [address]  [phone]                               Luba Valdez MD  OBGYN, PGY-4

## 2025-01-17 NOTE — ANESTHESIA PREPROCEDURE EVALUATION
01/17/2025  Lakia Wright is a 59 y.o., female.      Pre-op Assessment    I have reviewed the Patient Summary Reports.     I have reviewed the Nursing Notes. I have reviewed the NPO Status.   I have reviewed the Medications.     Review of Systems  Anesthesia Hx:  No problems with previous Anesthesia             Denies Family Hx of Anesthesia complications.    Denies Personal Hx of Anesthesia complications.                    Social:  Non-Smoker       Hematology/Oncology:  Hematology Normal   Oncology Normal                                   EENT/Dental:  chronic allergic rhinitis           Cardiovascular:  Cardiovascular Normal                                              Pulmonary:  Pulmonary Normal                       Renal/:  Renal/ Normal                 Hepatic/GI:  Hepatic/GI Normal                    Musculoskeletal:  Musculoskeletal Normal                Neurological:  Neurology Normal                                      Endocrine:  Endocrine Normal            Dermatological:  Skin Normal    Psych:  Psychiatric History  depression                Physical Exam  General: Well nourished, Alert, Cooperative and Oriented    Airway:  Mallampati: II   Mouth Opening: Normal  Tongue: Normal  Neck ROM: Normal ROM    Dental:  Intact        Anesthesia Plan  Type of Anesthesia, risks & benefits discussed:    Anesthesia Type: Gen Supraglottic Airway  Intra-op Monitoring Plan: Standard ASA Monitors  Post Op Pain Control Plan: multimodal analgesia  Induction:  IV  Airway Plan: Video, Post-Induction  Informed Consent: Informed consent signed with the Patient and all parties understand the risks and agree with anesthesia plan.  All questions answered.   ASA Score: 2  Anesthesia Plan Notes: Labs in Epic    Ready For Surgery From Anesthesia Perspective.     .

## 2025-01-17 NOTE — OPERATIVE NOTE ADDENDUM
Certification of Assistant at Surgery       Surgery Date: 1/17/2025     Participating Surgeons:  Surgeons and Role:     * Florencia Bradley MD - Primary     * Mahsa Aguilar MD - Resident - Assisting    Procedures:  Procedure(s) (LRB):  XI ROBOTIC HYSTERECTOMY (N/A)  XI ROBOTIC SALPINGO-OOPHORECTOMY (Bilateral)    Assistant Surgeon's Certification of Necessity:  I understand that section 1842 (b) (6) (d) of the Social Security Act generally prohibits Medicare Part B reasonable charge payment for the services of assistants at surgery in teaching hospitals when qualified residents are available to furnish such services. I certify that the services for which payment is claimed were medically necessary, and that no qualified resident was available to perform the services. I further understand that these services are subject to post-payment review by the Medicare carrier.      RODO Morejon    01/17/2025  1:39 PM

## 2025-01-17 NOTE — ANESTHESIA POSTPROCEDURE EVALUATION
Anesthesia Post Evaluation    Patient: Lakia Wright    Procedure(s) Performed: Procedure(s) (LRB):  XI ROBOTIC HYSTERECTOMY (N/A)  XI ROBOTIC SALPINGO-OOPHORECTOMY (Bilateral)    Final Anesthesia Type: general      Patient location during evaluation: PACU  Patient participation: Yes- Able to Participate  Level of consciousness: awake and alert  Post-procedure vital signs: reviewed and stable  Pain management: adequate  Airway patency: patent    PONV status at discharge: No PONV  Anesthetic complications: no      Cardiovascular status: blood pressure returned to baseline  Respiratory status: unassisted  Hydration status: euvolemic  Follow-up not needed.              Vitals Value Taken Time   BP 95/51 01/17/25 1116   Temp 36.6 °C (97.8 °F) 01/17/25 1011   Pulse 68 01/17/25 1118   Resp 13 01/17/25 1118   SpO2 100 % 01/17/25 1118   Vitals shown include unfiled device data.      No case tracking events are documented in the log.      Pain/[place] Score: Pain Rating Prior to Med Admin: 8 (1/17/2025 11:00 AM)  [place] Score: 9 (1/17/2025 11:11 AM)

## 2025-01-17 NOTE — TRANSFER OF CARE
"Anesthesia Transfer of Care Note    Patient: Lakia Wright    Procedure(s) Performed: Procedure(s) (LRB):  XI ROBOTIC HYSTERECTOMY (N/A)  XI ROBOTIC SALPINGO-OOPHORECTOMY (Bilateral)    Patient location: PACU    Anesthesia Type: general    Transport from OR: Transported from OR on 6-10 L/min O2 by face mask with adequate spontaneous ventilation    Post pain: adequate analgesia    Post assessment: no apparent anesthetic complications    Post vital signs: stable    Level of consciousness: awake    Nausea/Vomiting: no nausea/vomiting    Complications: none    Transfer of care protocol was followed      Last vitals: Visit Vitals  BP (!) 104/57 (BP Location: Left arm, Patient Position: Lying)   Pulse 63   Temp 36.6 °C (97.8 °F) (Temporal)   Resp 16   Ht 5' 1" (1.549 m)   Wt 57.6 kg (127 lb)   LMP 11/04/2014 (Approximate)   SpO2 99%   Breastfeeding No   BMI 24.00 kg/m²     "

## 2025-01-18 PROCEDURE — 25000003 PHARM REV CODE 250

## 2025-01-18 PROCEDURE — 63600175 PHARM REV CODE 636 W HCPCS: Mod: TB

## 2025-01-18 RX ADMIN — ACETAMINOPHEN 1000 MG: 500 TABLET, FILM COATED ORAL at 12:01

## 2025-01-18 RX ADMIN — KETOROLAC TROMETHAMINE 15 MG: 30 INJECTION, SOLUTION INTRAMUSCULAR; INTRAVENOUS at 12:01

## 2025-01-18 RX ADMIN — SENNOSIDES 8.6 MG: 8.6 TABLET, FILM COATED ORAL at 08:01

## 2025-01-18 RX ADMIN — KETOROLAC TROMETHAMINE 15 MG: 30 INJECTION, SOLUTION INTRAMUSCULAR; INTRAVENOUS at 06:01

## 2025-01-18 RX ADMIN — MUPIROCIN: 20 OINTMENT TOPICAL at 08:01

## 2025-01-18 RX ADMIN — ACETAMINOPHEN 1000 MG: 500 TABLET, FILM COATED ORAL at 06:01

## 2025-01-18 RX ADMIN — FLUOXETINE HYDROCHLORIDE 20 MG: 20 CAPSULE ORAL at 08:01

## 2025-01-18 NOTE — OR NURSING
Transported to [facility] via stretcher, accompanied by SUNITA Wilkins.  Assisted into bed.   notified of new room number.  SUNITA Quevedo at bedside.

## 2025-01-18 NOTE — NURSING
Patient discharged home. IV (x1) removed patient tolerated well. Spouse at bedside to transport patient home. Discharge instructions and education provided to patient and spouse. Patient and spouse verbalized understanding. All patient belongings packed in bags to be taken home. Per patient and spouse, meds delivered to bedside and taken home with patient's spouse Friday 1/17/2025. Patient denies chest pain or SOB. Patient seated at edge of bed awaiting wheelchair escort off unit to car for discharge.

## 2025-01-18 NOTE — PLAN OF CARE
Case Management Final Discharge Note      Discharge Disposition: Home    New DME ordered / company name: None    Relevant SDOH / Transition of Care Barriers:  None    Person available to provide assistance at home when needed and their contact information: Junaid & Cam Wright (spouse) [phone]    Scheduled followup appointment: Dr. Bradley/Post Op on 2-05-25 at 10:30AM.  Appointment was noted on AVS.    Referrals placed: None    Transportation: Spouse        Patient and family educated on discharge services and updated on DC plan. Bedside RN notified, patient clear to discharge from Case Management Perspective.      01/18/25 0957   Final Note   Assessment Type Final Discharge Note   Anticipated Discharge Disposition Home   What phone number can be called within the next 1-3 days to see how you are doing after discharge? [phone]   Hospital Resources/Appts/Education Provided Provided patient/caregiver with written discharge plan information;Appointments scheduled and added to AVS   Post-Acute Status   Discharge Delays None known at this time     [facility]urg ([address])  Discharge Final Note    Primary Care Provider: Aristeo Juárez MD    Expected Discharge Date: 1/18/2025    Final Discharge Note (most recent)       Final Note - 01/18/25 0957          Final Note    Assessment Type Final Discharge Note (P)      Anticipated Discharge Disposition Home or Self Care (P)      What phone number can be called within the next 1-3 days to see how you are doing after discharge? [phone] (P)      Hospital Resources/Appts/Education Provided Provided patient/caregiver with written discharge plan information;Appointments scheduled and added to AVS (P)         Post-Acute Status    Discharge Delays None known at this time (P)                      Important Message from Medicare             Contact Info       Florencia Bradley MD   Specialty: Gynecologic Oncology    [address] [place] [id number]   Phone:  [phone]       Next Steps: Follow up on 2/5/2025    Instructions: Post-op Visit    Florencia Bradley MD   Specialty: Gynecologic Oncology    [facility] [address]   Phone: [phone]       Next Steps: Follow up on 2/26/2025    Instructions: Post-op Visit

## 2025-01-18 NOTE — DISCHARGE SUMMARY
"Discharge Summary  Gynecology      Admit Date: 2025    Discharge Date: 2025     Attending Physician: Florencia Bradley MD    Principal Diagnoses:   Status post hysterectomy    Active Hospital Problems    Diagnosis  POA    *S/p RA-TLH/BSO [Z90.710]  No      Resolved Hospital Problems   No resolved problems to display.       Procedures:   Procedure(s) (LRB):  XI ROBOTIC HYSTERECTOMY (N/A)  XI ROBOTIC SALPINGO-OOPHORECTOMY (Bilateral)    Discharged Condition: good    Hospital Course:   Lakia Wright is a 59 y.o. y.o.  female who presented on 2025 for the above-listed procedures for the treatment of endometrial hyperplasia. Patient tolerated the procedure well and was admitted for post-operative care. Post-operative course was uncomplicated.  On day of discharge (POD#1), patient was in stable condition, having met all post-operative milestones. She was urinating spontaneously, ambulating, and tolerating a regular diet without nausea/vomiting. Pain was well-controlled on oral medication. She was discharged with medications and follow up as listed below.     Consults: None    Significant Diagnostic Studies:  No results for input(s): "WBC", "HGB", "HCT", "MCV", "PLT" in the last 168 hours.     Treatments:   1. Surgery as above    Disposition: Home or Self Care    Patient Instructions:   Current Discharge Medication List        START taking these medications    Details   acetaminophen (TYLENOL) 650 MG TbSR Take 1 tablet (650 mg total) by mouth every 6 (six) hours as needed.  Qty: 60 tablet, Refills: 0      ibuprofen (ADVIL,MOTRIN) 600 MG tablet Take 1 tablet (600 mg total) by mouth every 6 (six) hours as needed.  Qty: 60 tablet, Refills: 0      oxyCODONE (ROXICODONE) 5 MG immediate release tablet Take 1 tablet (5 mg total) by mouth every 4 (four) hours as needed for Pain.  Qty: 12 tablet, Refills: 0    Comments: Quantity prescribed more than 7 day supply? No  Associated Diagnoses: Status post " hysterectomy      senna-docusate 8.6-50 mg (STIMULANT LAXATIVE PLUS) 8.6-50 mg per tablet Take 1 tablet by mouth daily as needed for Constipation.  Qty: 60 tablet, Refills: 0           CONTINUE these medications which have NOT CHANGED    Details   estradioL (ESTRACE) 0.01 % (0.1 mg/gram) vaginal cream Place 1 g vaginally twice a week.  Qty: 43 g, Refills: 3    Associated Diagnoses: Postmenopausal atrophic vaginitis      estradioL (ESTRACE) 1 MG tablet Take 1 tablet (1 mg total) by mouth once daily.  Qty: 90 tablet, Refills: 3    Associated Diagnoses: Menopausal syndrome      fexofenadine (ALLEGRA) 60 MG tablet Take 60 mg by mouth once daily.      FLUoxetine 20 MG capsule Take 20 mg by mouth.      fluticasone propionate (FLONASE) 50 mcg/actuation nasal spray 1 spray (50 mcg total) by Each Nostril route once daily.  Qty: 16 g, Refills: 3    Associated Diagnoses: Nasal congestion; Sinus congestion      progesterone (PROMETRIUM) 100 MG capsule Take 1 capsule (100 mg total) by mouth nightly.  Qty: 90 capsule, Refills: 3    Associated Diagnoses: Menopausal syndrome      tretinoin (RETIN-A) 0.05 % cream       UNABLE TO FIND Apply 0.25 g topically Daily. Testosterone 2% gel  Qty: 30 g, Refills: 2    Associated Diagnoses: Menopausal syndrome             Discharge Procedure Orders   Diet general     Lifting restrictions   Order Comments: No lifting greater than 15 pounds for six weeks.     Other restrictions (specify):   Order Comments: PELVIC REST:  No douching, tampons, or intercourse for 6 weeks.    If prescribed, vaginal estrogen cream may be used during the postoperative period.     DRIVING:  No driving while on narcotics. Driving may be resumed initially with a competent passenger one to two weeks after surgery if no longer taking narcotics.     EXERCISE:  For six weeks your exercise should be limited to walking. You may walk as far as you wish, as long as you increase your level of exertion gradually and avoid slippery  surfaces. You may climb stairs as needed to get around, but should not use stair climbing for exercise.     Remove dressing in 24 hours   Order Comments: If you have a bandage on wound, you may remove it the day after dismissal.  If you had steri-strips remove them once they begin to peel off (usually 2 weeks). Keep incision clean and dry.  Inspect the incision daily for signs and symptoms of infection.     Wound care routine (specify)   Order Comments: WOUND CARE:  If you have a band-aid or bandage on your wound, you may remove it the day after dismissal.  If you had steri-strips remove them once they begin to peel off (usually 2 weeks).  If your steri-strips still haven't come off in 2 weeks, please remove them. You may wash the wound with mild soap and water.   You may shower at any time but should avoid immersing any abdominal incisions in water for at least two weeks after surgery or until the wound is completely healed. If given, please shower with Hibiclens soap until bottle is completely finished. Keep your wound clean and dry.  You should observe your incision for signs of infection which include redness, warmth, drainage or fever.     Call MD for:  temperature >100.4     Call MD for:  persistent nausea and vomiting     Call MD for:  severe uncontrolled pain     Call MD for:  difficulty breathing, headache or visual disturbances     Call MD for:  redness, tenderness, or signs of infection (pain, swelling, redness, odor or green/yellow discharge around incision site)     Call MD for:  hives     Call MD for:   Order Comments: inability to void,urine is ketchup colored or you have large clots, vaginal bleeding is heavier than a period.    VAGINAL DISCHARGE: You may develop a vaginal discharge and intermittent vaginal spotting after surgery and up to 6 weeks postoperatively.  The discharge may have an odor and may change in color but it is normal.  This is due to dissolving stiches.  Contact your surgical team  if you develop vaginal or vulvar irritation along with a discharge.  Also contact your surgical team if you have vaginal discharge that smells like urine or stool.    CONSTIPATION REMEDIES: Patients are often constipated after surgery or with use of oral narcotic medicine. You should continue to take the stool softener, Senokot-S during the next six weeks, and consume adequate amounts of water.  If you have not had a bowel movement for 3 days after dismissal, or are uncomfortable and unable to pass stool, please try one or all of the following measures:  1.  Milk of Magnesia - 30 cc by mouth every 12 hours   2.  Dulcolax suppository - One suppository per rectum every 4-6 hours   3.  Metamucil, Fibercon or other bulk former - use as directed  4.  Fleets Enema        PAIN MEDICATIONS:     Take your pain medications as instructed. It is best to take pain medications before your pain becomes severe. This will allow you to take less medication yet have better pain relief. For the first 2 or 3 days it may be helpful to take your pain medications on a regular schedule (e.g. every 4 to 6 hours). This will help you to keep your pain under better control. You should then begin to take fewer medications each day until you no longer need them. Do not take pain medication on an empty stomach. This may lead to nausea and vomiting.     Activity as tolerated   Order Comments: Return to normal activity slowly as you feel able.  For 6 weeks your exercise should be limited to walking.  You may walk as far as you wish, as long as you increase your level of exertion gradually and avoid slippery surfaces.    If you had a hysterectomy at the surgery do not insert anything in your vagina for 9 weeks.     Shower on day dressing removed (No bath)   Order Comments: You may shower at any time but should avoid immersing any abdominal incisions in water for at least 2 weeks after surgery or until the wound is completely healed.  If given, please  shower with Hibaclens soap until bottle is completely finish        Follow-up Information       Florencia Bradley MD Follow up on 2/5/2025.    Specialty: Gynecologic Oncology  Why: Post-op Visit  Contact information:  [address]  [facility] [address]  [phone]               Florencia Bradley MD Follow up on 2/26/2025.    Specialty: Gynecologic Oncology  Why: Post-op Visit  Contact information:  [address]  [facility] [address]  [phone]                             Luba Valdez MD  OBGYN, PGY-4

## 2025-01-18 NOTE — PROGRESS NOTES
[facility]  Obstetrics & Gynecology  Progress Note    Patient Name: Lakia Wright  MRN: [id number]  Admission Date: 1/17/2025  Primary Care Provider: Aristeo Juárez MD  Principal Problem: Status post hysterectomy    Subjective:     Interval History: POD #1 s/p RA-TLH/BSO for endometrial hyperplasia.    NAEON. Patient reports feeling well. Some increased pain overnight, improved this AM. Voiding and ambulating independently. Tolerating small amounts PO without N/V. +flatus, -BM.     Scheduled Meds:   acetaminophen  1,000 mg Oral Q6H    cyclobenzaprine        [START ON 1/18/2025] FLUoxetine  20 mg Oral Daily    ketorolac  15 mg Intravenous Q6H    Followed by    [START ON 1/18/2025] ibuprofen  600 mg Oral Q6H    mupirocin   Nasal BID    senna  8.6 mg Oral BID     Continuous Infusions:   lactated ringers   Intravenous Continuous         PRN Meds:  Current Facility-Administered Medications:     calcium carbonate, 1,000 mg, Oral, TID PRN    cyclobenzaprine, , ,     diphenhydrAMINE, 25 mg, Intravenous, Q6H PRN    HYDROmorphone, 0.4 mg, Intravenous, Q2H PRN    melatonin, 6 mg, Oral, Nightly PRN    naloxone, 0.02 mg, Intravenous, PRN    ondansetron, 4 mg, Intravenous, Q6H PRN    oxyCODONE, 10 mg, Oral, Q4H PRN    oxyCODONE, 5 mg, Oral, Q4H PRN    prochlorperazine, 5 mg, Intravenous, Q6H PRN    simethicone, 1 tablet, Oral, TID PRN    Review of patient's allergies indicates:  No Known Allergies    Objective:     Vital Signs (Most Recent):  Temp: 97.4 °F (36.3 °C) (01/17/25 1155)  Pulse: 67 (01/17/25 1755)  Resp: 16 (01/17/25 1755)  BP: 128/61 (01/17/25 1755)  SpO2: 96 % (01/17/25 1755) Vital Signs (24h Range):  Temp:  [97.4 °F (36.3 °C)-97.9 °F (36.6 °C)] 97.4 °F (36.3 °C)  Pulse:  [59-77] 67  Resp:  [14-20] 16  SpO2:  [87 %-100 %] 96 %  BP: ()/(51-66) 128/61     Weight: 57.6 kg (127 lb)  Body mass index is 24 kg/m².  Patient's last menstrual period was 11/04/2014 (approximate).    I&O (Last  24H):    Intake/Output Summary (Last 24 hours) at 1/17/2025 1850  Last data filed at 1/17/2025 1755  Gross per 24 hour   Intake 3920 ml   Output 605 ml   Net 3315 ml       Physical Exam:   Constitutional: She is oriented to person, place, and time. She appears well-developed and well-nourished. No distress.    HENT:   Head: Normocephalic and atraumatic.    Eyes: EOM are normal.     Cardiovascular:  Normal rate.             Pulmonary/Chest: Effort normal.        Abdominal: Soft. She exhibits abdominal incision (port incisions with steri strips and bandages. Small amount of bruising surrounding incisions). There is no rebound and no guarding.     Genitourinary:    Genitourinary Comments: Deferred             Musculoskeletal: Normal range of motion.       Neurological: She is alert and oriented to person, place, and time.    Skin: Skin is warm and dry.    Psychiatric: She has a normal mood and affect. Her behavior is normal.       Laboratory:  No results found for this or any previous visit (from the past 24 hours).      Diagnostic Results:  N/a    Assessment/Plan:     Active Diagnoses:    Diagnosis Date Noted POA    PRINCIPAL PROBLEM:  S/p RA-TLH/BSO [Z90.710] 01/17/2025 No      Problems Resolved During this Admission:       Postop State   - Procedure complications: none  - EBL: 25 mL  - IVF @ 40 cc/h; d/c when tolerating PO  - Diet: regular  - Pain control: Toradol>Ibuprofen, Tylenol, Oxy 5/10  - In/Out: UOP: 0.8 cc/kg/hr  - Bowel function: +flatus/-BM   - PRN: simethicone, zofran, phenergan, benadryl  - PPX: ambulation encouraged, IS encouraged, TEDs/SCDs in place,    2. Depression  - Continue home medications     As patient is meeting postop milestones will plan for discharge today    Luba Valdez MD  Obstetrics & Gynecology  [facility] ([place])

## 2025-01-18 NOTE — PLAN OF CARE
Adequate for Discharge      Problem: Adult Inpatient Plan of Care  Goal: Plan of Care Review  Outcome: Met  Goal: Patient-Specific Goal (Individualized)  Outcome: Met  Goal: Absence of Hospital-Acquired Illness or Injury  Outcome: Met  Goal: Optimal Comfort and Wellbeing  Outcome: Met  Goal: Readiness for Transition of Care  Outcome: Met     Problem: Infection  Goal: Absence of Infection Signs and Symptoms  Outcome: Met     Problem: Wound  Goal: Optimal Coping  Outcome: Met  Goal: Optimal Functional Ability  Outcome: Met  Goal: Absence of Infection Signs and Symptoms  Outcome: Met  Goal: Improved Oral Intake  Outcome: Met  Goal: Optimal Pain Control and Function  Outcome: Met  Goal: Skin Health and Integrity  Outcome: Met  Goal: Optimal Wound Healing  Outcome: Met     Problem: Fall Injury Risk  Goal: Absence of Fall and Fall-Related Injury  Outcome: Met

## 2025-01-18 NOTE — PLAN OF CARE
completed discharge assessment with pt. Pt lives with . Demographics, PCP, and insurance verified. No home health. No dialysis. Pt completes ADLs without assistance. Pt to discharge home via family transport. Pt has no other needs to be addressed at this time.    [facility] ([address])  Initial Discharge Assessment       Primary Care Provider: Aristeo Juárez MD    Admission Diagnosis: Status post hysteroscopy [Z98.890]  Complex endometrial hyperplasia [N85.01]  PMB (postmenopausal bleeding) [N95.0]  Cervical stenosis (uterine cervix) [N88.2]  Complex endometrial hyperplasia without atypia [N85.01]  Status post hysterectomy [Z90.710]    Admission Date: 1/17/2025  Expected Discharge Date: 1/18/2025    Transition of Care Barriers: (P) None    Payor: [employer] / Plan: [place] [employer] ([employer]) / Product Type: Commercial /     Extended Emergency Contact Information  Primary Emergency Contact: Jd Wright  Address: [address] [place] [address] [place] [address] [place]  Mobile Phone: [phone]  Relation: Spouse  Preferred language: English   needed? No  Secondary Emergency Contact: Ninoska Wright   [place]  Mobile Phone: [phone]  Relation: Daughter    Discharge Plan A: (P) Home with family         [facility] - [place], [place] - [address]  [address]  [facility] [address]  Phone: [phone] Fax: [phone]    [id number] [place] [facility] [address] AT [facility] [address]  [address]  [place] [address]  Phone: [phone] Fax: [phone]    [place], [place] - [address] [facility]  [address] [facility] [address]  Phone: [phone] Fax: [phone]    [facility]  [address]  [facility] [address]  Phone: [phone] Fax: [phone]    [facility]  [address]  [facility]  [address]  Phone: [phone] Fax: [phone]      Initial Assessment (most recent)       Adult Discharge Assessment - 01/18/25 1136          Discharge Assessment    Assessment Type Discharge Planning Assessment (P)      Confirmed/corrected address, phone number and insurance Yes (P)      Confirmed Demographics Correct on Facesheet (P)      Source of Information patient (P)      People in Home spouse (P)      Do you expect to return to your current living situation? Yes (P)      Do you have help at home or someone to help you manage your care at home? No (P)      Prior to hospitilization cognitive status: Alert/Oriented (P)      Current cognitive status: Alert/Oriented (P)      Walking or Climbing Stairs Difficulty no (P)      Dressing/Bathing Difficulty no (P)      Equipment Currently Used at Home none (P)      Readmission within 30 days? No (P)      Patient currently being followed by outpatient case management? No (P)      Do you currently have service(s) that help you manage your care at home? No (P)      Do you take prescription medications? Yes (P)      Do you have prescription coverage? Yes (P)      Coverage [employer] ([employer]) (P)      Do you have any problems affording any of your prescribed medications? No (P)      Is the patient taking medications as prescribed? yes (P)      Who is going to help you get home at discharge? Jd Wright [phone] (P)      How do you get to doctors appointments? car, drives self;family or friend will provide (P)      Are you on dialysis? No (P)      Do you take coumadin? No (P)      Discharge Plan A Home with family (P)      DME Needed Upon Discharge  none (P)      Discharge Plan discussed with: Patient (P)      Transition of Care Barriers None (P)

## 2025-01-19 VITALS
RESPIRATION RATE: 12 BRPM | BODY MASS INDEX: 23.98 KG/M2 | HEIGHT: 61 IN | OXYGEN SATURATION: 92 % | WEIGHT: 127 LBS | SYSTOLIC BLOOD PRESSURE: 118 MMHG | DIASTOLIC BLOOD PRESSURE: 62 MMHG | HEART RATE: 72 BPM | TEMPERATURE: 98 F

## 2025-01-23 ENCOUNTER — PATIENT MESSAGE (OUTPATIENT)
Dept: OBSTETRICS AND GYNECOLOGY | Facility: CLINIC | Age: 60
End: 2025-01-23
Payer: COMMERCIAL

## 2025-01-23 ENCOUNTER — PATIENT MESSAGE (OUTPATIENT)
Dept: GYNECOLOGIC ONCOLOGY | Facility: CLINIC | Age: 60
End: 2025-01-23
Payer: COMMERCIAL

## 2025-01-27 LAB
COMMENT: NORMAL
FINAL PATHOLOGIC DIAGNOSIS: NORMAL
GROSS: NORMAL
Lab: NORMAL
MICROSCOPIC EXAM: NORMAL

## 2025-01-28 ENCOUNTER — TELEPHONE (OUTPATIENT)
Dept: GYNECOLOGIC ONCOLOGY | Facility: CLINIC | Age: 60
End: 2025-01-28
Payer: COMMERCIAL

## 2025-01-28 NOTE — TELEPHONE ENCOUNTER
Called to check on patient following surgery. Reports doing well at home. No concerns. Final pathology reviewed and benign. Encouraged to call with any questions.

## 2025-02-04 NOTE — PROGRESS NOTES
Subjective     Patient ID: Lakia Wright is a 60 y.o. female.    Chief Complaint: Post-op Evaluation (2 weeks post op)    HPI    Presents today for post operative evaluation, s/p RALH BSO on 1/17/2025 for complex hyperplasia without atypia.    Final pathology EIN    She is recovering appropriately from surgery. Up and about, eating. +BM    History:  59 yr old para 2 referred from Dr. Naranjo for complex hyperplasia without atypia. Work up for PMB with thickened endometrium.     10/1/2024 EMB  Small fragments of inactive endometrium      11/15/2024 hysteroscopy, D&C. Uterus sounded to 7cm. A very long endometrial polyp was noted arising from the left tubal ostia      1. Endometrium, polyp, polypectomy:   - Focal complex hyperplasia without atypia, arising in an endometrial polyp   - Negative for atypia     2. Endometrium, curettage:   - Focal complex hyperplasia without atypia, arising in an endometrial polyp   - Background early secretory phase endometrium   - Negative for atypia   Comment: both samples negative for atypical hyperplasia     LMP     No medical co morbidities. BMI 23     Prior c section x 2 (LTV incision)     Family history negative for breast/gyn/colon cancer    Review of Systems   Constitutional:  Negative for appetite change, chills, diaphoresis, fatigue, fever and unexpected weight change.   Respiratory:  Negative for chest tightness, shortness of breath and wheezing.    Cardiovascular:  Negative for chest pain, palpitations and leg swelling.   Gastrointestinal:  Negative for abdominal pain, constipation, diarrhea, nausea and vomiting.   Genitourinary:  Negative for difficulty urinating, dysuria, flank pain, frequency, hematuria, pelvic pain, urgency, vaginal bleeding, vaginal discharge and vaginal pain.   Musculoskeletal:  Negative for back pain.   Integumentary:  Negative for color change.   Neurological:  Negative for dizziness, light-headedness, numbness and headaches.  "  Psychiatric/Behavioral:  Negative for agitation. The patient is not nervous/anxious.      BP (!) 118/59 (Patient Position: Sitting)   Pulse 63   Temp 98 °F (36.7 °C)   Ht 5' 1" (1.549 m)   Wt 56.9 kg (125 lb 8 oz)   LMP 11/04/2014 (Approximate)   SpO2 97%   BMI 23.71 kg/m²        Objective     Physical Exam  Vitals and nursing note reviewed.   Constitutional:       General: She is not in acute distress.     Appearance: Normal appearance. She is well-developed. She is not diaphoretic.   HENT:      Head: Normocephalic and atraumatic.   Eyes:      General: No scleral icterus.  Pulmonary:      Effort: Pulmonary effort is normal. No respiratory distress.   Abdominal:      General: There is no distension.      Palpations: Abdomen is soft.      Comments: 5 robotic trocar sites C/D/I   Musculoskeletal:         General: Normal range of motion.      Cervical back: Normal range of motion.      Right lower leg: No edema.      Left lower leg: No edema.   Skin:     General: Skin is warm and dry.      Coloration: Skin is not pale.      Findings: No rash.   Neurological:      Mental Status: She is alert and oriented to person, place, and time.   Psychiatric:         Mood and Affect: Mood normal.         Behavior: Behavior normal.            Assessment and Plan     1. Complex endometrial hyperplasia    2. S/p RA-TLH/BSO        Healing appropriately from surgery  Fortunately benign pathology. Reviewed and copy of report provided.  Gradual increase of physical activity  Continue pelvic rest. May resume baths.  RTC for post operative evaluation and vaginal cuff exam           No follow-ups on file.    "

## 2025-02-05 ENCOUNTER — OFFICE VISIT (OUTPATIENT)
Dept: GYNECOLOGIC ONCOLOGY | Facility: CLINIC | Age: 60
End: 2025-02-05
Payer: COMMERCIAL

## 2025-02-05 VITALS
WEIGHT: 125.5 LBS | HEART RATE: 63 BPM | BODY MASS INDEX: 23.7 KG/M2 | HEIGHT: 61 IN | OXYGEN SATURATION: 97 % | TEMPERATURE: 98 F | SYSTOLIC BLOOD PRESSURE: 118 MMHG | DIASTOLIC BLOOD PRESSURE: 59 MMHG

## 2025-02-05 DIAGNOSIS — Z90.710 STATUS POST HYSTERECTOMY: ICD-10-CM

## 2025-02-05 DIAGNOSIS — N85.01 COMPLEX ENDOMETRIAL HYPERPLASIA: Primary | ICD-10-CM

## 2025-02-20 ENCOUNTER — TELEPHONE (OUTPATIENT)
Dept: OBSTETRICS AND GYNECOLOGY | Facility: CLINIC | Age: 60
End: 2025-02-20
Payer: COMMERCIAL

## 2025-02-20 NOTE — TELEPHONE ENCOUNTER
----- Message from Live sent at 2/20/2025 12:27 PM CST -----  Who Called: Flori New Prescription or Refill : RefillRX Name and Strength:   Testosterone 2% Local or Mail Order : Local   Mail Order  Preferred Pharmacy:[facility] [address] the patient rather a call back or a response via [facility]? [facility] Call Back Number:  [phone] Information: None

## 2025-02-21 DIAGNOSIS — N95.1 MENOPAUSAL SYNDROME: ICD-10-CM

## 2025-02-26 ENCOUNTER — OFFICE VISIT (OUTPATIENT)
Dept: GYNECOLOGIC ONCOLOGY | Facility: CLINIC | Age: 60
End: 2025-02-26
Payer: COMMERCIAL

## 2025-02-26 VITALS
RESPIRATION RATE: 18 BRPM | OXYGEN SATURATION: 99 % | WEIGHT: 126 LBS | HEIGHT: 61 IN | DIASTOLIC BLOOD PRESSURE: 56 MMHG | HEART RATE: 54 BPM | BODY MASS INDEX: 23.79 KG/M2 | SYSTOLIC BLOOD PRESSURE: 128 MMHG | TEMPERATURE: 98 F

## 2025-02-26 DIAGNOSIS — Z90.710 STATUS POST HYSTERECTOMY: ICD-10-CM

## 2025-02-26 DIAGNOSIS — N85.01 COMPLEX ENDOMETRIAL HYPERPLASIA: Primary | ICD-10-CM

## 2025-02-26 PROCEDURE — 99024 POSTOP FOLLOW-UP VISIT: CPT | Mod: S$GLB,,, | Performed by: OBSTETRICS & GYNECOLOGY

## 2025-02-26 PROCEDURE — 99999 PR PBB SHADOW E&M-EST. PATIENT-LVL IV: CPT | Mod: PBBFAC,,, | Performed by: OBSTETRICS & GYNECOLOGY

## 2025-02-26 PROCEDURE — 1159F MED LIST DOCD IN RCRD: CPT | Mod: CPTII,S$GLB,, | Performed by: OBSTETRICS & GYNECOLOGY

## 2025-02-26 PROCEDURE — 3078F DIAST BP <80 MM HG: CPT | Mod: CPTII,S$GLB,, | Performed by: OBSTETRICS & GYNECOLOGY

## 2025-02-26 PROCEDURE — 3074F SYST BP LT 130 MM HG: CPT | Mod: CPTII,S$GLB,, | Performed by: OBSTETRICS & GYNECOLOGY

## 2025-02-26 NOTE — PROGRESS NOTES
Subjective     Patient ID: Lakia Wright is a 60 y.o. female.    Chief Complaint: Post-op Evaluation (6 week post op /)    HPI    Presents today for post operative evaluation, s/p KHUSHIH BSO on 1/17/2025 for complex hyperplasia without atypia.    Final pathology EIN    She continues to recover appropriately from surgery.     History:  59 yr old para 2 referred from Dr. Naranjo for complex hyperplasia without atypia. Work up for PMB with thickened endometrium.     10/1/2024 EMB  Small fragments of inactive endometrium      11/15/2024 hysteroscopy, D&C. Uterus sounded to 7cm. A very long endometrial polyp was noted arising from the left tubal ostia      1. Endometrium, polyp, polypectomy:   - Focal complex hyperplasia without atypia, arising in an endometrial polyp   - Negative for atypia     2. Endometrium, curettage:   - Focal complex hyperplasia without atypia, arising in an endometrial polyp   - Background early secretory phase endometrium   - Negative for atypia   Comment: both samples negative for atypical hyperplasia     LMP     No medical co morbidities. BMI 23     Prior c section x 2 (LTV incision)     Family history negative for breast/gyn/colon cancer    Review of Systems   Constitutional:  Negative for appetite change, chills, diaphoresis, fatigue, fever and unexpected weight change.   Respiratory:  Negative for chest tightness, shortness of breath and wheezing.    Cardiovascular:  Negative for chest pain, palpitations and leg swelling.   Gastrointestinal:  Negative for abdominal pain, constipation, diarrhea, nausea and vomiting.   Genitourinary:  Negative for difficulty urinating, dysuria, flank pain, frequency, hematuria, pelvic pain, urgency, vaginal bleeding, vaginal discharge and vaginal pain.   Musculoskeletal:  Negative for back pain.   Integumentary:  Negative for color change.   Neurological:  Negative for dizziness, light-headedness, numbness and headaches.   Psychiatric/Behavioral:  Negative  "for agitation. The patient is not nervous/anxious.      BP (!) 128/56 (BP Location: Right arm, Patient Position: Sitting)   Pulse (!) 54   Temp 97.6 °F (36.4 °C) (Oral)   Resp 18   Ht 5' 1" (1.549 m)   Wt 57.2 kg (126 lb)   LMP 11/04/2014 (Approximate)   SpO2 99%   BMI 23.81 kg/m²        Objective     Physical Exam  Vitals and nursing note reviewed. Exam conducted with a chaperone present.   Constitutional:       General: She is not in acute distress.     Appearance: Normal appearance. She is well-developed. She is not diaphoretic.   HENT:      Head: Normocephalic and atraumatic.   Eyes:      General: No scleral icterus.  Pulmonary:      Effort: Pulmonary effort is normal. No respiratory distress.   Abdominal:      General: There is no distension.      Palpations: Abdomen is soft.      Comments: 5 robotic trocar sites C/D/I   Genitourinary:     Vagina: Normal.      Comments: Vaginal cuff healing appropriately   Musculoskeletal:         General: Normal range of motion.      Cervical back: Normal range of motion.      Right lower leg: No edema.      Left lower leg: No edema.   Skin:     General: Skin is warm and dry.      Coloration: Skin is not pale.      Findings: No rash.   Neurological:      Mental Status: She is alert and oriented to person, place, and time.   Psychiatric:         Mood and Affect: Mood normal.         Behavior: Behavior normal.            Assessment and Plan     1. Complex endometrial hyperplasia    2. S/p RA-TLH/BSO      Recovered appropriately from surgery  Fortunately benign pathology.   Will return care to primary providers.   May RTC with me as needed.            No follow-ups on file.      "

## 2025-06-02 ENCOUNTER — PATIENT MESSAGE (OUTPATIENT)
Dept: OBSTETRICS AND GYNECOLOGY | Facility: CLINIC | Age: 60
End: 2025-06-02
Payer: COMMERCIAL

## 2025-06-03 DIAGNOSIS — F39 MOOD DISORDER: Primary | ICD-10-CM

## 2025-06-03 RX ORDER — FLUOXETINE 20 MG/1
20 CAPSULE ORAL DAILY
Qty: 30 CAPSULE | Refills: 2 | Status: SHIPPED | OUTPATIENT
Start: 2025-06-03 | End: 2026-06-03

## 2025-07-01 ENCOUNTER — OFFICE VISIT (OUTPATIENT)
Dept: OBSTETRICS AND GYNECOLOGY | Facility: CLINIC | Age: 60
End: 2025-07-01
Attending: OBSTETRICS & GYNECOLOGY
Payer: COMMERCIAL

## 2025-07-01 ENCOUNTER — TELEPHONE (OUTPATIENT)
Dept: OBSTETRICS AND GYNECOLOGY | Facility: CLINIC | Age: 60
End: 2025-07-01

## 2025-07-01 VITALS
HEART RATE: 68 BPM | DIASTOLIC BLOOD PRESSURE: 71 MMHG | SYSTOLIC BLOOD PRESSURE: 111 MMHG | HEIGHT: 61 IN | WEIGHT: 120 LBS | BODY MASS INDEX: 22.66 KG/M2

## 2025-07-01 DIAGNOSIS — Z78.0 MENOPAUSE PRESENT: Primary | ICD-10-CM

## 2025-07-01 DIAGNOSIS — Z12.31 SCREENING MAMMOGRAM, ENCOUNTER FOR: ICD-10-CM

## 2025-07-01 DIAGNOSIS — N89.8 VAGINAL ITCHING: ICD-10-CM

## 2025-07-01 DIAGNOSIS — Z13.820 SCREENING FOR OSTEOPOROSIS: ICD-10-CM

## 2025-07-01 PROCEDURE — 1159F MED LIST DOCD IN RCRD: CPT | Mod: CPTII,S$GLB,, | Performed by: OBSTETRICS & GYNECOLOGY

## 2025-07-01 PROCEDURE — 99999 PR PBB SHADOW E&M-EST. PATIENT-LVL IV: CPT | Mod: PBBFAC,,, | Performed by: OBSTETRICS & GYNECOLOGY

## 2025-07-01 PROCEDURE — 3078F DIAST BP <80 MM HG: CPT | Mod: CPTII,S$GLB,, | Performed by: OBSTETRICS & GYNECOLOGY

## 2025-07-01 PROCEDURE — 99213 OFFICE O/P EST LOW 20 MIN: CPT | Mod: S$GLB,,, | Performed by: OBSTETRICS & GYNECOLOGY

## 2025-07-01 PROCEDURE — 3008F BODY MASS INDEX DOCD: CPT | Mod: CPTII,S$GLB,, | Performed by: OBSTETRICS & GYNECOLOGY

## 2025-07-01 PROCEDURE — 3074F SYST BP LT 130 MM HG: CPT | Mod: CPTII,S$GLB,, | Performed by: OBSTETRICS & GYNECOLOGY

## 2025-07-01 PROCEDURE — 1160F RVW MEDS BY RX/DR IN RCRD: CPT | Mod: CPTII,S$GLB,, | Performed by: OBSTETRICS & GYNECOLOGY

## 2025-07-01 NOTE — TELEPHONE ENCOUNTER
Called patient and assisted with scheduling her an annual appointment on 09/30/25 at 11:15 am. Patient said thanks.

## 2025-07-01 NOTE — PROGRESS NOTES
Subjective:       Patient ID: Lakia Wright is a 60 y.o. female.    Chief Complaint:  Vaginitis      History of Present Illness  HPI  This 61 y/o P2 presents today with complaints of vaginal itching for the past week. She used Monistat 1 yesterday. She has recently undergone TLHBSO for endometrial hyperplasia  with a final pathologic diagnosis of Endometrial hyperplasia with atypia.   She currently remains on Estrace 1mg and prometrium 100mg po daily , and using Vaginal estradiol cream, but admits to only using the cream about once a week.     GYN & OB History  Patient's last menstrual period was 2014 (approximate).   Date of Last Pap: No result found    OB History    Para Term  AB Living   2 2 2   2   SAB IAB Ectopic Multiple Live Births       2      # Outcome Date GA Lbr Kaz/2nd Weight Sex Type Anes PTL Lv   2 Term      CS-LTranv   NADEGE   1 Term      CS-LTranv   NADEGE       Past Medical History:   Diagnosis Date    Allergic rhinitis     Depression        Past Surgical History:   Procedure Laterality Date     SECTION, LOW TRANSVERSE      COLONOSCOPY N/A 2018    Procedure: COLONOSCOPY Clenpiq;  Surgeon: Maria Alejandra Laguna MD;  Location: [facility];  Service: Endoscopy;  Laterality: N/A;    HYSTEROSCOPY WITH DILATION AND CURETTAGE OF UTERUS N/A 11/15/2024    Procedure: [id number];  Surgeon: Bela Naranjo MD;  Location: [facility];  Service: OB/GYN;  Laterality: N/A;    ROBOT-ASSISTED LAPAROSCOPIC ABDOMINAL HYSTERECTOMY USING DA JARRETT XI N/A 2025    Procedure: XI ROBOTIC HYSTERECTOMY;  Surgeon: Florencia Bradley MD;  Location: [facility];  Service: Gynecology Oncology;  Laterality: N/A;  2 hr case    ROBOT-ASSISTED LAPAROSCOPIC SALPINGO-OOPHORECTOMY USING DA JARRETT XI Bilateral 2025    Procedure: XI ROBOTIC SALPINGO-OOPHORECTOMY;  Surgeon: Florencia Bradley MD;  Location: [facility];  Service: Gynecology Oncology;  Laterality: Bilateral;  "      Review of Systems  Review of Systems   Constitutional:  Negative for activity change, appetite change, fatigue and unexpected weight change.   HENT: Negative.     Eyes:  Negative for visual disturbance.   Respiratory:  Negative for shortness of breath and wheezing.    Cardiovascular:  Negative for chest pain, palpitations and leg swelling.   Gastrointestinal:  Negative for abdominal pain, bloating and blood in stool.   Endocrine: Negative for diabetes and hair loss.   Genitourinary:  Positive for vaginal discharge. Negative for decreased libido and dyspareunia.   Musculoskeletal:  Negative for back pain and joint swelling.   Integumentary:  Negative for acne, hair changes and nipple discharge.   Neurological:  Negative for headaches.   Hematological:  Does not bruise/bleed easily.   Psychiatric/Behavioral:  Negative for depression and sleep disturbance. The patient is not nervous/anxious.    Breast: Negative for mastodynia and nipple discharge          Objective:      /71 (Patient Position: Sitting)   Pulse 68   Ht 5' 1" (1.549 m)   Wt 54.4 kg (120 lb)   LMP 11/04/2014 (Approximate)   BMI 22.67 kg/m²   Physical Exam:               Genitourinary:    Pelvic exam was performed with patient supine.      Genitourinary Comments: PELVIC: Normal external female genitalia without lesions. Normal hair distribution. Adequate perineal body, normal urethral meatus. Vagina atrophic without lesions or discharge. No significant cystocele or rectocele. Bimanual exam shows uterus and cervix to be surgically absent. Adnexa without masses or tenderness.  RECTAL: Deferred                                Assessment:        1. Menopause present    2. Screening for osteoporosis    3. Screening mammogram, encounter for    4. Vaginal itching                Plan:      Problem List Items Addressed This Visit    None  Visit Diagnoses         Menopause present    -  Primary    Relevant Orders    DXA Bone Density Axial Skeleton 1 " or more sites      Screening for osteoporosis        Relevant Orders    DXA Bone Density Axial Skeleton 1 or more sites      Screening mammogram, encounter for        Relevant Orders    Mammo Digital Screening Bilat w/ Can (XPD)      Vaginal itching        Relevant Orders    Vaginosis Screen by DNA Probe             Follow up in about 3 months (around 10/1/2025).

## 2025-07-01 NOTE — TELEPHONE ENCOUNTER
----- Message from Bela Naranjo MD sent at 7/1/2025  1:22 PM CDT -----  Needs an annual/  3 months

## 2025-07-09 ENCOUNTER — HOSPITAL ENCOUNTER (OUTPATIENT)
Dept: RADIOLOGY | Facility: OTHER | Age: 60
Discharge: HOME | End: 2025-07-09
Attending: OBSTETRICS & GYNECOLOGY
Payer: COMMERCIAL

## 2025-07-09 DIAGNOSIS — Z78.0 MENOPAUSE PRESENT: ICD-10-CM

## 2025-07-09 DIAGNOSIS — Z13.820 SCREENING FOR OSTEOPOROSIS: ICD-10-CM

## 2025-07-09 PROCEDURE — 77080 DXA BONE DENSITY AXIAL: CPT | Mod: TC

## 2025-07-09 PROCEDURE — 77080 DXA BONE DENSITY AXIAL: CPT | Mod: 26,,, | Performed by: RADIOLOGY

## 2025-07-25 ENCOUNTER — PATIENT MESSAGE (OUTPATIENT)
Dept: FAMILY MEDICINE | Facility: CLINIC | Age: 60
End: 2025-07-25
Payer: COMMERCIAL

## 2025-07-25 DIAGNOSIS — R09.81 NASAL CONGESTION: ICD-10-CM

## 2025-07-25 DIAGNOSIS — R09.81 SINUS CONGESTION: ICD-10-CM

## 2025-07-25 RX ORDER — FLUTICASONE PROPIONATE 50 MCG
1 SPRAY, SUSPENSION (ML) NASAL DAILY
Qty: 16 G | Refills: 3 | Status: SHIPPED | OUTPATIENT
Start: 2025-07-25

## 2025-07-25 NOTE — TELEPHONE ENCOUNTER
No care due was identified.  [facility] Embedded Care Due Messages. Reference number: [id number].   7/25/2025 10:07:10 AM CDT

## 2025-08-01 ENCOUNTER — OFFICE VISIT (OUTPATIENT)
Dept: URGENT CARE | Facility: CLINIC | Age: 60
End: 2025-08-01
Payer: COMMERCIAL

## 2025-08-01 VITALS
OXYGEN SATURATION: 96 % | DIASTOLIC BLOOD PRESSURE: 67 MMHG | WEIGHT: 119.94 LBS | HEART RATE: 65 BPM | HEIGHT: 61 IN | BODY MASS INDEX: 22.64 KG/M2 | TEMPERATURE: 98 F | RESPIRATION RATE: 16 BRPM | SYSTOLIC BLOOD PRESSURE: 104 MMHG

## 2025-08-01 DIAGNOSIS — J01.90 ACUTE BACTERIAL SINUSITIS: Primary | ICD-10-CM

## 2025-08-01 DIAGNOSIS — B96.89 ACUTE BACTERIAL SINUSITIS: Primary | ICD-10-CM

## 2025-08-01 PROCEDURE — 99213 OFFICE O/P EST LOW 20 MIN: CPT | Mod: S$GLB,,, | Performed by: PHYSICIAN ASSISTANT

## 2025-08-01 RX ORDER — AMOXICILLIN AND CLAVULANATE POTASSIUM 875; 125 MG/1; MG/1
1 TABLET, FILM COATED ORAL EVERY 12 HOURS
Qty: 14 TABLET | Refills: 0 | Status: SHIPPED | OUTPATIENT
Start: 2025-08-01 | End: 2025-08-08

## 2025-08-01 RX ORDER — PREDNISONE 20 MG/1
40 TABLET ORAL DAILY
Qty: 8 TABLET | Refills: 0 | Status: SHIPPED | OUTPATIENT
Start: 2025-08-01 | End: 2025-08-05

## 2025-08-01 NOTE — PROGRESS NOTES
"Subjective:      Patient ID: Lakia Wright is a 60 y.o. female.    Vitals:  height is 5' 1" (1.549 m) and weight is 54.4 kg (119 lb 14.9 oz). Her oral temperature is 98 °F (36.7 °C). Her blood pressure is 104/67 and her pulse is 65. Her respiration is 16 and oxygen saturation is 96%.     Chief Complaint: Sinus Problem    Pt presents with sinus and sore throat. Symptoms have been present for over a week.    Patient provider note starts here:  Patient presents to the clinic with complaints of having sinus pressure and pain, sore throat, postnasal drip for over a week now.  She has tried over-the-counter medications and home remedies without significant relief of her symptoms.  Denies any known ill contacts.  No fever.    Sinus Problem  This is a new problem. The current episode started 1 to 4 weeks ago. The problem is unchanged. There has been no fever. Her pain is at a severity of 7/10. Associated symptoms include sinus pressure and a sore throat. Pertinent negatives include no neck pain. Treatments tried: allegra D.       Constitution: Negative for fever.   HENT:  Positive for postnasal drip, sinus pain, sinus pressure and sore throat.    Neck: Negative for neck pain.   Cardiovascular:  Negative for chest pain, palpitations and sob on exertion.   Respiratory:  Negative for chest tightness and wheezing.    Gastrointestinal:  Negative for abdominal pain, vomiting and diarrhea.   Skin:  Negative for color change and wound.   Neurological:  Negative for numbness and tingling.      Objective:     Physical Exam   Constitutional: She is oriented to person, place, and time. She appears well-developed. She is cooperative.  Non-toxic appearance. She does not appear ill. No distress.   HENT:   Head: Normocephalic and atraumatic.   Ears:   Right Ear: Hearing, tympanic membrane, external ear and ear canal normal.   Left Ear: Hearing, tympanic membrane, external ear and ear canal normal.   Nose: Congestion present. No " mucosal edema, rhinorrhea or nasal deformity. No epistaxis. Right sinus exhibits no maxillary sinus tenderness and no frontal sinus tenderness. Left sinus exhibits no maxillary sinus tenderness and no frontal sinus tenderness.      Comments: TTP over the bilateral maxillary sinuses.     Mouth/Throat: Uvula is midline and mucous membranes are normal. No trismus in the jaw. Normal dentition. No uvula swelling. Posterior oropharyngeal erythema present. No oropharyngeal exudate or posterior oropharyngeal edema.      Comments: Clear postnasal drainage is noted to the posterior oropharynx with mild erythema.  Tolerating secretions well.  Eyes: Conjunctivae and lids are normal. No scleral icterus.   Neck: Trachea normal and phonation normal. Neck supple. No edema present. No erythema present. No neck rigidity present.   Cardiovascular: Normal rate, regular rhythm, normal heart sounds and normal pulses.   Pulmonary/Chest: Effort normal and breath sounds normal. No respiratory distress. She has no decreased breath sounds. She has no wheezes. She has no rhonchi.   Abdominal: Normal appearance.   Musculoskeletal: Normal range of motion.         General: No deformity. Normal range of motion.   Neurological: She is alert and oriented to person, place, and time. She exhibits normal muscle tone. Coordination normal.   Skin: Skin is warm, dry, intact, not diaphoretic and not pale.   Psychiatric: Her speech is normal and behavior is normal. Judgment and thought content normal.   Nursing note and vitals reviewed.      Assessment:     1. Acute bacterial sinusitis        Plan:       Acute bacterial sinusitis  -     amoxicillin-clavulanate 875-125mg (AUGMENTIN) 875-125 mg per tablet; Take 1 tablet by mouth every 12 (twelve) hours. for 7 days  Dispense: 14 tablet; Refill: 0  -     predniSONE (DELTASONE) 20 MG tablet; Take 2 tablets (40 mg total) by mouth once daily. for 4 days  Dispense: 8 tablet; Refill: 0          Medical Decision  Making:   History:   Old Medical Records: I decided to obtain old medical records.  Old Records Summarized: records from clinic visits.  Urgent Care Management:  A. Problem List:   -Acute: Acute bacterial sinusitis    -Chronic: Depression, allergic rhinitis   B. Differential diagnosis: viral vs bacterial URI, pharyngitis, otitis, COVID 19, influenza, pneumonia  C. Diagnostic Testing Ordered: None  D. Diagnostic Testing Considered: None  E. Independent Historians: None  F. Urgent Care Midlevel Independent Results Interpretation:   G. Radiology:  H. Review of Previous Medical Records:  I. Home Medications Reviewed  J. Social Determinants of Health considered  K. Medical Decision Making and Disposition:  Patient presents to the clinic with complaints of nasal congestion, sinus pressure and pain and sore throat for over a week now.  On exam, she is afebrile and nontoxic in appearance.  She has tenderness with palpation over the maxillary sinuses.  Advised close follow-up with primary care and strict ED precautions.  She verbalized understanding and agreed with this plan.         Patient Instructions   You have been prescribed a steroid today. Take the prescription as directed. Steroids can increase blood sugar. You can also have the following when taking steroids: flushing, jitteriness, weight gain, fluid retention, bone weakening. If you develop any adverse symptoms, stop taking the medication immediately.    Thank you for choosing [facility]!     Our goal in the Urgent Care is to always provide outstanding medical care. You may receive a survey by mail or e-mail in the next week regarding your experience today. We would greatly appreciate you completing and returning the survey. Your feedback provides us with a way to recognize our staff who provide very good care, and it helps us learn how to improve when your experience was below our aspiration of excellence.       We appreciate you trusting us with your  medical care. We hope you feel better soon. We will be happy to take care of you for all of your future medical needs.    You must understand that you've received an Urgent Care treatment only and that you may be released before all your medical problems are known or treated. You, the patient, will arrange for follow up care as instructed.      Follow up with your PCP or specialty clinic as instructed in the next 2-3 days if not improved or as needed. You can call [phone] to schedule an appointment with appropriate provider.      If you condition worsens, we recommend that you receive another evaluation at the emergency room immediately or contact your primary medical clinic's after hours call service to discuss your concerns.      Please return here or go to the Emergency Department for any concerns or worsening condition.

## 2025-08-01 NOTE — PATIENT INSTRUCTIONS
You have been prescribed a steroid today. Take the prescription as directed. Steroids can increase blood sugar. You can also have the following when taking steroids: flushing, jitteriness, weight gain, fluid retention, bone weakening. If you develop any adverse symptoms, stop taking the medication immediately.    Thank you for choosing [facility]!     Our goal in the Urgent Care is to always provide outstanding medical care. You may receive a survey by mail or e-mail in the next week regarding your experience today. We would greatly appreciate you completing and returning the survey. Your feedback provides us with a way to recognize our staff who provide very good care, and it helps us learn how to improve when your experience was below our aspiration of excellence.       We appreciate you trusting us with your medical care. We hope you feel better soon. We will be happy to take care of you for all of your future medical needs.    You must understand that you've received an Urgent Care treatment only and that you may be released before all your medical problems are known or treated. You, the patient, will arrange for follow up care as instructed.      Follow up with your PCP or specialty clinic as instructed in the next 2-3 days if not improved or as needed. You can call [phone] to schedule an appointment with appropriate provider.      If you condition worsens, we recommend that you receive another evaluation at the emergency room immediately or contact your primary medical clinic's after hours call service to discuss your concerns.      Please return here or go to the Emergency Department for any concerns or worsening condition.

## 2025-08-26 ENCOUNTER — TELEPHONE (OUTPATIENT)
Dept: OBSTETRICS AND GYNECOLOGY | Facility: CLINIC | Age: 60
End: 2025-08-26
Payer: COMMERCIAL

## 2025-08-26 ENCOUNTER — PATIENT MESSAGE (OUTPATIENT)
Dept: OBSTETRICS AND GYNECOLOGY | Facility: CLINIC | Age: 60
End: 2025-08-26
Payer: COMMERCIAL

## 2025-09-01 DIAGNOSIS — F39 MOOD DISORDER: ICD-10-CM

## 2025-09-02 RX ORDER — FLUOXETINE 20 MG/1
20 CAPSULE ORAL DAILY
Qty: 30 CAPSULE | Refills: 0 | Status: SHIPPED | OUTPATIENT
Start: 2025-09-02

## (undated) DEVICE — Device

## (undated) DEVICE — SYS SEE SHARP SCP ANTIFG LNG

## (undated) DEVICE — SOL NACL IRR 3000ML

## (undated) DEVICE — SYR 50ML CATH TIP

## (undated) DEVICE — IRRIGATOR ENDOSCOPY DISP.

## (undated) DEVICE — SOL POVIDONE PREP IODINE 4 OZ

## (undated) DEVICE — SOUND DISPOSABLE UTERINE W/NOT

## (undated) DEVICE — ELECTRODE REM PLYHSV RETURN 9

## (undated) DEVICE — DRAPE ARM DAVINCI XI

## (undated) DEVICE — SET TRI-LUMEN FILTERED TUBE

## (undated) DEVICE — PACK FLUENT DISPOSABLE

## (undated) DEVICE — INSERT CUSHIONPRONE VIEW LARGE

## (undated) DEVICE — SEAL LENS SCOPE MYOSURE

## (undated) DEVICE — OBTURATOR BLADELESS 8MM XI CLR

## (undated) DEVICE — SOL POVIDONE SCRUB IODINE 4 OZ

## (undated) DEVICE — SOL NORMAL USPCA 0.9%

## (undated) DEVICE — SUT MCRYL PLUS 4-0 PS2 27IN

## (undated) DEVICE — DEVICE MYOSURE REACH

## (undated) DEVICE — KIT WING PAD POSITIONING

## (undated) DEVICE — TRAY DO THE ROBOT

## (undated) DEVICE — SUT PROLENE 0 CT1 30IN BLUE

## (undated) DEVICE — COVER TIP CURVED SCISSORS XI

## (undated) DEVICE — JELLY SURGILUBE 5GR

## (undated) DEVICE — SOL IRRI STRL WATER 1000ML

## (undated) DEVICE — SOL ELECTROLUBE ANTI-STIC

## (undated) DEVICE — ADHESIVE DERMABOND ADVANCED

## (undated) DEVICE — DEVICE SNAPSECURE FOL CATH

## (undated) DEVICE — UNDERGLOVE BIOGEL PI SZ 6.5 LF

## (undated) DEVICE — SOL IRR SOD CHL .9% POUR

## (undated) DEVICE — GLOVE SENSICARE PI SURG 6.5

## (undated) DEVICE — GLOVE BIOGEL SKINSENSE PI 6.5

## (undated) DEVICE — SEAL CANN UNIVERSAL 5-12MM

## (undated) DEVICE — NDL INSUFFLATION VERRES 120MM

## (undated) DEVICE — SYR 10CC LUER LOCK

## (undated) DEVICE — DRAPE COLUMN DAVINCI XI

## (undated) DEVICE — PORT ACCESS 8MM W/120MM LOW

## (undated) DEVICE — SET BASIN 48X48IN 6000ML RING

## (undated) DEVICE — SUT V-LOC 180 GRN GS-21 12IN

## (undated) DEVICE — MANIPULATOR VCARE PLUS 34MM